# Patient Record
Sex: MALE | Race: WHITE
[De-identification: names, ages, dates, MRNs, and addresses within clinical notes are randomized per-mention and may not be internally consistent; named-entity substitution may affect disease eponyms.]

---

## 2017-05-08 ENCOUNTER — HOSPITAL ENCOUNTER (EMERGENCY)
Dept: HOSPITAL 8 - ED | Age: 32
Discharge: HOME | End: 2017-05-08
Payer: MEDICAID

## 2017-05-08 VITALS — DIASTOLIC BLOOD PRESSURE: 72 MMHG | SYSTOLIC BLOOD PRESSURE: 115 MMHG

## 2017-05-08 VITALS — BODY MASS INDEX: 24.23 KG/M2 | HEIGHT: 71 IN | WEIGHT: 173.06 LBS

## 2017-05-08 DIAGNOSIS — B20: ICD-10-CM
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
## 2017-05-08 DIAGNOSIS — M25.572: ICD-10-CM

## 2017-05-08 DIAGNOSIS — M25.571: Primary | ICD-10-CM
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
## 2017-05-08 PROCEDURE — 99284 EMERGENCY DEPT VISIT MOD MDM: CPT

## 2017-05-10 ENCOUNTER — HOSPITAL ENCOUNTER (EMERGENCY)
Dept: HOSPITAL 8 - ED | Age: 32
Discharge: HOME | End: 2017-05-10
Payer: MEDICAID

## 2017-05-10 VITALS — HEIGHT: 71 IN | WEIGHT: 167.77 LBS | BODY MASS INDEX: 23.49 KG/M2

## 2017-05-10 VITALS — SYSTOLIC BLOOD PRESSURE: 113 MMHG | DIASTOLIC BLOOD PRESSURE: 62 MMHG

## 2017-05-10 DIAGNOSIS — Z88.8: ICD-10-CM

## 2017-05-10 DIAGNOSIS — A08.0: Primary | ICD-10-CM

## 2017-05-10 DIAGNOSIS — Z88.6: ICD-10-CM

## 2017-05-10 LAB
AST SERPL-CCNC: 34 U/L (ref 15–37)
BUN SERPL-MCNC: 10 MG/DL (ref 7–18)

## 2017-05-10 PROCEDURE — 80053 COMPREHEN METABOLIC PANEL: CPT

## 2017-05-10 PROCEDURE — 96361 HYDRATE IV INFUSION ADD-ON: CPT

## 2017-05-10 PROCEDURE — 87040 BLOOD CULTURE FOR BACTERIA: CPT

## 2017-05-10 PROCEDURE — 85025 COMPLETE CBC W/AUTO DIFF WBC: CPT

## 2017-05-10 PROCEDURE — 36415 COLL VENOUS BLD VENIPUNCTURE: CPT

## 2017-05-10 PROCEDURE — 93005 ELECTROCARDIOGRAM TRACING: CPT

## 2017-05-10 PROCEDURE — 96374 THER/PROPH/DIAG INJ IV PUSH: CPT

## 2017-05-10 PROCEDURE — 99285 EMERGENCY DEPT VISIT HI MDM: CPT

## 2017-05-10 PROCEDURE — 71010: CPT

## 2017-05-10 PROCEDURE — 83605 ASSAY OF LACTIC ACID: CPT

## 2017-12-30 ENCOUNTER — HOSPITAL ENCOUNTER (EMERGENCY)
Dept: HOSPITAL 8 - ED | Age: 32
Discharge: HOME | End: 2017-12-30
Payer: MEDICAID

## 2017-12-30 VITALS — DIASTOLIC BLOOD PRESSURE: 72 MMHG | SYSTOLIC BLOOD PRESSURE: 120 MMHG

## 2017-12-30 VITALS — WEIGHT: 170.64 LBS | BODY MASS INDEX: 23.89 KG/M2 | HEIGHT: 71 IN

## 2017-12-30 DIAGNOSIS — J45.909: ICD-10-CM

## 2017-12-30 DIAGNOSIS — J09.X2: Primary | ICD-10-CM

## 2017-12-30 LAB
ALBUMIN SERPL-MCNC: 3.3 G/DL (ref 3.4–5)
ANION GAP SERPL CALC-SCNC: 5 MMOL/L (ref 5–15)
BASOPHILS # BLD AUTO: 0.03 X10^3/UL (ref 0–0.1)
BASOPHILS NFR BLD AUTO: 1 % (ref 0–1)
CALCIUM SERPL-MCNC: 8 MG/DL (ref 8.5–10.1)
CHLORIDE SERPL-SCNC: 102 MMOL/L (ref 98–107)
CREAT SERPL-MCNC: 1.15 MG/DL (ref 0.7–1.3)
EOSINOPHIL # BLD AUTO: 0 X10^3/UL (ref 0–0.4)
EOSINOPHIL NFR BLD AUTO: 0 % (ref 1–7)
ERYTHROCYTE [DISTWIDTH] IN BLOOD BY AUTOMATED COUNT: 13.5 % (ref 9.4–14.8)
FLUBV AG SPEC QL IA: POSITIVE
LYMPHOCYTES # BLD AUTO: 1.54 X10^3/UL (ref 1–3.4)
LYMPHOCYTES NFR BLD AUTO: 26 % (ref 22–44)
MCH RBC QN AUTO: 31.4 PG (ref 27.5–34.5)
MCHC RBC AUTO-ENTMCNC: 34.3 G/DL (ref 33.2–36.2)
MCV RBC AUTO: 91.7 FL (ref 81–97)
MD: NO
MONOCYTES # BLD AUTO: 0.57 X10^3/UL (ref 0.2–0.8)
MONOCYTES NFR BLD AUTO: 10 % (ref 2–9)
NEUTROPHILS # BLD AUTO: 3.77 X10^3/UL (ref 1.8–6.8)
NEUTROPHILS NFR BLD AUTO: 64 % (ref 42–75)
PLATELET # BLD AUTO: 300 X10^3/UL (ref 130–400)
PMV BLD AUTO: 6.7 FL (ref 7.4–10.4)
RBC # BLD AUTO: 4.53 X10^6/UL (ref 4.38–5.82)

## 2017-12-30 PROCEDURE — 85025 COMPLETE CBC W/AUTO DIFF WBC: CPT

## 2017-12-30 PROCEDURE — 36415 COLL VENOUS BLD VENIPUNCTURE: CPT

## 2017-12-30 PROCEDURE — 99285 EMERGENCY DEPT VISIT HI MDM: CPT

## 2017-12-30 PROCEDURE — 80048 BASIC METABOLIC PNL TOTAL CA: CPT

## 2017-12-30 PROCEDURE — 87400 INFLUENZA A/B EACH AG IA: CPT

## 2017-12-30 PROCEDURE — 96374 THER/PROPH/DIAG INJ IV PUSH: CPT

## 2017-12-30 PROCEDURE — 71010: CPT

## 2017-12-30 PROCEDURE — 82040 ASSAY OF SERUM ALBUMIN: CPT

## 2017-12-30 PROCEDURE — 93005 ELECTROCARDIOGRAM TRACING: CPT

## 2018-01-05 ENCOUNTER — HOSPITAL ENCOUNTER (EMERGENCY)
Dept: HOSPITAL 8 - ED | Age: 33
Discharge: HOME | End: 2018-01-05
Payer: MEDICAID

## 2018-01-05 VITALS — DIASTOLIC BLOOD PRESSURE: 91 MMHG | SYSTOLIC BLOOD PRESSURE: 133 MMHG

## 2018-01-05 VITALS — HEIGHT: 71 IN | BODY MASS INDEX: 23.46 KG/M2 | WEIGHT: 167.55 LBS

## 2018-01-05 DIAGNOSIS — J45.909: ICD-10-CM

## 2018-01-05 DIAGNOSIS — F17.200: ICD-10-CM

## 2018-01-05 DIAGNOSIS — B96.89: ICD-10-CM

## 2018-01-05 DIAGNOSIS — J20.8: Primary | ICD-10-CM

## 2018-01-05 PROCEDURE — 99283 EMERGENCY DEPT VISIT LOW MDM: CPT

## 2018-06-02 ENCOUNTER — HOSPITAL ENCOUNTER (EMERGENCY)
Dept: HOSPITAL 8 - ED | Age: 33
Discharge: HOME | End: 2018-06-02
Payer: MEDICAID

## 2018-06-02 VITALS — SYSTOLIC BLOOD PRESSURE: 133 MMHG | DIASTOLIC BLOOD PRESSURE: 91 MMHG

## 2018-06-02 VITALS — BODY MASS INDEX: 23.98 KG/M2 | HEIGHT: 71 IN | WEIGHT: 171.3 LBS

## 2018-06-02 DIAGNOSIS — F17.210: ICD-10-CM

## 2018-06-02 DIAGNOSIS — K02.9: Primary | ICD-10-CM

## 2018-06-02 DIAGNOSIS — F15.10: ICD-10-CM

## 2018-06-02 PROCEDURE — 99283 EMERGENCY DEPT VISIT LOW MDM: CPT

## 2018-07-06 ENCOUNTER — HOSPITAL ENCOUNTER (OUTPATIENT)
Facility: MEDICAL CENTER | Age: 33
End: 2018-07-08
Attending: EMERGENCY MEDICINE | Admitting: HOSPITALIST
Payer: MEDICAID

## 2018-07-06 DIAGNOSIS — K35.890 OTHER ACUTE APPENDICITIS: ICD-10-CM

## 2018-07-06 DIAGNOSIS — K35.30 ACUTE APPENDICITIS WITH LOCALIZED PERITONITIS: ICD-10-CM

## 2018-07-06 PROCEDURE — 99291 CRITICAL CARE FIRST HOUR: CPT

## 2018-07-06 PROCEDURE — 90471 IMMUNIZATION ADMIN: CPT

## 2018-07-06 ASSESSMENT — PATIENT HEALTH QUESTIONNAIRE - PHQ9: CLINICAL INTERPRETATION OF PHQ2 SCORE: 0

## 2018-07-06 ASSESSMENT — PAIN SCALES - WONG BAKER: WONGBAKER_NUMERICALRESPONSE: HURTS A WHOLE LOT

## 2018-07-06 ASSESSMENT — PAIN SCALES - GENERAL: PAINLEVEL_OUTOF10: 8

## 2018-07-07 ENCOUNTER — HOSPITAL ENCOUNTER (OUTPATIENT)
Dept: RADIOLOGY | Facility: MEDICAL CENTER | Age: 33
End: 2018-07-07

## 2018-07-07 VITALS
WEIGHT: 160 LBS | OXYGEN SATURATION: 96 % | DIASTOLIC BLOOD PRESSURE: 58 MMHG | HEART RATE: 80 BPM | RESPIRATION RATE: 16 BRPM | HEIGHT: 71 IN | SYSTOLIC BLOOD PRESSURE: 103 MMHG | TEMPERATURE: 97.5 F | BODY MASS INDEX: 22.4 KG/M2

## 2018-07-07 PROBLEM — A41.9 SEPSIS (HCC): Status: ACTIVE | Noted: 2018-07-07

## 2018-07-07 PROBLEM — E87.6 HYPOKALEMIA: Status: ACTIVE | Noted: 2018-07-07

## 2018-07-07 PROBLEM — Z21 HIV (HUMAN IMMUNODEFICIENCY VIRUS INFECTION) (HCC): Status: ACTIVE | Noted: 2018-07-07

## 2018-07-07 PROBLEM — K35.80 APPENDICITIS, ACUTE: Status: ACTIVE | Noted: 2018-07-07

## 2018-07-07 PROBLEM — B20 HIV (HUMAN IMMUNODEFICIENCY VIRUS INFECTION) (HCC): Status: ACTIVE | Noted: 2018-07-07

## 2018-07-07 LAB
ANION GAP SERPL CALC-SCNC: 8 MMOL/L (ref 0–11.9)
APPEARANCE UR: CLEAR
APTT PPP: 30 SEC (ref 24.7–36)
BASOPHILS # BLD AUTO: 0.3 % (ref 0–1.8)
BASOPHILS # BLD: 0.03 K/UL (ref 0–0.12)
BILIRUB UR QL STRIP.AUTO: NEGATIVE
BUN SERPL-MCNC: 8 MG/DL (ref 8–22)
CALCIUM SERPL-MCNC: 8.7 MG/DL (ref 8.5–10.5)
CHLORIDE SERPL-SCNC: 103 MMOL/L (ref 96–112)
CO2 SERPL-SCNC: 23 MMOL/L (ref 20–33)
COLOR UR: YELLOW
CREAT SERPL-MCNC: 0.84 MG/DL (ref 0.5–1.4)
EOSINOPHIL # BLD AUTO: 0.06 K/UL (ref 0–0.51)
EOSINOPHIL NFR BLD: 0.6 % (ref 0–6.9)
ERYTHROCYTE [DISTWIDTH] IN BLOOD BY AUTOMATED COUNT: 42.5 FL (ref 35.9–50)
EST. AVERAGE GLUCOSE BLD GHB EST-MCNC: 103 MG/DL
GLUCOSE SERPL-MCNC: 101 MG/DL (ref 65–99)
GLUCOSE UR STRIP.AUTO-MCNC: NEGATIVE MG/DL
HBA1C MFR BLD: 5.2 % (ref 0–5.6)
HCT VFR BLD AUTO: 39.8 % (ref 42–52)
HGB BLD-MCNC: 14.3 G/DL (ref 14–18)
IMM GRANULOCYTES # BLD AUTO: 0.06 K/UL (ref 0–0.11)
IMM GRANULOCYTES NFR BLD AUTO: 0.6 % (ref 0–0.9)
INR PPP: 1.03 (ref 0.87–1.13)
KETONES UR STRIP.AUTO-MCNC: NEGATIVE MG/DL
LACTATE BLD-SCNC: 1.1 MMOL/L (ref 0.5–2)
LACTATE BLD-SCNC: 1.5 MMOL/L (ref 0.5–2)
LACTATE BLD-SCNC: 1.6 MMOL/L (ref 0.5–2)
LACTATE BLD-SCNC: 2.7 MMOL/L (ref 0.5–2)
LEUKOCYTE ESTERASE UR QL STRIP.AUTO: NEGATIVE
LYMPHOCYTES # BLD AUTO: 1.66 K/UL (ref 1–4.8)
LYMPHOCYTES NFR BLD: 15.8 % (ref 22–41)
MCH RBC QN AUTO: 31.9 PG (ref 27–33)
MCHC RBC AUTO-ENTMCNC: 35.9 G/DL (ref 33.7–35.3)
MCV RBC AUTO: 88.8 FL (ref 81.4–97.8)
MICRO URNS: NORMAL
MONOCYTES # BLD AUTO: 0.71 K/UL (ref 0–0.85)
MONOCYTES NFR BLD AUTO: 6.7 % (ref 0–13.4)
NEUTROPHILS # BLD AUTO: 8.01 K/UL (ref 1.82–7.42)
NEUTROPHILS NFR BLD: 76 % (ref 44–72)
NITRITE UR QL STRIP.AUTO: NEGATIVE
NRBC # BLD AUTO: 0 K/UL
NRBC BLD-RTO: 0 /100 WBC
PH UR STRIP.AUTO: 7.5 [PH]
PLATELET # BLD AUTO: 256 K/UL (ref 164–446)
PMV BLD AUTO: 8.4 FL (ref 9–12.9)
POTASSIUM SERPL-SCNC: 3.8 MMOL/L (ref 3.6–5.5)
PROT UR QL STRIP: NEGATIVE MG/DL
PROTHROMBIN TIME: 13.2 SEC (ref 12–14.6)
RBC # BLD AUTO: 4.48 M/UL (ref 4.7–6.1)
RBC UR QL AUTO: NEGATIVE
SODIUM SERPL-SCNC: 134 MMOL/L (ref 135–145)
SP GR UR STRIP.AUTO: 1.04
TSH SERPL DL<=0.005 MIU/L-ACNC: 0.52 UIU/ML (ref 0.38–5.33)
UROBILINOGEN UR STRIP.AUTO-MCNC: 0.2 MG/DL
WBC # BLD AUTO: 10.5 K/UL (ref 4.8–10.8)

## 2018-07-07 PROCEDURE — 700111 HCHG RX REV CODE 636 W/ 250 OVERRIDE (IP): Performed by: HOSPITALIST

## 2018-07-07 PROCEDURE — 501838 HCHG SUTURE GENERAL: Performed by: SURGERY

## 2018-07-07 PROCEDURE — G0378 HOSPITAL OBSERVATION PER HR: HCPCS

## 2018-07-07 PROCEDURE — 700111 HCHG RX REV CODE 636 W/ 250 OVERRIDE (IP): Performed by: EMERGENCY MEDICINE

## 2018-07-07 PROCEDURE — 83036 HEMOGLOBIN GLYCOSYLATED A1C: CPT

## 2018-07-07 PROCEDURE — 160028 HCHG SURGERY MINUTES - 1ST 30 MINS LEVEL 3: Performed by: SURGERY

## 2018-07-07 PROCEDURE — 160048 HCHG OR STATISTICAL LEVEL 1-5: Performed by: SURGERY

## 2018-07-07 PROCEDURE — 94760 N-INVAS EAR/PLS OXIMETRY 1: CPT

## 2018-07-07 PROCEDURE — 83605 ASSAY OF LACTIC ACID: CPT

## 2018-07-07 PROCEDURE — 501399 HCHG SPECIMAN BAG, ENDO CATC: Performed by: SURGERY

## 2018-07-07 PROCEDURE — 700105 HCHG RX REV CODE 258: Performed by: EMERGENCY MEDICINE

## 2018-07-07 PROCEDURE — 160009 HCHG ANES TIME/MIN: Performed by: SURGERY

## 2018-07-07 PROCEDURE — 700105 HCHG RX REV CODE 258: Performed by: HOSPITALIST

## 2018-07-07 PROCEDURE — 501583 HCHG TROCAR, THRD CAN&SEAL 5X100: Performed by: SURGERY

## 2018-07-07 PROCEDURE — 84443 ASSAY THYROID STIM HORMONE: CPT

## 2018-07-07 PROCEDURE — 501582 HCHG TROCAR, THRD BLADED: Performed by: SURGERY

## 2018-07-07 PROCEDURE — 500002 HCHG ADHESIVE, DERMABOND: Performed by: SURGERY

## 2018-07-07 PROCEDURE — 160035 HCHG PACU - 1ST 60 MINS PHASE I: Performed by: SURGERY

## 2018-07-07 PROCEDURE — 96374 THER/PROPH/DIAG INJ IV PUSH: CPT

## 2018-07-07 PROCEDURE — A9270 NON-COVERED ITEM OR SERVICE: HCPCS | Performed by: HOSPITALIST

## 2018-07-07 PROCEDURE — 88304 TISSUE EXAM BY PATHOLOGIST: CPT

## 2018-07-07 PROCEDURE — 99220 PR INITIAL OBSERVATION CARE,LEVL III: CPT | Performed by: HOSPITALIST

## 2018-07-07 PROCEDURE — 502571 HCHG PACK, LAP CHOLE: Performed by: SURGERY

## 2018-07-07 PROCEDURE — 160039 HCHG SURGERY MINUTES - EA ADDL 1 MIN LEVEL 3: Performed by: SURGERY

## 2018-07-07 PROCEDURE — 700101 HCHG RX REV CODE 250

## 2018-07-07 PROCEDURE — 700105 HCHG RX REV CODE 258: Performed by: INTERNAL MEDICINE

## 2018-07-07 PROCEDURE — 501450 HCHG STAPLES, ENDO MULTIFIRE: Performed by: SURGERY

## 2018-07-07 PROCEDURE — 85025 COMPLETE CBC W/AUTO DIFF WBC: CPT

## 2018-07-07 PROCEDURE — 85730 THROMBOPLASTIN TIME PARTIAL: CPT

## 2018-07-07 PROCEDURE — 36415 COLL VENOUS BLD VENIPUNCTURE: CPT

## 2018-07-07 PROCEDURE — 87040 BLOOD CULTURE FOR BACTERIA: CPT | Mod: 91

## 2018-07-07 PROCEDURE — 85610 PROTHROMBIN TIME: CPT

## 2018-07-07 PROCEDURE — 96375 TX/PRO/DX INJ NEW DRUG ADDON: CPT

## 2018-07-07 PROCEDURE — 700111 HCHG RX REV CODE 636 W/ 250 OVERRIDE (IP)

## 2018-07-07 PROCEDURE — 90670 PCV13 VACCINE IM: CPT | Performed by: HOSPITALIST

## 2018-07-07 PROCEDURE — 160036 HCHG PACU - EA ADDL 30 MINS PHASE I: Performed by: SURGERY

## 2018-07-07 PROCEDURE — 501570 HCHG TROCAR, SEPARATOR: Performed by: SURGERY

## 2018-07-07 PROCEDURE — 160002 HCHG RECOVERY MINUTES (STAT): Performed by: SURGERY

## 2018-07-07 PROCEDURE — 81003 URINALYSIS AUTO W/O SCOPE: CPT

## 2018-07-07 PROCEDURE — 80048 BASIC METABOLIC PNL TOTAL CA: CPT

## 2018-07-07 PROCEDURE — 700102 HCHG RX REV CODE 250 W/ 637 OVERRIDE(OP): Performed by: HOSPITALIST

## 2018-07-07 RX ORDER — ABACAVIR 300 MG/1
600 TABLET ORAL DAILY
Status: DISCONTINUED | OUTPATIENT
Start: 2018-07-07 | End: 2018-07-08 | Stop reason: HOSPADM

## 2018-07-07 RX ORDER — ONDANSETRON 2 MG/ML
4 INJECTION INTRAMUSCULAR; INTRAVENOUS EVERY 4 HOURS PRN
Status: DISCONTINUED | OUTPATIENT
Start: 2018-07-07 | End: 2018-07-08 | Stop reason: HOSPADM

## 2018-07-07 RX ORDER — ONDANSETRON 2 MG/ML
4 INJECTION INTRAMUSCULAR; INTRAVENOUS ONCE
Status: COMPLETED | OUTPATIENT
Start: 2018-07-07 | End: 2018-07-07

## 2018-07-07 RX ORDER — BISACODYL 10 MG
10 SUPPOSITORY, RECTAL RECTAL
Status: DISCONTINUED | OUTPATIENT
Start: 2018-07-07 | End: 2018-07-08 | Stop reason: HOSPADM

## 2018-07-07 RX ORDER — MAGNESIUM HYDROXIDE 1200 MG/15ML
LIQUID ORAL
Status: COMPLETED | OUTPATIENT
Start: 2018-07-07 | End: 2018-07-07

## 2018-07-07 RX ORDER — BUPIVACAINE HYDROCHLORIDE AND EPINEPHRINE 5; 5 MG/ML; UG/ML
INJECTION, SOLUTION EPIDURAL; INTRACAUDAL; PERINEURAL
Status: DISCONTINUED | OUTPATIENT
Start: 2018-07-07 | End: 2018-07-07 | Stop reason: HOSPADM

## 2018-07-07 RX ORDER — AMOXICILLIN 250 MG
2 CAPSULE ORAL 2 TIMES DAILY
Status: DISCONTINUED | OUTPATIENT
Start: 2018-07-07 | End: 2018-07-08 | Stop reason: HOSPADM

## 2018-07-07 RX ORDER — ONDANSETRON 4 MG/1
4 TABLET, ORALLY DISINTEGRATING ORAL EVERY 4 HOURS PRN
Status: DISCONTINUED | OUTPATIENT
Start: 2018-07-07 | End: 2018-07-08 | Stop reason: HOSPADM

## 2018-07-07 RX ORDER — OXYCODONE HYDROCHLORIDE 5 MG/1
5 TABLET ORAL
Status: DISCONTINUED | OUTPATIENT
Start: 2018-07-07 | End: 2018-07-08 | Stop reason: HOSPADM

## 2018-07-07 RX ORDER — SODIUM CHLORIDE 9 MG/ML
30 INJECTION, SOLUTION INTRAVENOUS
Status: DISCONTINUED | OUTPATIENT
Start: 2018-07-07 | End: 2018-07-08 | Stop reason: HOSPADM

## 2018-07-07 RX ORDER — SODIUM CHLORIDE, SODIUM LACTATE, POTASSIUM CHLORIDE, CALCIUM CHLORIDE 600; 310; 30; 20 MG/100ML; MG/100ML; MG/100ML; MG/100ML
INJECTION, SOLUTION INTRAVENOUS
Status: COMPLETED | OUTPATIENT
Start: 2018-07-07 | End: 2018-07-07

## 2018-07-07 RX ORDER — PROMETHAZINE HYDROCHLORIDE 25 MG/1
12.5-25 SUPPOSITORY RECTAL EVERY 4 HOURS PRN
Status: DISCONTINUED | OUTPATIENT
Start: 2018-07-07 | End: 2018-07-08 | Stop reason: HOSPADM

## 2018-07-07 RX ORDER — SODIUM CHLORIDE 9 MG/ML
INJECTION, SOLUTION INTRAVENOUS CONTINUOUS
Status: DISCONTINUED | OUTPATIENT
Start: 2018-07-07 | End: 2018-07-08 | Stop reason: HOSPADM

## 2018-07-07 RX ORDER — SODIUM CHLORIDE 9 MG/ML
INJECTION, SOLUTION INTRAVENOUS CONTINUOUS
Status: DISCONTINUED | OUTPATIENT
Start: 2018-07-07 | End: 2018-07-07

## 2018-07-07 RX ORDER — SODIUM CHLORIDE 9 MG/ML
500 INJECTION, SOLUTION INTRAVENOUS
Status: DISCONTINUED | OUTPATIENT
Start: 2018-07-07 | End: 2018-07-08 | Stop reason: HOSPADM

## 2018-07-07 RX ORDER — PROMETHAZINE HYDROCHLORIDE 25 MG/1
12.5-25 TABLET ORAL EVERY 4 HOURS PRN
Status: DISCONTINUED | OUTPATIENT
Start: 2018-07-07 | End: 2018-07-08 | Stop reason: HOSPADM

## 2018-07-07 RX ORDER — SODIUM CHLORIDE 9 MG/ML
1000 INJECTION, SOLUTION INTRAVENOUS ONCE
Status: COMPLETED | OUTPATIENT
Start: 2018-07-07 | End: 2018-07-07

## 2018-07-07 RX ORDER — OXYCODONE HYDROCHLORIDE 10 MG/1
10 TABLET ORAL
Status: DISCONTINUED | OUTPATIENT
Start: 2018-07-07 | End: 2018-07-08 | Stop reason: HOSPADM

## 2018-07-07 RX ORDER — LAMIVUDINE 150 MG/1
300 TABLET, FILM COATED ORAL DAILY
Status: DISCONTINUED | OUTPATIENT
Start: 2018-07-07 | End: 2018-07-08 | Stop reason: HOSPADM

## 2018-07-07 RX ORDER — POLYETHYLENE GLYCOL 3350 17 G/17G
1 POWDER, FOR SOLUTION ORAL
Status: DISCONTINUED | OUTPATIENT
Start: 2018-07-07 | End: 2018-07-08 | Stop reason: HOSPADM

## 2018-07-07 RX ADMIN — SODIUM CHLORIDE 1000 ML: 9 INJECTION, SOLUTION INTRAVENOUS at 12:42

## 2018-07-07 RX ADMIN — LAMIVUDINE 300 MG: 150 TABLET, FILM COATED ORAL at 08:36

## 2018-07-07 RX ADMIN — FENTANYL CITRATE 100 MCG: 50 INJECTION, SOLUTION INTRAMUSCULAR; INTRAVENOUS at 00:22

## 2018-07-07 RX ADMIN — OXYCODONE HYDROCHLORIDE 5 MG: 5 TABLET ORAL at 11:07

## 2018-07-07 RX ADMIN — SODIUM CHLORIDE: 9 INJECTION, SOLUTION INTRAVENOUS at 00:22

## 2018-07-07 RX ADMIN — STANDARDIZED SENNA CONCENTRATE AND DOCUSATE SODIUM 2 TABLET: 8.6; 5 TABLET, FILM COATED ORAL at 08:39

## 2018-07-07 RX ADMIN — OXYCODONE HYDROCHLORIDE 5 MG: 5 TABLET ORAL at 20:00

## 2018-07-07 RX ADMIN — PIPERACILLIN AND TAZOBACTAM 4.5 G: 4; .5 INJECTION, POWDER, LYOPHILIZED, FOR SOLUTION INTRAVENOUS; PARENTERAL at 03:55

## 2018-07-07 RX ADMIN — ABACAVIR 600 MG: 300 TABLET, FILM COATED ORAL at 08:36

## 2018-07-07 RX ADMIN — SODIUM CHLORIDE: 9 INJECTION, SOLUTION INTRAVENOUS at 04:40

## 2018-07-07 RX ADMIN — DOLUTEGRAVIR SODIUM 50 MG: 50 TABLET, FILM COATED ORAL at 08:36

## 2018-07-07 RX ADMIN — ONDANSETRON 4 MG: 2 INJECTION, SOLUTION INTRAMUSCULAR; INTRAVENOUS at 00:22

## 2018-07-07 RX ADMIN — PNEUMOCOCCAL 13-VALENT CONJUGATE VACCINE 0.5 ML: 2.2; 2.2; 2.2; 2.2; 2.2; 4.4; 2.2; 2.2; 2.2; 2.2; 2.2; 2.2; 2.2 INJECTION, SUSPENSION INTRAMUSCULAR at 08:44

## 2018-07-07 ASSESSMENT — ENCOUNTER SYMPTOMS
HEMOPTYSIS: 0
BLURRED VISION: 0
SENSORY CHANGE: 0
COUGH: 0
ABDOMINAL PAIN: 1
DOUBLE VISION: 0
PALPITATIONS: 0
WEAKNESS: 0
NAUSEA: 1
CHILLS: 0
HEARTBURN: 0
FLANK PAIN: 0
FEVER: 0
VOMITING: 0
MYALGIAS: 0
EYE DISCHARGE: 0
BRUISES/BLEEDS EASILY: 0
SPEECH CHANGE: 0
SHORTNESS OF BREATH: 0
DIZZINESS: 0
FOCAL WEAKNESS: 0
HALLUCINATIONS: 0
DEPRESSION: 0

## 2018-07-07 ASSESSMENT — PATIENT HEALTH QUESTIONNAIRE - PHQ9
9. THOUGHTS THAT YOU WOULD BE BETTER OFF DEAD, OR OF HURTING YOURSELF: NOT AT ALL
5. POOR APPETITE OR OVEREATING: MORE THAN HALF THE DAYS
SUM OF ALL RESPONSES TO PHQ9 QUESTIONS 1 AND 2: 4
4. FEELING TIRED OR HAVING LITTLE ENERGY: MORE THAN HALF THE DAYS
2. FEELING DOWN, DEPRESSED, IRRITABLE, OR HOPELESS: MORE THAN HALF THE DAYS
3. TROUBLE FALLING OR STAYING ASLEEP OR SLEEPING TOO MUCH: SEVERAL DAYS
8. MOVING OR SPEAKING SO SLOWLY THAT OTHER PEOPLE COULD HAVE NOTICED. OR THE OPPOSITE, BEING SO FIGETY OR RESTLESS THAT YOU HAVE BEEN MOVING AROUND A LOT MORE THAN USUAL: NOT AT ALL
6. FEELING BAD ABOUT YOURSELF - OR THAT YOU ARE A FAILURE OR HAVE LET YOURSELF OR YOUR FAMILY DOWN: MORE THAN HALF THE DAYS
SUM OF ALL RESPONSES TO PHQ QUESTIONS 1-9: 12
1. LITTLE INTEREST OR PLEASURE IN DOING THINGS: MORE THAN HALF THE DAYS
7. TROUBLE CONCENTRATING ON THINGS, SUCH AS READING THE NEWSPAPER OR WATCHING TELEVISION: SEVERAL DAYS

## 2018-07-07 ASSESSMENT — PAIN SCALES - GENERAL
PAINLEVEL_OUTOF10: 0
PAINLEVEL_OUTOF10: 3
PAINLEVEL_OUTOF10: 5
PAINLEVEL_OUTOF10: 0
PAINLEVEL_OUTOF10: 4
PAINLEVEL_OUTOF10: 4
PAINLEVEL_OUTOF10: 3
PAINLEVEL_OUTOF10: 4
PAINLEVEL_OUTOF10: 3
PAINLEVEL_OUTOF10: 4
PAINLEVEL_OUTOF10: 0

## 2018-07-07 ASSESSMENT — COGNITIVE AND FUNCTIONAL STATUS - GENERAL
DAILY ACTIVITIY SCORE: 24
SUGGESTED CMS G CODE MODIFIER DAILY ACTIVITY: CH
MOBILITY SCORE: 24
SUGGESTED CMS G CODE MODIFIER MOBILITY: CH

## 2018-07-07 ASSESSMENT — LIFESTYLE VARIABLES
SUBSTANCE_ABUSE: 0
EVER_SMOKED: YES
ALCOHOL_USE: NO

## 2018-07-07 NOTE — OR NURSING
Pt dozing but awakens and appropriate.  He still denies any pain and no objective signs such as facial grimacing or tense body habitus.  Pt prepped for floor.

## 2018-07-07 NOTE — ED TRIAGE NOTES
Patient transferred form [facility] for appendicitis. Patient reports RUQ that started yesterday and has become worse today. Hx of HIV

## 2018-07-07 NOTE — OR NURSING
Pt remains sleepy but able to answer questions.  Pt does say he is at a hospital but says it is [facility]'s.  I corrected him to which hospital he is in.

## 2018-07-07 NOTE — ASSESSMENT & PLAN NOTE
This is sepsis (without associated acute organ dysfunction).   intrabd infection with appendicitis  IV abx   Follow cultures  Check lactic trend   IVF   descilate as clinically appropriate

## 2018-07-07 NOTE — ASSESSMENT & PLAN NOTE
Acute appendicitis  NPO  Surgery has been consulted  IV abx for prophylaxis  Pain control, anti emetics

## 2018-07-07 NOTE — H&P
Hospital Medicine History and Physical    Date of Service  7/7/2018    Chief Complaint  Chief Complaint   Patient presents with   • Abdominal Pain       History of Presenting Illness  33 y.o. male who presented 7/6/2018 with gradually worsening right lower quadrant abdominal pain.  Patient's pain initially was a crampy pain mid abdomen now progressively worsening the right lower quadrant.  Severe pain now 10 out of 10 especially with palpation.  He has also fevers nausea no vomiting no diarrhea no constipation.  He has a history of HIV otherwise is generally healthy.  He has no alleviating or exacerbating factors to his pain except for palpation.  Found to have appendicitis and transferred to [facility] for surgical intervention.    Primary Care Physician  Pcp Pt States None    Consultants  Surgery     Code Status  full    Review of Systems  Review of Systems   Constitutional: Negative for chills and fever.   HENT: Negative for congestion, hearing loss and tinnitus.    Eyes: Negative for blurred vision, double vision and discharge.   Respiratory: Negative for cough, hemoptysis and shortness of breath.    Cardiovascular: Negative for chest pain, palpitations and leg swelling.   Gastrointestinal: Positive for abdominal pain and nausea. Negative for heartburn and vomiting.   Genitourinary: Negative for dysuria and flank pain.   Musculoskeletal: Negative for joint pain and myalgias.   Skin: Negative for rash.   Neurological: Negative for dizziness, sensory change, speech change, focal weakness and weakness.   Endo/Heme/Allergies: Negative for environmental allergies. Does not bruise/bleed easily.   Psychiatric/Behavioral: Negative for depression, hallucinations and substance abuse.        Past Medical History  Past Medical History:   Diagnosis Date   • Asthma    • HIV (human immunodeficiency virus infection) (HCC)        Surgical History  Past Surgical History:   Procedure Laterality Date   • CLUB FOOT RELEASE Bilateral   "      Medications  No current facility-administered medications on file prior to encounter.      No current outpatient prescriptions on file prior to encounter.       Family History  History reviewed. No pertinent family history.    Social History  Social History   Substance Use Topics   • Smoking status: Current Every Day Smoker     Packs/day: 0.50     Years: 4.00     Types: Cigarettes   • Smokeless tobacco: Current User   • Alcohol use No       Allergies  Allergies   Allergen Reactions   • Morphine      Pt turned \"red\" and was put into an \"ice bath\" after receiving morphine at age 3    • Benadryl Allergy      Pt gets \"amped up, jittery and cannot sleep\"        Physical Exam  Laboratory   Hemodynamics  Temp (24hrs), Av.8 °C (100 °F), Min:37.8 °C (100 °F), Max:37.8 °C (100 °F)   Temperature: 37.8 °C (100 °F)  Pulse  Av  Min: 102  Max: 102    Blood Pressure: 129/82      Respiratory      Respiration: 18, Pulse Oximetry: 95 %             Physical Exam   Constitutional: He is oriented to person, place, and time. He appears well-developed and well-nourished.   HENT:   Head: Normocephalic and atraumatic.   Eyes: Conjunctivae and EOM are normal. Pupils are equal, round, and reactive to light.   Neck: Normal range of motion. Neck supple. No JVD present.   Cardiovascular: Normal rate, regular rhythm, normal heart sounds and intact distal pulses.    No murmur heard.  Pulmonary/Chest: Effort normal and breath sounds normal. No respiratory distress. He exhibits no tenderness.   Abdominal: Soft. Bowel sounds are normal. He exhibits no distension. There is tenderness.   Severe ttp RLQ    Musculoskeletal: Normal range of motion. He exhibits no edema.   Neurological: He is alert and oriented to person, place, and time. No cranial nerve deficit. He exhibits normal muscle tone.   Skin: Skin is warm and dry. No erythema.   Psychiatric: He has a normal mood and affect. His behavior is normal. Judgment and thought content " normal.   Nursing note and vitals reviewed.        CBC 10.9 WBC count hemoglobin 15.3 platelet 268  Sodium 132 potassium 3.1 chloride 94 CO2 28 calcium 8.3 BUN 10 creatinine 1.04 glucose 105 albumin 3.8      No results for input(s): ALTSGPT, ASTSGOT, ALKPHOSPHAT, TBILIRUBIN, DBILIRUBIN, GAMMAGT, AMYLASE, LIPASE, ALB, PREALBUMIN, GLUCOSE in the last 72 hours.              No results found for: TROPONINI  Urinalysis:  No results found for: SPECGRAVITY, GLUCOSEUR, KETONES, NITRITE, WBCURINE, RBCURINE, BACTERIA, EPITHELCELL     Imaging  Severe acute appendicitis no definite evidence of rupture on CT   Assessment/Plan     I anticipate this patient is appropriate for observation status at this time.    * Appendicitis, acute   Assessment & Plan    Acute appendicitis  NPO  Surgery has been consulted  IV abx for prophylaxis  Pain control, anti emetics        Hypokalemia   Assessment & Plan    Replace and recheck        HIV (human immunodeficiency virus infection) ([facility])   Assessment & Plan    Started on anti HIV medications   Triumeq        Sepsis ([facility])   Assessment & Plan    This is sepsis (without associated acute organ dysfunction).   intrabd infection with appendicitis  IV abx   Follow cultures  Check lactic trend   IVF   descilate as clinically appropriate            VTE prophylaxis: SCD

## 2018-07-07 NOTE — PROGRESS NOTES
POD#0  Feels better  Wounds ok  Ok to d/c when cleared by primary team  Follow up with pcp or me in 1 week.

## 2018-07-07 NOTE — PROGRESS NOTES
Pt arrived at 430. Pt mad comfortable in room. Pt taught about floor procedure. Pt orientated to where things were.All questions answered.

## 2018-07-07 NOTE — OR SURGEON
Post OP Note    PreOp Diagnosis: Acute appendicitis     PostOp Diagnosis: Acute appendicitis without rupture    Procedure(s):  APPENDECTOMY LAPAROSCOPIC - Wound Class: Clean Contaminated    Surgeon(s):  Avinash Hills M.D.    Anesthesiologist/Type of Anesthesia:  Anesthesiologist: Guillermina Cleveland M.D./General    Surgical Staff:  Circulator: Kathie Mena R.N.  Relief Circulator: Fani Bhatti R.N.  Scrub Person: Latriceros Sanchez [place]: Orly Landeros    Specimens removed if any:  Appendix    Estimated Blood Loss: 15 cc    Findings: Acute appendicitis without rupture    Complications: No complications were noted    Indication: Patient is a 33-year-old HIV-positive male who presented as a transfer from [facility] with acute appendicitis.  The patient was counseled extensively as to the risks versus benefits of surgery and agreed to proceed fully informed.    Description of the procedure:  The patient was prepped and draped in the standard sterile surgical fashion after induction of general anesthesia.  Appropriate timeout had been performed and antibiotics delivered.  A periumbilical verres insertion was performed and the abdomen was insufflated to 15 mmHg CO2.  A 5 mm Visiport was applied.  A suprapubic 5 mm trocar and a subxiphoid 12 mm trocar placed under direct visualization.    The appendix was located in its right lower quadrant position.  It was dilated and inflamed but not perforated.  A Harmonic scalpel was used to carefully take the mesoappendix down to the base of the appendix.  A 35 mm vascular load stapler was used to transect the appendix at its base.  An Endo Catch was used to retrieve the appendix via the subxiphoid incision.    Right lower quadrant was irrigated until clear.  The staple line was intact.  Hemostasis was meticulously achieved.  An Endo Close device was used to close the fascia of the subxiphoid trocar.  Monocryl was used for skin edges.  Dermabond was applied as  a dressing.  The patient was extubated to recovery in satisfactory condition.    Disposition:    The patient will be admitted to the medicine service.  He is doing well he can be discharged later this afternoon.    7/7/2018 2:49 AM Avinash Hills M.D.

## 2018-07-07 NOTE — H&P
"General Surgery Consult    CHIEF COMPLAINT: Abdominal pain    HISTORY OF PRESENT ILLNESS: The patient is a 33 y.o. male, who presents with abdominal pain.  It began this morning at 8 AM.  He presented to the emergency department in [facility] where he was diagnosed with acute appendicitis.  He was transferred to [facility] for unknown reasons.  The patient describes right lower quadrant abdominal pain that is worse with movement and without relieving factors.  His past medical history is complicated by being HIV positive.  He reports his last viral count is undetectable.    PAST MEDICAL HISTORY:  has a past medical history of Asthma and HIV (human immunodeficiency virus infection) ([facility]).     PAST SURGICAL HISTORY:  has a past surgical history that includes club foot release (Bilateral).     ALLERGIES:   Allergies   Allergen Reactions   • Morphine      Pt turned \"red\" and was put into an \"ice bath\" after receiving morphine at age 3    • Benadryl Allergy      Pt gets \"amped up, jittery and cannot sleep\"        CURRENT MEDICATIONS:   Home Medications     Reviewed by Orlin Myers R.N. (Registered Nurse) on 07/06/18 at 2345  Med List Status: Not Addressed   Medication Last Dose Status   Abacavir-Dolutegravir-Lamivud (TRIUMEQ PO)  Active                FAMILY HISTORY: History reviewed. No pertinent family history.     SOCIAL HISTORY:   Social History     Social History Main Topics   • Smoking status: Current Every Day Smoker     Packs/day: 0.50     Years: 4.00     Types: Cigarettes   • Smokeless tobacco: Current User   • Alcohol use No   • Drug use: Yes     Types: Inhaled      Comment: marijuana, methamphetamine (last use 1 week ago)    • Sexual activity: Not on file       REVIEW OF SYSTEMS: Comprehensive review of systems was negative aside from the above HPI.     PHYSICAL EXAMINATION:     GENERAL: The patient is in mild distress.   VITAL SIGNS: Blood pressure 130/80, pulse 99, temperature 36.4 °C " "(97.5 °F), resp. rate 18, height 1.803 m (5' 11\"), weight 72.6 kg (160 lb), SpO2 97 %.  HEAD AND NECK: Demonstrates symmetric, reactive pupils. Extraocular muscles   are intact. Nares and oropharynx are clear.   NECK: Supple. No adenopathy.  CHEST:No respiratory distress.    CARDIOVASCULAR: Regular rate. The extremities are well perfused.   ABDOMEN: Tender to palpation in the right lower quadrant with guarding.   EXTREMITIES: Examination of the upper and lower extremities demonstrates no cyanosis edema or clubbing.  NEUROLOGIC: Alert & oriented x 3, Normal motor function, Normal sensory function, No focal deficits noted.    LABORATORY VALUES:   Recent Labs      07/07/18   0018   WBC  10.5   RBC  4.48*   HEMOGLOBIN  14.3   HEMATOCRIT  39.8*   MCV  88.8   MCH  31.9   MCHC  35.9*   RDW  42.5   PLATELETCT  256   MPV  8.4*     Recent Labs      07/07/18   0018   SODIUM  134*   POTASSIUM  3.8   CHLORIDE  103   CO2  23   GLUCOSE  101*   BUN  8   CREATININE  0.84   CALCIUM  8.7     Recent Labs      07/07/18   0040   INR  1.03     Recent Labs      07/07/18   0040   APTT  30.0   INR  1.03        IMAGING:   OUTSIDE IMAGES-CT ABDOMEN /PELVIS   Final Result          IMPRESSION AND PLAN:     1.  Acute appendicitis.    1.  The patient will be taken to the operating room for a laparoscopic appendectomy. The surgical conduct was explained. Potential complications including but not limited to infection, bleeding, damage to adjacent structures, anesthetic complications were discussed in detail. Questions were elicited and answered to his satisfaction. He understands the rationale for surgery and elects to proceed.  Operative consent signed.      2.  The patient will be admitted to the hospital service secondary to his HIV positive status    ___________________________________   Avinash Hills M.D.    DD: 7/7/2018 DT: 1:54 AM    "

## 2018-07-07 NOTE — CARE PLAN
Problem: Safety  Goal: Will remain free from falls  Outcome: PROGRESSING AS EXPECTED  No fall has occurred. Pt knows about needing to call prior to getting up.     Problem: Infection  Goal: Will remain free from infection  Outcome: PROGRESSING AS EXPECTED  No s/s of infection. Before and after entering the room hands are washed. Prior to IV access the hub is scrubbed for 15 secs and allowed to dry.

## 2018-07-07 NOTE — PROGRESS NOTES
"Pt up to EOB having meal, calm, cooperative, pleasant affect. Dr Patterson at bedside reviewed POC which is for possible discharge today, when / if pian well managed. RN encouraged CDB  will reinforce t/o day. Fall and safety precautions in place, pt uses call light appropriately. RN reviewed \"ankle pumps\" to promote circulation when in bed, pt is ambulatory. Pt is independent w/ turns for skin integrity. Hourly rounds ongoing, call light w/in reach.   "

## 2018-07-08 PROBLEM — E87.6 HYPOKALEMIA: Status: RESOLVED | Noted: 2018-07-07 | Resolved: 2018-07-08

## 2018-07-08 PROBLEM — A41.9 SEPSIS (HCC): Status: RESOLVED | Noted: 2018-07-07 | Resolved: 2018-07-08

## 2018-07-08 LAB
ALBUMIN SERPL BCP-MCNC: 3.6 G/DL (ref 3.2–4.9)
ALBUMIN/GLOB SERPL: 1.2 G/DL
ALP SERPL-CCNC: 74 U/L (ref 30–99)
ALT SERPL-CCNC: 22 U/L (ref 2–50)
ANION GAP SERPL CALC-SCNC: 6 MMOL/L (ref 0–11.9)
AST SERPL-CCNC: 22 U/L (ref 12–45)
BASOPHILS # BLD AUTO: 0.3 % (ref 0–1.8)
BASOPHILS # BLD: 0.03 K/UL (ref 0–0.12)
BILIRUB SERPL-MCNC: 0.4 MG/DL (ref 0.1–1.5)
BUN SERPL-MCNC: 10 MG/DL (ref 8–22)
CALCIUM SERPL-MCNC: 9 MG/DL (ref 8.5–10.5)
CHLORIDE SERPL-SCNC: 106 MMOL/L (ref 96–112)
CHOLEST SERPL-MCNC: 117 MG/DL (ref 100–199)
CO2 SERPL-SCNC: 25 MMOL/L (ref 20–33)
CREAT SERPL-MCNC: 0.83 MG/DL (ref 0.5–1.4)
EOSINOPHIL # BLD AUTO: 0.05 K/UL (ref 0–0.51)
EOSINOPHIL NFR BLD: 0.4 % (ref 0–6.9)
ERYTHROCYTE [DISTWIDTH] IN BLOOD BY AUTOMATED COUNT: 43.5 FL (ref 35.9–50)
GLOBULIN SER CALC-MCNC: 2.9 G/DL (ref 1.9–3.5)
GLUCOSE SERPL-MCNC: 118 MG/DL (ref 65–99)
HCT VFR BLD AUTO: 39.1 % (ref 42–52)
HDLC SERPL-MCNC: 53 MG/DL
HGB BLD-MCNC: 14 G/DL (ref 14–18)
IMM GRANULOCYTES # BLD AUTO: 0.06 K/UL (ref 0–0.11)
IMM GRANULOCYTES NFR BLD AUTO: 0.5 % (ref 0–0.9)
LDLC SERPL CALC-MCNC: 49 MG/DL
LYMPHOCYTES # BLD AUTO: 2.17 K/UL (ref 1–4.8)
LYMPHOCYTES NFR BLD: 18.1 % (ref 22–41)
MCH RBC QN AUTO: 32.2 PG (ref 27–33)
MCHC RBC AUTO-ENTMCNC: 35.8 G/DL (ref 33.7–35.3)
MCV RBC AUTO: 89.9 FL (ref 81.4–97.8)
MONOCYTES # BLD AUTO: 0.81 K/UL (ref 0–0.85)
MONOCYTES NFR BLD AUTO: 6.8 % (ref 0–13.4)
NEUTROPHILS # BLD AUTO: 8.87 K/UL (ref 1.82–7.42)
NEUTROPHILS NFR BLD: 73.9 % (ref 44–72)
NRBC # BLD AUTO: 0 K/UL
NRBC BLD-RTO: 0 /100 WBC
PLATELET # BLD AUTO: 292 K/UL (ref 164–446)
PMV BLD AUTO: 8.7 FL (ref 9–12.9)
POTASSIUM SERPL-SCNC: 4 MMOL/L (ref 3.6–5.5)
PROT SERPL-MCNC: 6.5 G/DL (ref 6–8.2)
RBC # BLD AUTO: 4.35 M/UL (ref 4.7–6.1)
SODIUM SERPL-SCNC: 137 MMOL/L (ref 135–145)
TRIGL SERPL-MCNC: 76 MG/DL (ref 0–149)
WBC # BLD AUTO: 12 K/UL (ref 4.8–10.8)

## 2018-07-08 PROCEDURE — 36415 COLL VENOUS BLD VENIPUNCTURE: CPT

## 2018-07-08 PROCEDURE — 99217 PR OBSERVATION CARE DISCHARGE: CPT | Performed by: INTERNAL MEDICINE

## 2018-07-08 PROCEDURE — G0378 HOSPITAL OBSERVATION PER HR: HCPCS

## 2018-07-08 PROCEDURE — 80061 LIPID PANEL: CPT

## 2018-07-08 PROCEDURE — 85025 COMPLETE CBC W/AUTO DIFF WBC: CPT

## 2018-07-08 PROCEDURE — 700102 HCHG RX REV CODE 250 W/ 637 OVERRIDE(OP): Performed by: HOSPITALIST

## 2018-07-08 PROCEDURE — A9270 NON-COVERED ITEM OR SERVICE: HCPCS | Performed by: HOSPITALIST

## 2018-07-08 PROCEDURE — 80053 COMPREHEN METABOLIC PANEL: CPT

## 2018-07-08 RX ORDER — OXYCODONE HYDROCHLORIDE 5 MG/1
5 TABLET ORAL EVERY 6 HOURS PRN
Qty: 5 TAB | Refills: 0 | Status: SHIPPED | OUTPATIENT
Start: 2018-07-08 | End: 2018-07-11

## 2018-07-08 RX ORDER — AMOXICILLIN 250 MG
2 CAPSULE ORAL 2 TIMES DAILY
Qty: 28 TAB | Refills: 0 | Status: SHIPPED | OUTPATIENT
Start: 2018-07-08 | End: 2018-07-15

## 2018-07-08 RX ADMIN — ABACAVIR 600 MG: 300 TABLET, FILM COATED ORAL at 08:26

## 2018-07-08 RX ADMIN — DOLUTEGRAVIR SODIUM 50 MG: 50 TABLET, FILM COATED ORAL at 08:26

## 2018-07-08 RX ADMIN — STANDARDIZED SENNA CONCENTRATE AND DOCUSATE SODIUM 2 TABLET: 8.6; 5 TABLET, FILM COATED ORAL at 08:26

## 2018-07-08 RX ADMIN — OXYCODONE HYDROCHLORIDE 5 MG: 5 TABLET ORAL at 03:50

## 2018-07-08 RX ADMIN — LAMIVUDINE 300 MG: 150 TABLET, FILM COATED ORAL at 08:26

## 2018-07-08 RX ADMIN — OXYCODONE HYDROCHLORIDE 10 MG: 10 TABLET ORAL at 09:38

## 2018-07-08 ASSESSMENT — PAIN SCALES - GENERAL
PAINLEVEL_OUTOF10: 4
PAINLEVEL_OUTOF10: 2
PAINLEVEL_OUTOF10: 4

## 2018-07-08 NOTE — DISCHARGE INSTRUCTIONS
Discharge Instructions    Discharged to home by car with friend. Discharged via wheelchair, hospital escort: Yes.  Special equipment needed: Not Applicable    Be sure to schedule a follow-up appointment with your primary care doctor or any specialists as instructed.     Discharge Plan:   Smoking Cessation Offered: Patient Refused  Pneumococcal Vaccine Administered/Refused: Given (See MAR)  Influenza Vaccine Indication: Patient Refuses    I understand that a diet low in cholesterol, fat, and sodium is recommended for good health. Unless I have been given specific instructions below for another diet, I accept this instruction as my diet prescription.   Other diet: regular    Special Instructions: None    · Is patient discharged on Warfarin / Coumadin?   No     Depression / Suicide Risk    As you are discharged from this [facility] facility, it is important to learn how to keep safe from harming yourself.    Recognize the warning signs:  · Abrupt changes in personality, positive or negative- including increase in energy   · Giving away possessions  · Change in eating patterns- significant weight changes-  positive or negative  · Change in sleeping patterns- unable to sleep or sleeping all the time   · Unwillingness or inability to communicate  · Depression  · Unusual sadness, discouragement and loneliness  · Talk of wanting to die  · Neglect of personal appearance   · Rebelliousness- reckless behavior  · Withdrawal from people/activities they love  · Confusion- inability to concentrate     If you or a loved one observes any of these behaviors or has concerns about self-harm, here's what you can do:  · Talk about it- your feelings and reasons for harming yourself  · Remove any means that you might use to hurt yourself (examples: pills, rope, extension cords, firearm)  · Get professional help from the community (Mental Health, Substance Abuse, psychological counseling)  · Do not be alone:Call your Safe Contact-  someone whom you trust who will be there for you.  · Call your local CRISIS HOTLINE 128-7765 or 388-341-1592  · Call your local Children's Mobile Crisis Response Team [place] (368) 228-0511 or www.[employer] [place]  · Call the toll free National Suicide Prevention Hotlines   · National Suicide Prevention Lifeline 024-809-AXSF (7589)  · [facility] Line Network 800-SUICIDE (275-1900)    How can pain medicine affect me?  You were prescribed pain medicine. This medicine may:  · Make you tired or sleepy.  · Make you feel dizzy.  · Affect how well you can:  ¨ Drive  ¨ Do certain activities.  Pain medicine may not make all of your pain go away. You should be comfortable enough to:  · Move.  · Breathe.  · Take care of yourself.  How often should I take pain medicine and how much should I take?  · Take pain medicine only as told by your doctor and only as needed for pain.  · You do not need to take pain medicine if you are not having pain, unless your doctor tells you to do that.  · You can take less than the prescribed dose if you find that less medicine helps your pain.  · If you have very bad (severe) pain, call your doctor. Do not take more pills than told by your doctor. Do not take pills more often than told by your doctor.  What should I avoid while I am taking pain medicine?  Follow these instructions after you start taking pain medicine, while you are taking the medicine, and for 8 hours after you stop taking the medicine:  · Do not drive.  · Do not use machinery.  · Do not use power tools.  · Do not sign legal documents.  · Do not drink alcohol.  · Do not take sleeping pills.  · Do not take care of children by yourself.  · Do not do any activities that involve climbing or being in high places.  · Do not go into any body of water unless there is an adult nearby who can watch and help you. This includes:  ¨ Lakes.  ¨ Rivers.  ¨ Oceans.  ¨ Spas.  ¨ Swimming pools.  How can I keep others safe while I am taking  pain medicine?  · Store your pain medicine as told by your doctor. Make sure that you keep it where children and pets cannot reach it.  · Do not share your pain medicine with anyone.  · Do not save any leftover pills. If you have any leftover pain medicine, get rid of it or destroy it as told by your doctor.  What else do I need to know about taking pain medicine?  · Use a poop (stool) softener if you have trouble pooping (constipation) because of your pain medicine. Eating more fruits and vegetables also helps with constipation.  · Write down the times when you take your pain medicine. Look at the times before you take your next dose of medicine.  · Your pain medicine might have acetaminophen in it. Do not take any other acetaminophen while you are taking this medicine. An overdose of acetaminophen can do very bad damage to your liver. If you are taking any medicines in addition to your pain medicine, check the active ingredients on those medicines to see if acetaminophen is listed.  When should I call my doctor?  · Your medicine is not helping the pain.  · You do either of these soon after you take the medicine:  ¨ Throw up (vomit).  ¨ Have watery poop (diarrhea).  · You have new pain in areas that did not hurt before.  · You have an allergic reaction to your medicine. This may include:  ¨ Feeling itchy.  ¨ Swelling.  ¨ Feeling dizzy.  ¨ Getting a new rash.  · You cannot put up with feeling:  ¨ Dizzy.  ¨ Sick to your stomach (nauseous).  When should I call 911 or go to the emergency room?  · You pass out (faint).  · You feel very confused.  · You throw up again and again.  · Your skin or lips turn pale or bluish in color.  · You are:  ¨ Short of breath.  ¨ Breathing much more slowly than usual.  · You have a very bad allergic reaction to your medicine. This includes:  ¨ Developing a swollen tongue.  ¨ Having trouble breathing.  This information is not intended to replace advice given to you by your health care  provider. Make sure you discuss any questions you have with your health care provider.  Document Released: 06/05/2009 Document Revised: 08/24/2017 Document Reviewed: 10/22/2015  © 2017 Elsevier  Suicidal Feelings: How to Help Yourself  Suicide is the taking of one's own life. If you feel as though life is getting too tough to handle and are thinking about suicide, get help right away. To get help:  · Call your local emergency services (911 in the [place]  · Call a suicide hotline to speak with a trained counselor who understands how you are feeling. The following is a list of suicide hotlines in the [place]. For a list of hotlines in [place], visit www.suicide.org/hotlines/international/vvataj-anxmbxs-qdrhtcok.html.  ¨ 8-024-496-TALK (1-168.481.4108).  ¨ 6-102-HCHXHUW (1-595.825.8791).  ¨ 1-372.374.2456. This is a hotline for Romansh speakers.  ¨ 8-911-905-4TTY (1-230.814.5424). This is a hotline for TTY users.  ¨ 2-616-0-U-VAN (1-429.990.1218). This is a hotline for lesbian, layton, bisexual, transgender, or questioning youth.  · Contact a crisis center or a local suicide prevention center. To find a crisis center or suicide prevention center:  ¨ Call your local hospital, clinic, community service organization, mental health center, social service provider, or health department. Ask for assistance in connecting to a crisis center.  ¨ Visit www.suicidepreventionlifeline.org/getinvolved/ for a list of crisis centers in the [place], or visit www.suicideprevention.ca/qshutwex-fvczq-ornuepg/find-a-crisis-centre for a list of centers in [place].  · Visit the following websites:  ¨ National Suicide Prevention Lifeline: www.suicidepreventionlifeline.org  ¨ Hopeline: www.hopeline.com  ¨ American Foundation for Suicide Prevention: www.afsp.org  ¨ The Van Project (for lesbian, layton, bisexual, transgender, or questioning youth): www.thetrevorproject.org  How can I help myself feel better?  · Promise yourself  that you will not do anything drastic when you have suicidal feelings. Remember, there is hope. Many people have gotten through suicidal thoughts and feelings, and you will, too. You may have gotten through them before, and this proves that you can get through them again.  · Let family, friends, teachers, or counselors know how you are feeling. Try not to isolate yourself from those who care about you. Remember, they will want to help you. Talk with someone every day, even if you do not feel sociable. Face-to-face conversation is best.  · Call a mental health professional and see one regularly.  · Visit your primary health care provider every year.  · Eat a well-balanced diet, and space your meals so you eat regularly.  · Get plenty of rest.  · Avoid alcohol and drugs, and remove them from your home. They will only make you feel worse.  · If you are thinking of taking a lot of medicine, give your medicine to someone who can give it to you one day at a time. If you are on antidepressants and are concerned you will overdose, let your health care provider know so he or she can give you safer medicines. Ask your mental health professional about the possible side effects of any medicines you are taking.  · Remove weapons, poisons, knives, and anything else that could harm you from your home.  · Try to stick to routines. Follow a schedule every day. Put self-care on your schedule.  · Make a list of realistic goals, and cross them off when you achieve them. Accomplishments give a sense of worth.  · Wait until you are feeling better before doing the things you find difficult or unpleasant.  · Exercise if you are able. You will feel better if you exercise for even a half hour each day.  · Go out in the sun or into nature. This will help you recover from depression faster. If you have a favorite place to walk, go there.  · Do the things that have always given you pleasure. Play your favorite music, read a good book, paint a  picture, play your favorite instrument, or do anything else that takes your mind off your depression if it is safe to do.  · Keep your living space well lit.  · When you are feeling well, write yourself a letter about tips and support that you can read when you are not feeling well.  · Remember that life’s difficulties can be sorted out with help. Conditions can be treated. You can work on thoughts and strategies that serve you well.  This information is not intended to replace advice given to you by your health care provider. Make sure you discuss any questions you have with your health care provider.  Document Released: 06/23/2004 Document Revised: 08/16/2017 Document Reviewed: 04/14/2015  [employer] Interactive Patient Education © 2017 [employer]  Depression, Adult  Depression refers to feeling sad, low, down in the dumps, blue, gloomy, or empty. In general, there are two kinds of depression:  1. Normal sadness or normal grief. This kind of depression is one that we all feel from time to time after upsetting life experiences, such as the loss of a job or the ending of a relationship. This kind of depression is considered normal, is short lived, and resolves within a few days to 2 weeks. Depression experienced after the loss of a loved one (bereavement) often lasts longer than 2 weeks but normally gets better with time.  2. Clinical depression. This kind of depression lasts longer than normal sadness or normal grief or interferes with your ability to function at home, at work, and in school. It also interferes with your personal relationships. It affects almost every aspect of your life. Clinical depression is an illness.  Symptoms of depression can also be caused by conditions other than those mentioned above, such as:  · Physical illness. Some physical illnesses, including underactive thyroid gland (hypothyroidism), severe anemia, specific types of cancer, diabetes, uncontrolled seizures, heart and lung  problems, strokes, and chronic pain are commonly associated with symptoms of depression.  · Side effects of some prescription medicine. In some people, certain types of medicine can cause symptoms of depression.  · Substance abuse. Abuse of alcohol and illicit drugs can cause symptoms of depression.  SYMPTOMS  Symptoms of normal sadness and normal grief include the following:  · Feeling sad or crying for short periods of time.  · Not caring about anything (apathy).  · Difficulty sleeping or sleeping too much.  · No longer able to enjoy the things you used to enjoy.  · Desire to be by oneself all the time (social isolation).  · Lack of energy or motivation.  · Difficulty concentrating or remembering.  · Change in appetite or weight.  · Restlessness or agitation.  Symptoms of clinical depression include the same symptoms of normal sadness or normal grief and also the following symptoms:  · Feeling sad or crying all the time.  · Feelings of guilt or worthlessness.  · Feelings of hopelessness or helplessness.  · Thoughts of suicide or the desire to harm yourself (suicidal ideation).  · Loss of touch with reality (psychotic symptoms). Seeing or hearing things that are not real (hallucinations) or having false beliefs about your life or the people around you (delusions and paranoia).  DIAGNOSIS   The diagnosis of clinical depression is usually based on how bad the symptoms are and how long they have lasted. Your health care provider will also ask you questions about your medical history and substance use to find out if physical illness, use of prescription medicine, or substance abuse is causing your depression. Your health care provider may also order blood tests.  TREATMENT   Often, normal sadness and normal grief do not require treatment. However, sometimes antidepressant medicine is given for bereavement to ease the depressive symptoms until they resolve.  The treatment for clinical depression depends on how bad the  symptoms are but often includes antidepressant medicine, counseling with a mental health professional, or both. Your health care provider will help to determine what treatment is best for you.  Depression caused by physical illness usually goes away with appropriate medical treatment of the illness. If prescription medicine is causing depression, talk with your health care provider about stopping the medicine, decreasing the dose, or changing to another medicine.  Depression caused by the abuse of alcohol or illicit drugs goes away when you stop using these substances. Some adults need professional help in order to stop drinking or using drugs.  SEEK IMMEDIATE MEDICAL CARE IF:  · You have thoughts about hurting yourself or others.  · You lose touch with reality (have psychotic symptoms).  · You are taking medicine for depression and have a serious side effect.  FOR MORE INFORMATION  · National [place] on Mental Illness: www.kris.org   · National [place] of Mental Health: www.nimh.nih.gov      This information is not intended to replace advice given to you by your health care provider. Make sure you discuss any questions you have with your health care provider.     Document Released: 12/15/2001 Document Revised: 01/08/2016 Document Reviewed: 03/18/2013  [employer] Interactive Patient Education ©2016 [employer]  Appendicitis  Introduction  The appendix is a tube that is shaped like a finger. It is connected to the large intestine. Appendicitis means that this tube is swollen (inflamed). Without treatment, the tube can tear (rupture). This can lead to a life-threatening infection. It can also cause you to have sores (abscesses). These sores hurt.  What are the causes?  This condition may be caused by something that blocks the appendix, such as:  · A ball of poop (stool).  · Lymph glands that are bigger than normal.  Sometimes, the cause is not known.  What are the signs or symptoms?  Symptoms of this condition  include:  · Pain around the belly button (navel).  ¨ The pain moves toward the lower right belly (abdomen).  ¨ The pain can get worse with time.  ¨ The pain can get worse if you cough.  ¨ The pain can get worse if you move suddenly.  · Tenderness in the lower right belly.  · Feeling sick to your stomach (nauseous).  · Throwing up (vomiting).  · Not feeling hungry (loss of appetite).  · A fever.  · Having a hard time pooping (constipation).  · Watery poop (diarrhea).  · Not feeling well.  How is this treated?  Usually, this condition is treated by taking out the appendix (appendectomy). There are two ways that the appendix can be taken out:  · Open surgery. In this surgery, the appendix is taken out through a large cut (incision). The cut is made in the lower right belly. This surgery may be picked if:  ¨ You have scars from another surgery.  ¨ You have a bleeding condition.  ¨ You are pregnant and will be having your baby soon.  ¨ You have a condition that does not allow the other type of surgery.  · Laparoscopic surgery. In this surgery, the appendix is taken out through small cuts. Often, this surgery:  ¨ Causes less pain.  ¨ Causes fewer problems.  ¨ Is easier to heal from.  If your appendix tears and a sore forms:  · A drain may be put into the sore. The drain will be used to get rid of fluid.  · You may get an antibiotic medicine through an IV tube.  · Your appendix may or may not need to be taken out.  This information is not intended to replace advice given to you by your health care provider. Make sure you discuss any questions you have with your health care provider.  Document Released: 03/11/2013 Document Revised: 05/25/2017 Document Reviewed: 05/04/2016  © 2017 Elsevier

## 2018-07-08 NOTE — DISCHARGE SUMMARY
Discharge Summary    CHIEF COMPLAINT ON ADMISSION  Chief Complaint   Patient presents with   • Abdominal Pain       Reason for Admission  EMS     Admission Date  7/6/2018    CODE STATUS  Full    HPI & HOSPITAL COURSE  This is a 33 y.o. male here with asthma, HIV who was transferred from [place] abdominal pain and acute appendicitis without rupture. Dr. Hills with surgery was consulted and patient is s/p appendectomy 7/7. Zosyn was given prior to surgery, it was not continued after. Blood cultures x2 were negative. His initial lactate normalized with IVF. Patient was eating well. He continued to have post-op pain, worse with movement and required narcotic pain medication to control it.    In prescribing controlled substances to this patient, I certify that I have obtained and reviewed the medical history of Sy Sanz. I have also made a good sosa effort to obtain applicable records from other providers who have treated the patient and records did not demonstrate any increased risk of substance abuse that would prevent me from prescribing controlled substances.     I have conducted a physical exam and documented it. I have reviewed Mr. Sanz’s prescription history as maintained by the [place] Prescription Monitoring Program.     I have assessed the patient’s risk for abuse, dependency, and addiction using the validated Opioid Risk Tool available at https://www.mdcalc.com/pkpueb-vjbr-wcua-ort-narcotic-abuse.     Given the above, I believe the benefits of controlled substance therapy outweigh the risks. The reasons for prescribing controlled substances include non-narcotic, oral analgesic alternatives have been inadequate for pain control. Accordingly, I have discussed the risk and benefits, treatment plan, and alternative therapies with the patient.        Therefore, he is discharged in good and stable condition to home with close outpatient follow-up.      Discharge Date  7/8/2018    FOLLOW UP  "ITEMS POST DISCHARGE  Dr. Hills    DISCHARGE DIAGNOSES  Principal Problem:    Appendicitis, acute POA: Yes  Active Problems:    HIV (human immunodeficiency virus infection) (HCC) POA: Yes  Resolved Problems:    Sepsis (HCC) POA: Unknown    Hypokalemia POA: Unknown      FOLLOW UP  No future appointments.  Avinash Hills M.D.  [address]  [place] [address]  [phone]    Schedule an appointment as soon as possible for a visit in 1 week  follow up.    Pcp Unknown    Schedule an appointment as soon as possible for a visit  follow up in 1-2 weeks.      MEDICATIONS ON DISCHARGE     Medication List      START taking these medications      Instructions   oxyCODONE immediate-release 5 MG Tabs  Commonly known as:  ROXICODONE   Take 1 Tab by mouth every 6 hours as needed for Severe Pain for up to 3 days.  Dose:  5 mg     senna-docusate 8.6-50 MG Tabs  Commonly known as:  PERICOLACE or SENOKOT S   Take 2 Tabs by mouth 2 Times a Day for 7 days. For constipation  Dose:  2 Tab        CONTINUE taking these medications      Instructions   TRIUMEQ PO   Take 1 Tab by mouth every day.  Dose:  1 Tab            Allergies  Allergies   Allergen Reactions   • Morphine      Pt turned \"red\" and was put into an \"ice bath\" after receiving morphine at age 3    • Benadryl Allergy      Pt gets \"amped up, jittery and cannot sleep\"       DIET  No orders of the defined types were placed in this encounter.      ACTIVITY  As tolerated.  Weight bearing as tolerated    CONSULTATIONS  General surgery    PROCEDURES  Appendectomy    LABORATORY  Lab Results   Component Value Date    SODIUM 137 07/08/2018    POTASSIUM 4.0 07/08/2018    CHLORIDE 106 07/08/2018    CO2 25 07/08/2018    GLUCOSE 118 (H) 07/08/2018    BUN 10 07/08/2018    CREATININE 0.83 07/08/2018        Lab Results   Component Value Date    WBC 12.0 (H) 07/08/2018    HEMOGLOBIN 14.0 07/08/2018    HEMATOCRIT 39.1 (L) 07/08/2018    PLATELETCT 292 07/08/2018        Total time of the " discharge process exceeds 35 minutes.

## 2018-07-08 NOTE — CARE PLAN
Problem: Pain Management  Goal: Pain level will decrease to patient's comfort goal  Outcome: PROGRESSING AS EXPECTED  Pain well controlled with PO Oxycodone as needed

## 2018-07-12 LAB
BACTERIA BLD CULT: NORMAL
BACTERIA BLD CULT: NORMAL
SIGNIFICANT IND 70042: NORMAL
SIGNIFICANT IND 70042: NORMAL
SITE SITE: NORMAL
SITE SITE: NORMAL
SOURCE SOURCE: NORMAL
SOURCE SOURCE: NORMAL

## 2018-07-20 ENCOUNTER — HOSPITAL ENCOUNTER (EMERGENCY)
Facility: MEDICAL CENTER | Age: 33
End: 2018-07-20
Attending: EMERGENCY MEDICINE
Payer: MEDICAID

## 2018-07-20 ENCOUNTER — APPOINTMENT (OUTPATIENT)
Dept: RADIOLOGY | Facility: MEDICAL CENTER | Age: 33
End: 2018-07-20
Attending: EMERGENCY MEDICINE
Payer: MEDICAID

## 2018-07-20 VITALS
OXYGEN SATURATION: 97 % | DIASTOLIC BLOOD PRESSURE: 64 MMHG | BODY MASS INDEX: 23.67 KG/M2 | WEIGHT: 169.09 LBS | SYSTOLIC BLOOD PRESSURE: 105 MMHG | RESPIRATION RATE: 14 BRPM | HEART RATE: 66 BPM | HEIGHT: 71 IN | TEMPERATURE: 98.3 F

## 2018-07-20 DIAGNOSIS — K59.00 CONSTIPATION, UNSPECIFIED CONSTIPATION TYPE: ICD-10-CM

## 2018-07-20 DIAGNOSIS — K61.0 PERIANAL ABSCESS: ICD-10-CM

## 2018-07-20 DIAGNOSIS — R10.84 GENERALIZED ABDOMINAL PAIN: ICD-10-CM

## 2018-07-20 LAB
ALBUMIN SERPL BCP-MCNC: 3.9 G/DL (ref 3.2–4.9)
ALBUMIN/GLOB SERPL: 1.6 G/DL
ALP SERPL-CCNC: 69 U/L (ref 30–99)
ALT SERPL-CCNC: 14 U/L (ref 2–50)
ANION GAP SERPL CALC-SCNC: 6 MMOL/L (ref 0–11.9)
AST SERPL-CCNC: 18 U/L (ref 12–45)
BASOPHILS # BLD AUTO: 1 % (ref 0–1.8)
BASOPHILS # BLD: 0.08 K/UL (ref 0–0.12)
BILIRUB SERPL-MCNC: 0.5 MG/DL (ref 0.1–1.5)
BUN SERPL-MCNC: 13 MG/DL (ref 8–22)
CALCIUM SERPL-MCNC: 9 MG/DL (ref 8.5–10.5)
CHLORIDE SERPL-SCNC: 107 MMOL/L (ref 96–112)
CO2 SERPL-SCNC: 23 MMOL/L (ref 20–33)
CREAT SERPL-MCNC: 1.03 MG/DL (ref 0.5–1.4)
EOSINOPHIL # BLD AUTO: 0.2 K/UL (ref 0–0.51)
EOSINOPHIL NFR BLD: 2.5 % (ref 0–6.9)
ERYTHROCYTE [DISTWIDTH] IN BLOOD BY AUTOMATED COUNT: 44.2 FL (ref 35.9–50)
GLOBULIN SER CALC-MCNC: 2.5 G/DL (ref 1.9–3.5)
GLUCOSE SERPL-MCNC: 73 MG/DL (ref 65–99)
HCT VFR BLD AUTO: 40.4 % (ref 42–52)
HGB BLD-MCNC: 13.7 G/DL (ref 14–18)
IMM GRANULOCYTES # BLD AUTO: 0.05 K/UL (ref 0–0.11)
IMM GRANULOCYTES NFR BLD AUTO: 0.6 % (ref 0–0.9)
LACTATE BLD-SCNC: 1.2 MMOL/L (ref 0.5–2)
LIPASE SERPL-CCNC: 36 U/L (ref 11–82)
LYMPHOCYTES # BLD AUTO: 2.15 K/UL (ref 1–4.8)
LYMPHOCYTES NFR BLD: 27.4 % (ref 22–41)
MCH RBC QN AUTO: 31.4 PG (ref 27–33)
MCHC RBC AUTO-ENTMCNC: 33.9 G/DL (ref 33.7–35.3)
MCV RBC AUTO: 92.7 FL (ref 81.4–97.8)
MONOCYTES # BLD AUTO: 0.57 K/UL (ref 0–0.85)
MONOCYTES NFR BLD AUTO: 7.3 % (ref 0–13.4)
NEUTROPHILS # BLD AUTO: 4.81 K/UL (ref 1.82–7.42)
NEUTROPHILS NFR BLD: 61.2 % (ref 44–72)
NRBC # BLD AUTO: 0 K/UL
NRBC BLD-RTO: 0 /100 WBC
PLATELET # BLD AUTO: 339 K/UL (ref 164–446)
PMV BLD AUTO: 8.4 FL (ref 9–12.9)
POTASSIUM SERPL-SCNC: 3.7 MMOL/L (ref 3.6–5.5)
PROT SERPL-MCNC: 6.4 G/DL (ref 6–8.2)
RBC # BLD AUTO: 4.36 M/UL (ref 4.7–6.1)
SODIUM SERPL-SCNC: 136 MMOL/L (ref 135–145)
WBC # BLD AUTO: 7.9 K/UL (ref 4.8–10.8)

## 2018-07-20 PROCEDURE — 85025 COMPLETE CBC W/AUTO DIFF WBC: CPT

## 2018-07-20 PROCEDURE — 80053 COMPREHEN METABOLIC PANEL: CPT

## 2018-07-20 PROCEDURE — 83605 ASSAY OF LACTIC ACID: CPT

## 2018-07-20 PROCEDURE — 99285 EMERGENCY DEPT VISIT HI MDM: CPT

## 2018-07-20 PROCEDURE — 96374 THER/PROPH/DIAG INJ IV PUSH: CPT

## 2018-07-20 PROCEDURE — 74177 CT ABD & PELVIS W/CONTRAST: CPT

## 2018-07-20 PROCEDURE — 87040 BLOOD CULTURE FOR BACTERIA: CPT

## 2018-07-20 PROCEDURE — 700117 HCHG RX CONTRAST REV CODE 255: Performed by: EMERGENCY MEDICINE

## 2018-07-20 PROCEDURE — 700111 HCHG RX REV CODE 636 W/ 250 OVERRIDE (IP): Performed by: EMERGENCY MEDICINE

## 2018-07-20 PROCEDURE — 83690 ASSAY OF LIPASE: CPT

## 2018-07-20 RX ORDER — METRONIDAZOLE 500 MG/1
500 TABLET ORAL 3 TIMES DAILY
Qty: 30 TAB | Refills: 0 | Status: SHIPPED | OUTPATIENT
Start: 2018-07-20 | End: 2018-07-30

## 2018-07-20 RX ORDER — ONDANSETRON 2 MG/ML
4 INJECTION INTRAMUSCULAR; INTRAVENOUS ONCE
Status: COMPLETED | OUTPATIENT
Start: 2018-07-20 | End: 2018-07-20

## 2018-07-20 RX ORDER — CEFDINIR 300 MG/1
300 CAPSULE ORAL 2 TIMES DAILY
Qty: 20 CAP | Refills: 0 | Status: SHIPPED | OUTPATIENT
Start: 2018-07-20 | End: 2022-07-02

## 2018-07-20 RX ADMIN — IOHEXOL 100 ML: 350 INJECTION, SOLUTION INTRAVENOUS at 19:28

## 2018-07-20 RX ADMIN — ONDANSETRON HYDROCHLORIDE 4 MG: 2 INJECTION, SOLUTION INTRAMUSCULAR; INTRAVENOUS at 18:05

## 2018-07-20 ASSESSMENT — LIFESTYLE VARIABLES: DO YOU DRINK ALCOHOL: NO

## 2018-07-20 ASSESSMENT — PAIN SCALES - GENERAL: PAINLEVEL_OUTOF10: 5

## 2018-07-20 NOTE — ED NOTES
E[employer] 1 Fall Risk Assessment Tool    Present to ED because of fall  NO  (Syncope, seizure, or ALOC)  Age>70   NO  Altered Mental Status:  (Intoxicated with Alcohol or Substance Confusion,  Inability to follow instructions, disorientation)   NO  Impaired Mobility:  Ambulates or transfers with assistive devices or assist  Ambulates with unsteady gait and no assistance  Unable to ambulate or transfer   NO  Nursing Judgement  (Bowel or bladder incontinence, diarrhea, urinary   frequency or urgency, leg weakness, orthostatic   hypotension, dizziness or vertigo, narcotic use).   NO

## 2018-07-20 NOTE — ED TRIAGE NOTES
"Pt ambulates to triage  Chief Complaint   Patient presents with   • Post-Op Complications     recent appy   • Fatigue     \"sleeping all day\"   • Diarrhea     pus colored discharge, \"pocket of pus\"   • Shortness of Breath   Pt asked to wait in lobby, pt updated on triage process and pt asked to inform RN of any changes.     "

## 2018-07-20 NOTE — ED NOTES
"Pt stated around 9th or 10th he tried to  something heavy & felt a \"pop\"; afterwards he placed himself on bedrest for 4 days trying to help the pain. Abd noted as tender and tight, pt c/o nausea  "

## 2018-07-21 NOTE — DISCHARGE INSTRUCTIONS
Abdominal Pain, Adult  Abdominal pain can be caused by many things. Often, abdominal pain is not serious and it gets better with no treatment or by being treated at home. However, sometimes abdominal pain is serious. Your health care provider will do a medical history and a physical exam to try to determine the cause of your abdominal pain.  Follow these instructions at home:  · Take over-the-counter and prescription medicines only as told by your health care provider. Do not take a laxative unless told by your health care provider.  · Drink enough fluid to keep your urine clear or pale yellow.  · Watch your condition for any changes.  · Keep all follow-up visits as told by your health care provider. This is important.  Contact a health care provider if:  · Your abdominal pain changes or gets worse.  · You are not hungry or you lose weight without trying.  · You are constipated or have diarrhea for more than 2-3 days.  · You have pain when you urinate or have a bowel movement.  · Your abdominal pain wakes you up at night.  · Your pain gets worse with meals, after eating, or with certain foods.  · You are throwing up and cannot keep anything down.  · You have a fever.  Get help right away if:  · Your pain does not go away as soon as your health care provider told you to expect.  · You cannot stop throwing up.  · Your pain is only in areas of the abdomen, such as the right side or the left lower portion of the abdomen.  · You have bloody or black stools, or stools that look like tar.  · You have severe pain, cramping, or bloating in your abdomen.  · You have signs of dehydration, such as:  ¨ Dark urine, very little urine, or no urine.  ¨ Cracked lips.  ¨ Dry mouth.  ¨ Sunken eyes.  ¨ Sleepiness.  ¨ Weakness.  This information is not intended to replace advice given to you by your health care provider. Make sure you discuss any questions you have with your health care provider.  Document Released: 09/27/2006 Document  Revised: 07/07/2017 Document Reviewed: 05/31/2017  [employer] Patient Education © 2017 [employer]    Abscess  Care After  An abscess (also called a boil or furuncle) is an infected area that contains a collection of pus. Signs and symptoms of an abscess include pain, tenderness, redness, or hardness, or you may feel a moveable soft area under your skin. An abscess can occur anywhere in the body. The infection may spread to surrounding tissues causing cellulitis. A cut (incision) by the surgeon was made over your abscess and the pus was drained out. Gauze may have been packed into the space to provide a drain that will allow the cavity to heal from the inside outwards. The boil may be painful for 5 to 7 days. Most people with a boil do not have high fevers. Your abscess, if seen early, may not have localized, and may not have been lanced. If not, another appointment may be required for this if it does not get better on its own or with medications.  HOME CARE INSTRUCTIONS   · Only take over-the-counter or prescription medicines for pain, discomfort, or fever as directed by your caregiver.  · When you bathe, soak and then remove gauze or iodoform packs at least daily or as directed by your caregiver. You may then wash the wound gently with mild soapy water. Repack with gauze or do as your caregiver directs.  SEEK IMMEDIATE MEDICAL CARE IF:   · You develop increased pain, swelling, redness, drainage, or bleeding in the wound site.  · You develop signs of generalized infection including muscle aches, chills, fever, or a general ill feeling.  · An oral temperature above 102° F (38.9° C) develops, not controlled by medication.  See your caregiver for a recheck if you develop any of the symptoms described above. If medications (antibiotics) were prescribed, take them as directed.  Document Released: 07/06/2006 Document Revised: 03/11/2013 Document Reviewed: 03/02/2009  ExitCare® Patient Information ©2014  [facility].

## 2018-07-21 NOTE — ED PROVIDER NOTES
"                                                           ED provider note  CHIEF COMPLAINT  Chief Complaint   Patient presents with   • Post-Op Complications     recent appy   • Fatigue     \"sleeping all day\"   • Diarrhea     pus colored discharge, \"pocket of pus\"   • Abdominal Pain   • Shortness of Breath       HPI  Sy Sanz is a 33 y.o. male who presents planing of diffuse abdominal pain fatigue and pus draining from his perianal area.  The patient had an appendectomy for appendicitis on 7/7/2018.  He states he has not felt right ever since.  He states that he has some purulent drainage from his perianal area that he has had off and on for years but is noticed some more of it today.  He denies any nausea or vomiting.  He feels very fatigued.  He has no fevers or chills.  He does have HIV and has been taking his medications as directed.  He denies any chest pain shortness of breath or productive cough.    REVIEW OF SYSTEMS    HEENT:  No ear pain, congestion or sore throat   EYES: no discharge redness or vision changes  CARDIAC: no chest pain, palpitations    PULMONARY: no dyspnea, cough or congestion   GI: no vomiting diarrhea diffuse abdominal pain   : no dysuria, back pain or hematuria   Neuro: no weakness, numbness aphasia or headache  Musculoskeletal: no swelling deformity or pain no joint swelling  Endocrine: no fevers, sweating, weight loss   SKIN: no rash, erythema or contusions positive purulent drainage from perianal area    See history of present illness all other systems are negative       PAST MEDICAL HISTORY  Past Medical History:   Diagnosis Date   • Asthma    • HIV (human immunodeficiency virus infection) ([facility])        FAMILY HISTORY  No family history on file.    SOCIAL HISTORY  Social History     Social History   • Marital status: Single     Spouse name: N/A   • Number of children: N/A   • Years of education: N/A     Social History Main Topics   • Smoking status: Current Every Day " "Smoker     Packs/day: 0.50     Years: 4.00     Types: Cigarettes   • Smokeless tobacco: Current User   • Alcohol use No   • Drug use: Yes     Types: Inhaled      Comment: marijuana, methamphetamine (last use 1 week ago)    • Sexual activity: Not on file     Other Topics Concern   • Not on file     Social History Narrative   • No narrative on file       SURGICAL HISTORY  Past Surgical History:   Procedure Laterality Date   • APPENDECTOMY LAPAROSCOPIC  7/7/2018    Procedure: APPENDECTOMY LAPAROSCOPIC;  Surgeon: Avinash Hills M.D.;  Location: SURGERY [facility];  Service: General   • CLUB FOOT RELEASE Bilateral        CURRENT MEDICATIONS  Home Medications    **Home medications have not yet been reviewed for this encounter**          ALLERGIES  Allergies   Allergen Reactions   • Morphine      Pt turned \"red\" and was put into an \"ice bath\" after receiving morphine at age 3    • Benadryl Allergy      Pt gets \"amped up, jittery and cannot sleep\"       PHYSICAL EXAM  VITAL SIGNS: /64   Pulse 66   Temp 36.8 °C (98.3 °F)   Resp 14   Ht 1.803 m (5' 11\")   Wt 76.7 kg (169 lb 1.5 oz)   SpO2 97%   BMI 23.58 kg/m²  Room air O2: 97    Constitutional: Well developed, Well nourished, No acute distress, Non-toxic appearance.   HENT: Normocephalic, Atraumatic, Bilateral external ears normal, Oropharynx moist, No oral exudates, Nose normal.   Eyes: PERRLA, EOMI, Conjunctiva normal, No discharge.   Neck: Normal range of motion, No tenderness, Supple, No stridor.   Lymphatic: No lymphadenopathy noted.   Cardiovascular: Normal heart rate, Normal rhythm, No murmurs, No rubs, No gallops.   Thorax & Lungs: Normal breath sounds, No respiratory distress, No wheezing, No chest tenderness.   Abdomen: Positive incisions are clean dry and intact without purulence.  His abdomen is soft with some contusions from the prior surgery.  He has no rebound or peritoneal signs bowel sounds are normal  Rectal: Patient has a small hole that " is draining a small amount of purulent material with no erythema fluctuance or tenderness  Skin: Warm, Dry, No erythema, No rash.   Back: No tenderness, No CVA tenderness.   Extremities: Intact distal pulses, No edema, No tenderness, No cyanosis, No clubbing.   Neurologic: Alert & oriented x 3, Normal motor function, Normal sensory function, No focal deficits noted.       RADIOLOGY/PROCEDURES  CT-ABDOMEN-PELVIS WITH   Final Result      1.  Large amount of stool in the rectal vault with air in the rectosigmoid colon. Moderate amount of stool in the remainder of the colon. No abscess is identified.            COURSE & MEDICAL DECISION MAKING  Pertinent Labs & Imaging studies reviewed. (See chart for details)  Differential diagnosis: Intra-abdominal abscess, colitis, constipation, perirectal abscess versus perianal abscess    Results for orders placed or performed during the hospital encounter of 07/20/18   CBC WITH DIFFERENTIAL   Result Value Ref Range    WBC 7.9 4.8 - 10.8 K/uL    RBC 4.36 (L) 4.70 - 6.10 M/uL    Hemoglobin 13.7 (L) 14.0 - 18.0 g/dL    Hematocrit 40.4 (L) 42.0 - 52.0 %    MCV 92.7 81.4 - 97.8 fL    MCH 31.4 27.0 - 33.0 pg    MCHC 33.9 33.7 - 35.3 g/dL    RDW 44.2 35.9 - 50.0 fL    Platelet Count 339 164 - 446 K/uL    MPV 8.4 (L) 9.0 - 12.9 fL    Neutrophils-Polys 61.20 44.00 - 72.00 %    Lymphocytes 27.40 22.00 - 41.00 %    Monocytes 7.30 0.00 - 13.40 %    Eosinophils 2.50 0.00 - 6.90 %    Basophils 1.00 0.00 - 1.80 %    Immature Granulocytes 0.60 0.00 - 0.90 %    Nucleated RBC 0.00 /100 WBC    Neutrophils (Absolute) 4.81 1.82 - 7.42 K/uL    Lymphs (Absolute) 2.15 1.00 - 4.80 K/uL    Monos (Absolute) 0.57 0.00 - 0.85 K/uL    Eos (Absolute) 0.20 0.00 - 0.51 K/uL    Baso (Absolute) 0.08 0.00 - 0.12 K/uL    Immature Granulocytes (abs) 0.05 0.00 - 0.11 K/uL    NRBC (Absolute) 0.00 K/uL   COMP METABOLIC PANEL   Result Value Ref Range    Sodium 136 135 - 145 mmol/L    Potassium 3.7 3.6 - 5.5 mmol/L     Chloride 107 96 - 112 mmol/L    Co2 23 20 - 33 mmol/L    Anion Gap 6.0 0.0 - 11.9    Glucose 73 65 - 99 mg/dL    Bun 13 8 - 22 mg/dL    Creatinine 1.03 0.50 - 1.40 mg/dL    Calcium 9.0 8.5 - 10.5 mg/dL    AST(SGOT) 18 12 - 45 U/L    ALT(SGPT) 14 2 - 50 U/L    Alkaline Phosphatase 69 30 - 99 U/L    Total Bilirubin 0.5 0.1 - 1.5 mg/dL    Albumin 3.9 3.2 - 4.9 g/dL    Total Protein 6.4 6.0 - 8.2 g/dL    Globulin 2.5 1.9 - 3.5 g/dL    A-G Ratio 1.6 g/dL   LIPASE   Result Value Ref Range    Lipase 36 11 - 82 U/L   ESTIMATED GFR   Result Value Ref Range    GFR If African American >60 >60 mL/min/1.73 m 2    GFR If Non African American >60 >60 mL/min/1.73 m 2   LACTIC ACID   Result Value Ref Range    Lactic Acid 1.2 0.5 - 2.0 mmol/L        Patient's labs are unremarkable as well as his CT.  The small area that he is complaining of the last 20 years is draining a small amount of pus but I do not feel any fluctuance that needs to open it further and there are no evidence of perirectal abscess on CT or free air.  The patient will be discharged home with antibiotics.  He is to follow-up with Dr. Hills regarding the perianal abscess problem as well as checkup for his appendectomy.  He should follow-up with the [facility] clinic for medical problems.  Advised patient return for high fevers worsening pain or worsening symptoms.  He will be discharged home in stable condition at this time.    Avinash Hills M.D.  [address]  [facility] [address]  [phone]    Call in 2 days  for recheck, As needed, If symptoms worsen    [address] [address]  [phone]  Call in 1 day  to establish care, for recheck    Current Outpatient Prescriptions   Medication Sig Dispense Refill   • metroNIDAZOLE (FLAGYL) 500 MG Tab Take 1 Tab by mouth 3 times a day for 10 days. 30 Tab 0   • cefdinir (OMNICEF) 300 MG Cap Take 1 Cap by mouth 2 times a day. 20 Cap 0   • cefdinir (OMNICEF) 300 MG Cap Take 1 Cap  by mouth 2 times a day. 20 Cap 0   • Abacavir-Dolutegravir-Lamivud (TRIUMEQ PO) Take 1 Tab by mouth every day.           FINAL IMPRESSION  1. Generalized abdominal pain    2. Constipation, unspecified constipation type    3. Perianal abscess            Electronically signed by: Laura Carranza, 7/20/2018 8:12 PM

## 2018-11-15 ENCOUNTER — HOSPITAL ENCOUNTER (EMERGENCY)
Facility: MEDICAL CENTER | Age: 33
End: 2018-11-15
Attending: EMERGENCY MEDICINE
Payer: MEDICAID

## 2018-11-15 VITALS
SYSTOLIC BLOOD PRESSURE: 140 MMHG | DIASTOLIC BLOOD PRESSURE: 99 MMHG | BODY MASS INDEX: 24.63 KG/M2 | RESPIRATION RATE: 20 BRPM | WEIGHT: 175.93 LBS | TEMPERATURE: 98.1 F | HEART RATE: 98 BPM | HEIGHT: 71 IN | OXYGEN SATURATION: 97 %

## 2018-11-15 DIAGNOSIS — K04.7 DENTAL INFECTION: ICD-10-CM

## 2018-11-15 DIAGNOSIS — K08.89 PAIN, DENTAL: ICD-10-CM

## 2018-11-15 PROCEDURE — 700111 HCHG RX REV CODE 636 W/ 250 OVERRIDE (IP): Performed by: EMERGENCY MEDICINE

## 2018-11-15 PROCEDURE — 99285 EMERGENCY DEPT VISIT HI MDM: CPT

## 2018-11-15 PROCEDURE — 99284 EMERGENCY DEPT VISIT MOD MDM: CPT

## 2018-11-15 PROCEDURE — 96372 THER/PROPH/DIAG INJ SC/IM: CPT

## 2018-11-15 RX ORDER — NAPROXEN 500 MG/1
500 TABLET ORAL
Qty: 20 TAB | Refills: 0 | Status: SHIPPED | OUTPATIENT
Start: 2018-11-15 | End: 2018-11-20

## 2018-11-15 RX ORDER — PENICILLIN V POTASSIUM 500 MG/1
500 TABLET ORAL 4 TIMES DAILY
Qty: 40 TAB | Refills: 0 | Status: SHIPPED | OUTPATIENT
Start: 2018-11-15 | End: 2018-11-25

## 2018-11-15 RX ORDER — KETOROLAC TROMETHAMINE 30 MG/ML
30 INJECTION, SOLUTION INTRAMUSCULAR; INTRAVENOUS ONCE
Status: COMPLETED | OUTPATIENT
Start: 2018-11-15 | End: 2018-11-15

## 2018-11-15 RX ADMIN — KETOROLAC TROMETHAMINE 30 MG: 30 INJECTION, SOLUTION INTRAMUSCULAR at 02:48

## 2018-11-15 ASSESSMENT — PAIN SCALES - GENERAL
PAINLEVEL_OUTOF10: 10
PAINLEVEL_OUTOF10: 6

## 2018-11-15 ASSESSMENT — LIFESTYLE VARIABLES: DO YOU DRINK ALCOHOL: NO

## 2018-11-15 NOTE — ED PROVIDER NOTES
"ED Provider Note    ER PROVIDER NOTE        CHIEF COMPLAINT  Chief Complaint   Patient presents with   • Dental Pain       HPI  Sy Sanz is a 33 y.o. male who presents to the emergency department complaining of dental pain.  Patient reports he has had ongoing issues with his teeth although over the past few days has had more right-sided dental pain that is now going into his face.  He states he has had some slight facial swelling as well.  He denies any difficulty breathing or swallowing no fevers although he has felt hot.  No headaches although some facial pain    REVIEW OF SYSTEMS  Pertinent positives include dental pain. Pertinent negatives include no fever. See HPI for details. All other systems reviewed and are negative.    PAST MEDICAL HISTORY   has a past medical history of Asthma and HIV (human immunodeficiency virus infection) (HCC).    SOCIAL HISTORY  Social History   Substance Use Topics   • Smoking status: Current Every Day Smoker     Packs/day: 0.50     Years: 4.00     Types: Cigarettes   • Smokeless tobacco: Current User   • Alcohol use No       SURGICAL HISTORY   has a past surgical history that includes club foot release (Bilateral) and appendectomy laparoscopic (7/7/2018).    CURRENT MEDICATIONS  Home Medications    **Home medications have not yet been reviewed for this encounter**         ALLERGIES  Allergies   Allergen Reactions   • Morphine      Pt turned \"red\" and was put into an \"ice bath\" after receiving morphine at age 3    • Benadryl Allergy      Pt gets \"amped up, jittery and cannot sleep\"       PHYSICAL EXAM  VITAL SIGNS: /99   Pulse 98   Temp 36.7 °C (98.1 °F) (Temporal)   Resp 20   Ht 1.803 m (5' 11\")   Wt 79.8 kg (175 lb 14.8 oz)   SpO2 97%   BMI 24.54 kg/m²   Pulse ox interpretation: I interpret this pulse ox as normal.    Constitutional: Alert.  In no apparent distress.  HENT: Normocephalic, Atraumatic, Bilateral external ears normal. Nose normal.  Extensive " dental decay throughout mouth, with multiple tooth fragments and cavities, tenderness to percussion over remnants of first and second premolars bottom right, no intraoral swelling, no trismus, no facial tenderness or erythema, no submandibular tenderness or fullness  Eyes: Pupils are equal and reactive. Conjunctiva normal, non-icteric.   Heart: Regular rate and rhythm, no murmurs.    Lungs: Clear to auscultation bilaterally.  Skin: Warm, Dry, No erythema, No rash.   Musculoskeletal: No tenderness or major deformities noted. No edema.  Neurologic: Alert, cranial nerves intact, grossly non-focal.   Psychiatric: Affect normal, Judgment normal, Mood normal, Appears appropriate and not intoxicated.     DIAGNOSTIC STUDIES / PROCEDURES      RADIOLOGY  No orders to display     The radiologist's interpretation of all radiological studies have been reviewed by me.    COURSE & MEDICAL DECISION MAKING  Nursing notes, VS, PMSFHx reviewed in chart.    2:31 AM - Patient seen and examined at bedside. Patient will be treated with ketorolac.     Decision Making:  This is a 33 y.o. male presenting with dental pain.  Likely apical infection will start on penicillin, Naprosyn for pain, follow-up with dental clinic. No e/o ANUG as no oral lesions/bleeding or gingival inflammation, Doubt abscess/deep space with no swelling and no pronounced ttp,no facial cellulitis on exam and no systemic sx (f/c/n/v), no sinus ttp to suggest sinus thrombus or CN palsy and no other emergent condition identified.       The patient will return for new or worsening symptoms and is stable at the time of discharge.    The patient is referred to a primary physician for blood pressure management, diabetic screening, and for all other preventative health concerns.      DISPOSITION:  Patient will be discharged home in stable condition.    FOLLOW UP:  [facility]  [facility]  [place] [address]  [phone]    Schedule an appointment as soon as possible for a  visit   Dental      OUTPATIENT MEDICATIONS:  New Prescriptions    NAPROXEN (NAPROSYN) 500 MG TAB    Take 1 Tab by mouth 2 times daily with meals as needed (pain) for up to 5 days.    PENICILLIN V POTASSIUM (VEETID) 500 MG TAB    Take 1 Tab by mouth 4 times a day for 10 days.         FINAL IMPRESSION  1. Pain, dental    2. Dental infection        The note accurately reflects work and decisions made by me.  Deng Allne  11/15/2018  3:06 AM

## 2018-11-15 NOTE — ED TRIAGE NOTES
"Chief Complaint   Patient presents with   • Dental Pain     /108   Pulse 97   Temp 36.7 °C (98.1 °F) (Temporal)   Resp 16   Ht 1.803 m (5' 11\")   Wt 79.8 kg (175 lb 14.8 oz)   SpO2 99%   BMI 24.54 kg/m²     Pt has been having dental pain on right side, which has been getting worse over the last day. Pt states that pain is raveling through jaw and right side of head as well as having a migraine now. Pt has right sided facial welling, pt is missing tooth on right lower side, has had fever and chills.     States he took meth today to help with pain.    Pt placed into senior lounge.   "

## 2018-11-15 NOTE — ED NOTES
Pt rounded on, reports reduced pain and feels comfortable leaving.   D/C instructions provided to patient at bedside, verbalizes understanding and states plans for follow-up with [facility] clinic as recommended.   Scripts given  All belongings accounted for, all questions answered at this time.   Pt ambulated to lobby without assistance and states he will call for a ride.

## 2018-11-15 NOTE — ED NOTES
Pt ambulated to room with assistance from tech. ERP notified of patient's arrival. Call light within reach.

## 2019-03-15 ENCOUNTER — APPOINTMENT (OUTPATIENT)
Dept: RADIOLOGY | Facility: MEDICAL CENTER | Age: 34
End: 2019-03-15
Attending: EMERGENCY MEDICINE
Payer: MEDICAID

## 2019-03-15 ENCOUNTER — HOSPITAL ENCOUNTER (EMERGENCY)
Facility: MEDICAL CENTER | Age: 34
End: 2019-03-15
Attending: EMERGENCY MEDICINE
Payer: MEDICAID

## 2019-03-15 VITALS
HEIGHT: 71 IN | RESPIRATION RATE: 18 BRPM | OXYGEN SATURATION: 97 % | WEIGHT: 178.57 LBS | SYSTOLIC BLOOD PRESSURE: 129 MMHG | BODY MASS INDEX: 25 KG/M2 | HEART RATE: 80 BPM | TEMPERATURE: 99.1 F | DIASTOLIC BLOOD PRESSURE: 84 MMHG

## 2019-03-15 DIAGNOSIS — B34.9 VIRAL ILLNESS: ICD-10-CM

## 2019-03-15 DIAGNOSIS — R05.9 COUGH: ICD-10-CM

## 2019-03-15 LAB
BASOPHILS # BLD AUTO: 0.8 % (ref 0–1.8)
BASOPHILS # BLD: 0.07 K/UL (ref 0–0.12)
EOSINOPHIL # BLD AUTO: 0.16 K/UL (ref 0–0.51)
EOSINOPHIL NFR BLD: 1.9 % (ref 0–6.9)
ERYTHROCYTE [DISTWIDTH] IN BLOOD BY AUTOMATED COUNT: 40.8 FL (ref 35.9–50)
FLUAV RNA SPEC QL NAA+PROBE: NEGATIVE
FLUBV RNA SPEC QL NAA+PROBE: NEGATIVE
HCT VFR BLD AUTO: 45.9 % (ref 42–52)
HGB BLD-MCNC: 16 G/DL (ref 14–18)
IMM GRANULOCYTES # BLD AUTO: 0.06 K/UL (ref 0–0.11)
IMM GRANULOCYTES NFR BLD AUTO: 0.7 % (ref 0–0.9)
LYMPHOCYTES # BLD AUTO: 1.88 K/UL (ref 1–4.8)
LYMPHOCYTES NFR BLD: 22.3 % (ref 22–41)
MCH RBC QN AUTO: 31.7 PG (ref 27–33)
MCHC RBC AUTO-ENTMCNC: 34.9 G/DL (ref 33.7–35.3)
MCV RBC AUTO: 91.1 FL (ref 81.4–97.8)
MONOCYTES # BLD AUTO: 0.67 K/UL (ref 0–0.85)
MONOCYTES NFR BLD AUTO: 8 % (ref 0–13.4)
NEUTROPHILS # BLD AUTO: 5.58 K/UL (ref 1.82–7.42)
NEUTROPHILS NFR BLD: 66.3 % (ref 44–72)
NRBC # BLD AUTO: 0 K/UL
NRBC BLD-RTO: 0 /100 WBC
PLATELET # BLD AUTO: 290 K/UL (ref 164–446)
PMV BLD AUTO: 8.2 FL (ref 9–12.9)
RBC # BLD AUTO: 5.04 M/UL (ref 4.7–6.1)
WBC # BLD AUTO: 8.4 K/UL (ref 4.8–10.8)

## 2019-03-15 PROCEDURE — 85025 COMPLETE CBC W/AUTO DIFF WBC: CPT

## 2019-03-15 PROCEDURE — 36415 COLL VENOUS BLD VENIPUNCTURE: CPT

## 2019-03-15 PROCEDURE — 71045 X-RAY EXAM CHEST 1 VIEW: CPT

## 2019-03-15 PROCEDURE — 87502 INFLUENZA DNA AMP PROBE: CPT

## 2019-03-15 PROCEDURE — 99284 EMERGENCY DEPT VISIT MOD MDM: CPT | Mod: 25

## 2019-03-15 RX ORDER — BENZONATATE 100 MG/1
200 CAPSULE ORAL 3 TIMES DAILY PRN
Qty: 30 CAP | Refills: 0 | Status: SHIPPED | OUTPATIENT
Start: 2019-03-15 | End: 2022-07-02

## 2019-03-15 NOTE — ED TRIAGE NOTES
"Pt presents with a hx of Asthma.  He complains of congestion, and cough persisting for the past 3 days.   Chief Complaint   Patient presents with   • Congestion   • Asthma   • Cough     /75   Pulse 94   Temp 36.1 °C (96.9 °F) (Temporal)   Resp 18   Ht 1.803 m (5' 11\")   Wt 81 kg (178 lb 9.2 oz)   SpO2 100%   BMI 24.91 kg/m²     "

## 2019-03-16 NOTE — ED NOTES
.Pt D/C to home. VSS. IV removed. D/C instructions and 1 prescriptions given to patient. Pt verbalizes understanding. Pt leaves ED with no acute changes, complaints or concerns. Pt ambulated out with a steady gait and all belongings.

## 2019-03-16 NOTE — ED PROVIDER NOTES
"ED Provider Note    CHIEF COMPLAINT  Chief Complaint   Patient presents with   • Congestion   • Asthma   • Cough       HPI  Sy Sanz is a 33 y.o. male who presents with complaint of cough and body aches.  Started 3 days ago.  Came in because he brought in a friend for being ill and withdrawing from IV drugs.  He has not used for 4 days so some of his symptoms may be withdrawal.  He admits to being HIV positive on medications.  States his CD4 is normal and viral load is undetectable.    REVIEW OF SYSTEMS  See HPI for further details. All other systems are negative.     ALLERGIES  Allergies   Allergen Reactions   • Morphine      Pt turned \"red\" and was put into an \"ice bath\" after receiving morphine at age 3    • Benadryl Allergy      Pt gets \"amped up, jittery and cannot sleep\"       CURRENT MEDICATIONS  Home Medications    **Home medications have not yet been reviewed for this encounter**         PAST MEDICAL HISTORY  Past Medical History:   Diagnosis Date   • Asthma    • HIV (human immunodeficiency virus infection) ([facility])        SURGICAL HISTORY  Past Surgical History:   Procedure Laterality Date   • APPENDECTOMY LAPAROSCOPIC  7/7/2018    Procedure: APPENDECTOMY LAPAROSCOPIC;  Surgeon: Avinash Hills M.D.;  Location: SURGERY [facility];  Service: General   • CLUB FOOT RELEASE Bilateral        SOCIAL HISTORY  Social History     Social History   • Marital status: Single     Spouse name: N/A   • Number of children: N/A   • Years of education: N/A     Social History Main Topics   • Smoking status: Current Every Day Smoker     Packs/day: 0.50     Years: 4.00     Types: Cigarettes   • Smokeless tobacco: Never Used   • Alcohol use No   • Drug use: Yes     Types: Inhaled      Comment: marijuana, methamphetamine     • Sexual activity: Not on file     Other Topics Concern   • Not on file     Social History Narrative   • No narrative on file       FAMILY HISTORY  History reviewed. No pertinent family " "history.    VITAL SIGNS: /76   Pulse 89   Temp 36.1 °C (96.9 °F) (Temporal)   Resp 18   Ht 1.803 m (5' 11\")   Wt 81 kg (178 lb 9.2 oz)   SpO2 100%   BMI 24.91 kg/m²     PHYSICAL EXAM:    Constitutional: Well-nourished well-developed in mild distress and ill appearing on exam.  HENT: Normocephalic atraumatic, no nasal discharge or epistaxis, mucous membranes are dry.  Eyes: Sclerae anicteric and no periorbital edema  Neck: No JVD or goiter was noted.  Cardiovascular: Regular rate and rhythm without S3-S4 or murmur.  Thorax & Lungs: Clear to auscultation bilaterally without rales rhonchi or wheezing. Dry cough on exam.  Abdomen: Soft nontender nondistended without hepatosplenomegaly, no rebound rigidity or guarding, and no masses or pulsations noted.  Extremities: Atraumatic without lower extremity edema.  Neurologic: Sensory and motor is grossly intact.    IMAGING  DX-CHEST-PORTABLE (1 VIEW)   Final Result      No acute cardiopulmonary disease.          LABS  All pertinent Labs were reviewed. (See chart for details)  Results for orders placed or performed during the hospital encounter of 03/15/19   CBC WITH DIFFERENTIAL   Result Value Ref Range    WBC 8.4 4.8 - 10.8 K/uL    RBC 5.04 4.70 - 6.10 M/uL    Hemoglobin 16.0 14.0 - 18.0 g/dL    Hematocrit 45.9 42.0 - 52.0 %    MCV 91.1 81.4 - 97.8 fL    MCH 31.7 27.0 - 33.0 pg    MCHC 34.9 33.7 - 35.3 g/dL    RDW 40.8 35.9 - 50.0 fL    Platelet Count 290 164 - 446 K/uL    MPV 8.2 (L) 9.0 - 12.9 fL    Neutrophils-Polys 66.30 44.00 - 72.00 %    Lymphocytes 22.30 22.00 - 41.00 %    Monocytes 8.00 0.00 - 13.40 %    Eosinophils 1.90 0.00 - 6.90 %    Basophils 0.80 0.00 - 1.80 %    Immature Granulocytes 0.70 0.00 - 0.90 %    Nucleated RBC 0.00 /100 WBC    Neutrophils (Absolute) 5.58 1.82 - 7.42 K/uL    Lymphs (Absolute) 1.88 1.00 - 4.80 K/uL    Monos (Absolute) 0.67 0.00 - 0.85 K/uL    Eos (Absolute) 0.16 0.00 - 0.51 K/uL    Baso (Absolute) 0.07 0.00 - 0.12 K/uL    " Immature Granulocytes (abs) 0.06 0.00 - 0.11 K/uL    NRBC (Absolute) 0.00 K/uL   Influenza A/B By PCR (Adult - Flu Only)   Result Value Ref Range    Influenza virus A RNA Negative Negative    Influenza virus B, PCR Negative Negative        ER COURSE & MEDICAL DECISION MAKING  Pt treated with tylenol and motrin.  VSS.  Pt improved.  Normal CBC and negative flu.  Viral illness.  Will tx with otc meds.    FINAL IMPRESSION  1. Acute viral illness.  2.   3.   Critical care time: none    Electronically signed by: Calvin Boone, 3/15/2019 6:57 PM

## 2019-03-16 NOTE — ED NOTES
Rounding completed.  Pt is informed that he will be placed as soon as a room becomes available.  He denies any acute changes since last evaluation.

## 2019-03-23 ENCOUNTER — HOSPITAL ENCOUNTER (EMERGENCY)
Dept: HOSPITAL 8 - ED | Age: 34
Discharge: HOME | End: 2019-03-23
Payer: MEDICAID

## 2019-03-23 VITALS — SYSTOLIC BLOOD PRESSURE: 113 MMHG | DIASTOLIC BLOOD PRESSURE: 73 MMHG

## 2019-03-23 VITALS — HEIGHT: 71 IN | BODY MASS INDEX: 24.35 KG/M2 | WEIGHT: 173.94 LBS

## 2019-03-23 DIAGNOSIS — R05: ICD-10-CM

## 2019-03-23 DIAGNOSIS — B34.9: Primary | ICD-10-CM

## 2019-03-23 DIAGNOSIS — F17.200: ICD-10-CM

## 2019-03-23 PROCEDURE — 71046 X-RAY EXAM CHEST 2 VIEWS: CPT

## 2019-03-23 PROCEDURE — 99283 EMERGENCY DEPT VISIT LOW MDM: CPT

## 2019-09-03 ENCOUNTER — HOSPITAL ENCOUNTER (EMERGENCY)
Facility: MEDICAL CENTER | Age: 34
End: 2019-09-03
Payer: MEDICAID

## 2019-09-03 VITALS
HEART RATE: 98 BPM | RESPIRATION RATE: 18 BRPM | OXYGEN SATURATION: 98 % | SYSTOLIC BLOOD PRESSURE: 113 MMHG | TEMPERATURE: 96.1 F | HEIGHT: 71 IN | BODY MASS INDEX: 24.91 KG/M2 | DIASTOLIC BLOOD PRESSURE: 71 MMHG

## 2019-09-03 PROCEDURE — 302449 STATCHG TRIAGE ONLY (STATISTIC)

## 2019-09-03 NOTE — ED TRIAGE NOTES
"Chief Complaint   Patient presents with   • Eye Injury     right     /71   Pulse 98   Temp (!) 35.6 °C (96.1 °F) (Temporal)   Resp 18   Ht 1.803 m (5' 11\")   SpO2 98%   BMI 24.91 kg/m²     For three days pt has been having right eye pain, pt wears contacts and believes he has a corneal abrasion.     Has photophobia and states that sometimes he has difficulty opening other eye due to the pain.     Pt back out to lobby, asked to notify staff of changes   "

## 2020-01-14 ENCOUNTER — HOSPITAL ENCOUNTER (EMERGENCY)
Dept: HOSPITAL 8 - ED | Age: 35
Discharge: HOME | End: 2020-01-14
Payer: MEDICAID

## 2020-01-14 VITALS — HEIGHT: 71 IN | WEIGHT: 176.37 LBS | BODY MASS INDEX: 24.69 KG/M2

## 2020-01-14 VITALS — DIASTOLIC BLOOD PRESSURE: 103 MMHG | SYSTOLIC BLOOD PRESSURE: 122 MMHG

## 2020-01-14 DIAGNOSIS — L03.031: Primary | ICD-10-CM

## 2020-01-14 DIAGNOSIS — J10.1: ICD-10-CM

## 2020-01-14 LAB
ALBUMIN SERPL-MCNC: 3.7 G/DL (ref 3.4–5)
ALP SERPL-CCNC: 71 U/L (ref 45–117)
ALT SERPL-CCNC: 25 U/L (ref 12–78)
ANION GAP SERPL CALC-SCNC: 5 MMOL/L (ref 5–15)
BASOPHILS # BLD AUTO: 0.03 X10^3/UL (ref 0–0.1)
BASOPHILS NFR BLD AUTO: 0 % (ref 0–1)
BILIRUB SERPL-MCNC: 0.7 MG/DL (ref 0.2–1)
CALCIUM SERPL-MCNC: 8.5 MG/DL (ref 8.5–10.1)
CHLORIDE SERPL-SCNC: 106 MMOL/L (ref 98–107)
CREAT SERPL-MCNC: 1.17 MG/DL (ref 0.7–1.3)
CULTURE INDICATED?: YES
EOSINOPHIL # BLD AUTO: 0.03 X10^3/UL (ref 0–0.4)
EOSINOPHIL NFR BLD AUTO: 1 % (ref 1–7)
ERYTHROCYTE [DISTWIDTH] IN BLOOD BY AUTOMATED COUNT: 13.6 % (ref 9.4–14.8)
FLUAV AG SPEC QL IA: POSITIVE
LYMPHOCYTES # BLD AUTO: 0.71 X10^3/UL (ref 1–3.4)
LYMPHOCYTES NFR BLD AUTO: 12 % (ref 22–44)
MCH RBC QN AUTO: 32.6 PG (ref 27.5–34.5)
MCHC RBC AUTO-ENTMCNC: 34.4 G/DL (ref 33.2–36.2)
MCV RBC AUTO: 94.8 FL (ref 81–97)
MD: NO
MICROSCOPIC: (no result)
MONOCYTES # BLD AUTO: 0.39 X10^3/UL (ref 0.2–0.8)
MONOCYTES NFR BLD AUTO: 7 % (ref 2–9)
NEUTROPHILS # BLD AUTO: 4.56 X10^3/UL (ref 1.8–6.8)
NEUTROPHILS NFR BLD AUTO: 80 % (ref 42–75)
PLATELET # BLD AUTO: 290 X10^3/UL (ref 130–400)
PMV BLD AUTO: 7.2 FL (ref 7.4–10.4)
PROT SERPL-MCNC: 7.3 G/DL (ref 6.4–8.2)
RBC # BLD AUTO: 4.19 X10^6/UL (ref 4.38–5.82)

## 2020-01-14 PROCEDURE — 87086 URINE CULTURE/COLONY COUNT: CPT

## 2020-01-14 PROCEDURE — 36415 COLL VENOUS BLD VENIPUNCTURE: CPT

## 2020-01-14 PROCEDURE — 80053 COMPREHEN METABOLIC PANEL: CPT

## 2020-01-14 PROCEDURE — 85025 COMPLETE CBC W/AUTO DIFF WBC: CPT

## 2020-01-14 PROCEDURE — 71046 X-RAY EXAM CHEST 2 VIEWS: CPT

## 2020-01-14 PROCEDURE — 87400 INFLUENZA A/B EACH AG IA: CPT

## 2020-01-14 PROCEDURE — 99284 EMERGENCY DEPT VISIT MOD MDM: CPT

## 2020-01-14 PROCEDURE — 93005 ELECTROCARDIOGRAM TRACING: CPT

## 2020-01-14 PROCEDURE — 81001 URINALYSIS AUTO W/SCOPE: CPT

## 2020-01-14 NOTE — NUR
PT REMAINS UPRIGHT ON GURNEY AWAKE & COMFORTABLE, RESPONDS APPROP TO STAFF, 
NAD, COMFORT MEASURES PROVIDED, FRIEND AT BS, CALL LIGHT WITHIN RECH.

## 2020-01-14 NOTE — NUR
PT TO RROM FROM [facility], CHANGED INTO GOWN, UPRIGHT ON GURNEY AWAKE & 
COMFORTABLE, RESPONDS APPROP TO STAFF, ND, COMFORT MEASURES PROVIDED, FRIEND AT 
BS, CALL LIGHT WITHIN RECH.

## 2020-05-27 ENCOUNTER — HOSPITAL ENCOUNTER (EMERGENCY)
Dept: HOSPITAL 8 - ED | Age: 35
Discharge: HOME | End: 2020-05-27
Payer: MEDICAID

## 2020-05-27 VITALS — DIASTOLIC BLOOD PRESSURE: 79 MMHG | SYSTOLIC BLOOD PRESSURE: 121 MMHG

## 2020-05-27 VITALS — WEIGHT: 174.39 LBS | HEIGHT: 71 IN | BODY MASS INDEX: 24.41 KG/M2
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
## 2020-05-27 DIAGNOSIS — J45.909: ICD-10-CM
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
## 2020-05-27 DIAGNOSIS — M79.631: Primary | ICD-10-CM
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
## 2020-05-27 DIAGNOSIS — F17.290: ICD-10-CM

## 2020-05-27 PROCEDURE — 99283 EMERGENCY DEPT VISIT LOW MDM: CPT

## 2020-05-27 PROCEDURE — 90471 IMMUNIZATION ADMIN: CPT

## 2020-05-27 PROCEDURE — 90715 TDAP VACCINE 7 YRS/> IM: CPT

## 2020-05-27 NOTE — NUR
CLEANSED R FA WITH ANTIMICROBIAL SOAP AND WARM WATER. EDUCATED PT ON HOW TO 
CLEANSE WOUND AT HOME. PT VERBALIZED UNDERSTANDING.

## 2020-07-31 ENCOUNTER — HOSPITAL ENCOUNTER (EMERGENCY)
Dept: HOSPITAL 8 - ED | Age: 35
Discharge: HOME | End: 2020-07-31
Payer: MEDICAID

## 2020-07-31 VITALS — DIASTOLIC BLOOD PRESSURE: 66 MMHG | SYSTOLIC BLOOD PRESSURE: 96 MMHG

## 2020-07-31 VITALS — HEIGHT: 71 IN | BODY MASS INDEX: 24.48 KG/M2 | WEIGHT: 174.83 LBS

## 2020-07-31 DIAGNOSIS — M54.6: ICD-10-CM

## 2020-07-31 DIAGNOSIS — B34.9: Primary | ICD-10-CM

## 2020-07-31 DIAGNOSIS — Z20.828: ICD-10-CM

## 2020-07-31 DIAGNOSIS — J45.909: ICD-10-CM

## 2020-07-31 DIAGNOSIS — R06.02: ICD-10-CM

## 2020-07-31 DIAGNOSIS — R00.0: ICD-10-CM

## 2020-07-31 DIAGNOSIS — M54.5: ICD-10-CM

## 2020-07-31 LAB
ALBUMIN SERPL-MCNC: 3.4 G/DL (ref 3.4–5)
ALP SERPL-CCNC: 101 U/L (ref 45–117)
ALT SERPL-CCNC: 47 U/L (ref 12–78)
ANION GAP SERPL CALC-SCNC: 8 MMOL/L (ref 5–15)
BASOPHILS # BLD AUTO: 0.02 X10^3/UL (ref 0–0.1)
BASOPHILS NFR BLD AUTO: 0 % (ref 0–1)
BILIRUB SERPL-MCNC: 0.7 MG/DL (ref 0.2–1)
CALCIUM SERPL-MCNC: 8.5 MG/DL (ref 8.5–10.1)
CHLORIDE SERPL-SCNC: 106 MMOL/L (ref 98–107)
CREAT SERPL-MCNC: 1.16 MG/DL (ref 0.7–1.3)
EOSINOPHIL # BLD AUTO: 0.05 X10^3/UL (ref 0–0.4)
EOSINOPHIL NFR BLD AUTO: 1 % (ref 1–7)
ERYTHROCYTE [DISTWIDTH] IN BLOOD BY AUTOMATED COUNT: 12.7 % (ref 9.4–14.8)
LYMPHOCYTES # BLD AUTO: 1.36 X10^3/UL (ref 1–3.4)
LYMPHOCYTES NFR BLD AUTO: 16 % (ref 22–44)
MCH RBC QN AUTO: 31.7 PG (ref 27.5–34.5)
MCHC RBC AUTO-ENTMCNC: 33.9 G/DL (ref 33.2–36.2)
MCV RBC AUTO: 93.7 FL (ref 81–97)
MD: NO
MICROSCOPIC: (no result)
MONOCYTES # BLD AUTO: 0.42 X10^3/UL (ref 0.2–0.8)
MONOCYTES NFR BLD AUTO: 5 % (ref 2–9)
NEUTROPHILS # BLD AUTO: 6.46 X10^3/UL (ref 1.8–6.8)
NEUTROPHILS NFR BLD AUTO: 78 % (ref 42–75)
PLATELET # BLD AUTO: 371 X10^3/UL (ref 130–400)
PMV BLD AUTO: 6.6 FL (ref 7.4–10.4)
PROT SERPL-MCNC: 7.4 G/DL (ref 6.4–8.2)
RBC # BLD AUTO: 5.28 X10^6/UL (ref 4.38–5.82)

## 2020-07-31 PROCEDURE — 71045 X-RAY EXAM CHEST 1 VIEW: CPT

## 2020-07-31 PROCEDURE — 93005 ELECTROCARDIOGRAM TRACING: CPT

## 2020-07-31 PROCEDURE — 80053 COMPREHEN METABOLIC PANEL: CPT

## 2020-07-31 PROCEDURE — 72080 X-RAY EXAM THORACOLMB 2/> VW: CPT

## 2020-07-31 PROCEDURE — 99285 EMERGENCY DEPT VISIT HI MDM: CPT

## 2020-07-31 PROCEDURE — 96372 THER/PROPH/DIAG INJ SC/IM: CPT

## 2020-07-31 PROCEDURE — 87635 SARS-COV-2 COVID-19 AMP PRB: CPT

## 2020-07-31 PROCEDURE — 36415 COLL VENOUS BLD VENIPUNCTURE: CPT

## 2020-07-31 PROCEDURE — 81003 URINALYSIS AUTO W/O SCOPE: CPT

## 2020-07-31 PROCEDURE — 85025 COMPLETE CBC W/AUTO DIFF WBC: CPT

## 2020-08-01 ENCOUNTER — HOSPITAL ENCOUNTER (EMERGENCY)
Dept: HOSPITAL 8 - ED | Age: 35
Discharge: LEFT BEFORE DISCHARGE | End: 2020-08-01
Payer: MEDICAID

## 2020-08-01 VITALS — HEIGHT: 71 IN | BODY MASS INDEX: 25.15 KG/M2 | WEIGHT: 179.68 LBS

## 2020-08-01 VITALS — SYSTOLIC BLOOD PRESSURE: 129 MMHG | DIASTOLIC BLOOD PRESSURE: 75 MMHG

## 2020-08-01 DIAGNOSIS — K62.5: Primary | ICD-10-CM

## 2020-08-01 DIAGNOSIS — R10.2: ICD-10-CM

## 2020-08-01 DIAGNOSIS — J45.909: ICD-10-CM

## 2020-08-01 DIAGNOSIS — Z90.89: ICD-10-CM

## 2020-08-01 DIAGNOSIS — R06.02: ICD-10-CM

## 2020-08-01 PROCEDURE — 99283 EMERGENCY DEPT VISIT LOW MDM: CPT

## 2020-08-01 NOTE — NUR
PT WC'D TO ROOM 36 W/ C/O INCREASING SOB AND BACK SPASMS. PT STATES HE ALSO HAS 
WOUND OUTSIDE OF ANUS. PT STATES HE HAD CONTACT W/ FRIEND WHO WAS EXPOSED TO 
COVID. PT WAS SEEN HERE YESTERDAY FOR SAME. COVID PENDING. MONITORS APPLIED. PT 
POSITIONED ON GURNEY FOR COMFORT. MONITORS APPLIED. WARM BLANKET PROVIDED.

## 2020-08-01 NOTE — NUR
WENT INTO ROOM TO LET PT KNOW ABOUT PLAN FOR DC. PT NOT IN ROOM. GOWN ON 
GURNEY. PT ARMBAND ON GURNEY. NO PERSONAL BELONGINGS IN ROOM.

## 2020-08-02 ENCOUNTER — APPOINTMENT (OUTPATIENT)
Dept: RADIOLOGY | Facility: MEDICAL CENTER | Age: 35
End: 2020-08-02
Attending: EMERGENCY MEDICINE
Payer: MEDICAID

## 2020-08-02 ENCOUNTER — HOSPITAL ENCOUNTER (EMERGENCY)
Facility: MEDICAL CENTER | Age: 35
End: 2020-08-02
Attending: EMERGENCY MEDICINE
Payer: MEDICAID

## 2020-08-02 VITALS
HEIGHT: 71 IN | BODY MASS INDEX: 25 KG/M2 | TEMPERATURE: 98.6 F | OXYGEN SATURATION: 99 % | HEART RATE: 79 BPM | DIASTOLIC BLOOD PRESSURE: 74 MMHG | RESPIRATION RATE: 18 BRPM | WEIGHT: 178.57 LBS | SYSTOLIC BLOOD PRESSURE: 121 MMHG

## 2020-08-02 DIAGNOSIS — K61.1 PERIRECTAL ABSCESS: ICD-10-CM

## 2020-08-02 LAB
ANION GAP SERPL CALC-SCNC: 10 MMOL/L (ref 7–16)
BASOPHILS # BLD AUTO: 0.7 % (ref 0–1.8)
BASOPHILS # BLD: 0.06 K/UL (ref 0–0.12)
BUN SERPL-MCNC: 13 MG/DL (ref 8–22)
CALCIUM SERPL-MCNC: 8.8 MG/DL (ref 8.5–10.5)
CHLORIDE SERPL-SCNC: 102 MMOL/L (ref 96–112)
CO2 SERPL-SCNC: 24 MMOL/L (ref 20–33)
CREAT SERPL-MCNC: 1.03 MG/DL (ref 0.5–1.4)
EOSINOPHIL # BLD AUTO: 0.14 K/UL (ref 0–0.51)
EOSINOPHIL NFR BLD: 1.6 % (ref 0–6.9)
ERYTHROCYTE [DISTWIDTH] IN BLOOD BY AUTOMATED COUNT: 39.8 FL (ref 35.9–50)
GLUCOSE SERPL-MCNC: 94 MG/DL (ref 65–99)
HCT VFR BLD AUTO: 44 % (ref 42–52)
HGB BLD-MCNC: 15.4 G/DL (ref 14–18)
IMM GRANULOCYTES # BLD AUTO: 0.04 K/UL (ref 0–0.11)
IMM GRANULOCYTES NFR BLD AUTO: 0.5 % (ref 0–0.9)
LYMPHOCYTES # BLD AUTO: 2.22 K/UL (ref 1–4.8)
LYMPHOCYTES NFR BLD: 25.1 % (ref 22–41)
MCH RBC QN AUTO: 31.8 PG (ref 27–33)
MCHC RBC AUTO-ENTMCNC: 35 G/DL (ref 33.7–35.3)
MCV RBC AUTO: 90.7 FL (ref 81.4–97.8)
MONOCYTES # BLD AUTO: 0.87 K/UL (ref 0–0.85)
MONOCYTES NFR BLD AUTO: 9.8 % (ref 0–13.4)
NEUTROPHILS # BLD AUTO: 5.52 K/UL (ref 1.82–7.42)
NEUTROPHILS NFR BLD: 62.3 % (ref 44–72)
NRBC # BLD AUTO: 0 K/UL
NRBC BLD-RTO: 0 /100 WBC
PLATELET # BLD AUTO: 278 K/UL (ref 164–446)
PMV BLD AUTO: 8.3 FL (ref 9–12.9)
POTASSIUM SERPL-SCNC: 3.5 MMOL/L (ref 3.6–5.5)
RBC # BLD AUTO: 4.85 M/UL (ref 4.7–6.1)
SODIUM SERPL-SCNC: 136 MMOL/L (ref 135–145)
WBC # BLD AUTO: 8.9 K/UL (ref 4.8–10.8)

## 2020-08-02 PROCEDURE — 85025 COMPLETE CBC W/AUTO DIFF WBC: CPT

## 2020-08-02 PROCEDURE — 700117 HCHG RX CONTRAST REV CODE 255: Performed by: EMERGENCY MEDICINE

## 2020-08-02 PROCEDURE — 99283 EMERGENCY DEPT VISIT LOW MDM: CPT

## 2020-08-02 PROCEDURE — 80048 BASIC METABOLIC PNL TOTAL CA: CPT

## 2020-08-02 PROCEDURE — 72193 CT PELVIS W/DYE: CPT

## 2020-08-02 RX ORDER — AMOXICILLIN AND CLAVULANATE POTASSIUM 875; 125 MG/1; MG/1
1 TABLET, FILM COATED ORAL 2 TIMES DAILY
Qty: 20 TAB | Refills: 0 | Status: SHIPPED | OUTPATIENT
Start: 2020-08-02 | End: 2020-08-12

## 2020-08-02 RX ADMIN — IOHEXOL 100 ML: 350 INJECTION, SOLUTION INTRAVENOUS at 02:30

## 2020-08-02 ASSESSMENT — LIFESTYLE VARIABLES
EVER HAD A DRINK FIRST THING IN THE MORNING TO STEADY YOUR NERVES TO GET RID OF A HANGOVER: NO
TOTAL SCORE: 0
EVER FELT BAD OR GUILTY ABOUT YOUR DRINKING: NO
CONSUMPTION TOTAL: INCOMPLETE
HAVE PEOPLE ANNOYED YOU BY CRITICIZING YOUR DRINKING: NO
DO YOU DRINK ALCOHOL: YES
HAVE YOU EVER FELT YOU SHOULD CUT DOWN ON YOUR DRINKING: NO

## 2020-08-02 ASSESSMENT — PAIN DESCRIPTION - DESCRIPTORS: DESCRIPTORS: SHARP

## 2020-08-02 NOTE — ED TRIAGE NOTES
"Sy SOLITARIO Sanz  35 y.o. male  Chief Complaint   Patient presents with   • Rectal Pain     \"I was told I had a fissure outside the rectum in 2013 that burst. About an hour I woke up with searing pain next to my anus, it's exactly how it felt last time.\" Pt reports the pain is intense.    • Other     Pt reports the last few days he has had difficulty thinking, reading, and writing. Pt reports hx of 10 concussions and has long term memory loss. Pt axo x4. Pt reports he stopped using meth one week ago and feels that may be related.        Pt ambulatory to triage with steady gait for above complaint. Pt has significant hx of HIV.     Pt is alert and oriented, speaking in full sentences, follows commands and responds appropriately to questions. Resp are even and unlabored.   Pt placed in lobby. Pt educated on triage process. Pt encouraged to alert staff for any changes.    " Problem: Self Care Deficits Care Plan (Adult) Goal: *Acute Goals and Plan of Care (Insert Text) Description FUNCTIONAL STATUS PRIOR TO ADMISSION: Performed mobility with SBA-CGA using HHA from  intermittently, when not leaning on supportive furniture in her home. Stands to sponge bathe at sink. Reports independence with dressing. x1 fall in past year just PTA. HOME SUPPORT: The patient lived with  who provided assistance with intermittent HHA during mobility, IADLs, housekeeping and transportation. Occupational Therapy Goals Initiated 1/28/2020 1. Patient will perform lower body dressing using adaptive strategies/AE PRN with moderate assistance within 7 days. 2. Patient will perform BSC/toilet transfers with minimal assistance using RW within 7 days. 3. Patient will perform clothing management during toileting with moderate assistance within 7 days. 4. Patient will avoid extreme motions at R hip during ADLs/related mobility without cues within 7 days. Outcome: Progressing Towards Goal 
 OCCUPATIONAL THERAPY TREATMENT Patient: Franci Saez (53 y.o. female) Date: 1/29/2020 Diagnosis: Hip fracture ([address] Robert Lunch <principal problem not specified> Procedure(s) (LRB): 
RIGHT SYNTHES TROCH PLATE (REQ LATERAL, BEAN BAG AND SYNTHES) (Right) 2 Days Post-Op Precautions: Fall, TTWB(RLE, Anterior Precautions avoid extreme motions LLE) Chart, occupational therapy assessment, plan of care, and goals were reviewed. ASSESSMENT Patient continues with skilled OT services and is making slow progress towards goals. Pt continues to demonstrate decreased functional mobility, difficulty advancing LLE while adhering to TTWB precautions, desaturation with minimal activity and poor activity tolerance. O2 sats decreased to 85% on 2.5 l/min O2 via NC with minimal activity seated/standing EOB, and eventually recovered to 90% with seated RB and PLB techniques.  She continues to shuffle/slide L foot to scoot to [facility] using RW and was unable to take actual steps this session to safely complete BSC transfer. Guided Pt through BUE exercises targeting improved UB strength required for [facility] transfers and safe mobility. Continue to recommend SNF rehab at discharge. Current Level of Function Impacting Discharge (ADLs): Setup to Max A with ADLs Other factors to consider for discharge: Fall risk; Memory loss PLAN : 
Patient continues to benefit from skilled intervention to address the above impairments. Continue treatment per established plan of care. to address goals. Recommend with staff: Bed pan/female urinal for toileting needs at this time (not currently completing BSC transfers) Recommend next OT session: Issue BUE strengthening ex program to maximize Pts Recommendation for discharge: (in order for the patient to meet his/her long term goals) Therapy up to 5 days/week in SNF setting This discharge recommendation: 
Has been made in collaboration with the attending provider and/or case management IF patient discharges home will need the following DME: AE: long handled bathing and AE: long handled dressing OBJECTIVE DATA SUMMARY:  
Cognitive/Behavioral Status: 
Neurologic State: Alert Orientation Level: Oriented X4 Cognition: Appropriate decision making;Decreased attention/concentration;Decreased command following(very [place]) Perception: Appears intact Perseveration: No perseveration noted Safety/Judgement: Decreased insight into deficits; Awareness of environment; Fall prevention Functional Mobility and Transfers for ADLs: 
Bed Mobility: 
Rolling: Minimum assistance Supine to Sit: Minimum assistance Sit to Supine: Minimum assistance Scooting: Minimum assistance Transfers: 
Sit to Stand: Minimum assistance;Assist x2 Bed to Chair: (deferred due to need to return to bed for 2nd transfusion) Balance: 
Sitting: Impaired Sitting - Static: Fair (occasional) Sitting - Dynamic: Fair (occasional) Standing: Impaired Standing - Static: Fair;Constant support Standing - Dynamic : Fair;Constant support ADL Intervention: 
Upper Body Dressing Assistance Hospital Gown: Minimum  assistance(with tie at neck and buttons around IV in RUE) Cues: Verbal cues provided;Physical assistance Lower Body Dressing Assistance Socks: Total assistance (dependent) Position Performed: Supine Cognitive Retraining Safety/Judgement: Decreased insight into deficits; Awareness of environment; Fall prevention Patient recalled and demonstrated avoiding extreme planes of movement with Right LE during ADLs and functional mobility with verbal cues. Therapeutic Exercises:  
Guided Pt through UB Ex to maximize her UB strength in prep for BSC transfers and RW use while adhering to TTWB precautions. 1x5 reps bilateral shoulder protraction/retraction using Yellow Theraband (affixed to bed rail) 1x10 reps bilateral scapular upward rotation/shoulder flexion using her own 18 oz lotion bottle in room in place of weights Pain: 
C/o increased pain in RLE following session; RN aware Activity Tolerance:  
Poor Please refer to the flowsheet for vital signs taken during this treatment. After treatment patient left in no apparent distress:  
Supine in bed, Call bell within reach, Caregiver / family present, and Side rails x 3 
 
COMMUNICATION/COLLABORATION:  
The patients plan of care was discussed with: Physical Therapist, Registered Nurse, and Patient GERMANIA Dick/L Time Calculation: 39 mins

## 2020-08-02 NOTE — ED NOTES
Patient verbalizes understanding of follow up, medications, and when to return to the ED.  Discharged with one prescription.  All questions answered

## 2020-08-02 NOTE — ED NOTES
Patient was seen by Dr starkey at bedside. Rectal exam performed by MD. Patient tolerated well but painful. guic done with positive result.

## 2020-08-02 NOTE — ED PROVIDER NOTES
"ED Provider Note    CHIEF COMPLAINT  Chief Complaint   Patient presents with   • Rectal Pain     \"I was told I had a fissure outside the rectum in 2013 that burst. About an hour I woke up with searing pain next to my anus, it's exactly how it felt last time.\" Pt reports the pain is intense.    • Other     Pt reports the last few days he has had difficulty thinking, reading, and writing. Pt reports hx of 10 concussions and has long term memory loss. Pt axo x4. Pt reports he stopped using meth one week ago and feels that may be related.        HPI  Sy Sanz is a 35 y.o. male who presents to the emergency departments for complaint of low back pains, spasm and perirectal discomfort.  He notes that he was recently at [place] and diagnosed with the back spasm and prescribed Flexeril or similar medication.  He states that he has been unable to fill this and will likely build obtain at Monday.  He states that he went back to [place] and try to discuss his rectal pain however he states that he was \"blown off by the dog \"and now coming here for further evaluation.  He says he is noted to slight lump near the anus which is concerning him.  Notes he may have had prior fissure or fistula and this feeling similar.  Denies any aggravating events up to this point.  Denies any recent constipation or any other anal foreign body.  He states that he is a \"top \".  History of HIV.  Denies recent STD test was negative.    REVIEW OF SYSTEMS  See HPI for further details. All other systems are negative.     PAST MEDICAL HISTORY   has a past medical history of Asthma and HIV (human immunodeficiency virus infection) (HCC).    SOCIAL HISTORY  Social History     Tobacco Use   • Smoking status: Current Every Day Smoker     Packs/day: 0.25     Years: 4.00     Pack years: 1.00     Types: Cigarettes   • Smokeless tobacco: Never Used   Substance and Sexual Activity   • Alcohol use: No   • Drug use: Yes     Types: Inhaled     " "Comment: marijuana, methamphetamine     • Sexual activity: Not on file       SURGICAL HISTORY   has a past surgical history that includes club foot release (Bilateral) and appendectomy laparoscopic (7/7/2018).    CURRENT MEDICATIONS  Home Medications    **Home medications have not yet been reviewed for this encounter**         ALLERGIES  Allergies   Allergen Reactions   • Morphine      Pt turned \"red\" and was put into an \"ice bath\" after receiving morphine at age 3    • Benadryl Allergy      Pt gets \"amped up, jittery and cannot sleep\"       PHYSICAL EXAM  VITAL SIGNS: /74   Pulse 79   Temp 37 °C (98.6 °F) (Oral)   Resp 18   Ht 1.803 m (5' 11\")   Wt 81 kg (178 lb 9.2 oz)   SpO2 99%   BMI 24.91 kg/m²  @KAT[[id number]::@   Pulse ox interpretation: I interpret this pulse ox as normal.  Constitutional: Alert in no apparent distress.  HENT: No signs of trauma, Bilateral external ears normal, Nose normal.   Eyes: Pupils are equal and reactive, Conjunctiva normal, Non-icteric.   Neck: Normal range of motion, No tenderness, Supple, No stridor.   Cardiovascular: Regular rate and rhythm, no murmurs.   Thorax & Lungs: Normal breath sounds, No respiratory distress, No wheezing, No chest tenderness.   Abdomen: Bowel sounds normal, Soft, No tenderness, No masses, No pulsatile masses. No peritoneal signs.  : 1 cm indurated area at roughly 6:00 adjacent to the anus and into the left buttock cheek.  Extremely tender to palpation although no overlying erythema or increased warmth.  Significant tenderness with digital rectal exam.  Trace guaiac positive.  Will be unable to perform anoscopy secondary to patient discomfort.  Skin: Warm, Dry, No erythema, No rash.   Back: No bony tenderness, No CVA tenderness.   Extremities: Intact distal pulses, No edema, No tenderness  Musculoskeletal: Good range of motion in all major joints. No tenderness to palpation or major deformities noted.   Neurologic: Alert , Normal motor function, " Normal sensory function, No focal deficits noted.   Psychiatric: Affect normal, Judgment normal, Mood normal.       DIAGNOSTIC STUDIES / PROCEDURES      LABS  Results for orders placed or performed during the hospital encounter of 08/02/20   CBC WITH DIFFERENTIAL   Result Value Ref Range    WBC 8.9 4.8 - 10.8 K/uL    RBC 4.85 4.70 - 6.10 M/uL    Hemoglobin 15.4 14.0 - 18.0 g/dL    Hematocrit 44.0 42.0 - 52.0 %    MCV 90.7 81.4 - 97.8 fL    MCH 31.8 27.0 - 33.0 pg    MCHC 35.0 33.7 - 35.3 g/dL    RDW 39.8 35.9 - 50.0 fL    Platelet Count 278 164 - 446 K/uL    MPV 8.3 (L) 9.0 - 12.9 fL    Neutrophils-Polys 62.30 44.00 - 72.00 %    Lymphocytes 25.10 22.00 - 41.00 %    Monocytes 9.80 0.00 - 13.40 %    Eosinophils 1.60 0.00 - 6.90 %    Basophils 0.70 0.00 - 1.80 %    Immature Granulocytes 0.50 0.00 - 0.90 %    Nucleated RBC 0.00 /100 WBC    Neutrophils (Absolute) 5.52 1.82 - 7.42 K/uL    Lymphs (Absolute) 2.22 1.00 - 4.80 K/uL    Monos (Absolute) 0.87 (H) 0.00 - 0.85 K/uL    Eos (Absolute) 0.14 0.00 - 0.51 K/uL    Baso (Absolute) 0.06 0.00 - 0.12 K/uL    Immature Granulocytes (abs) 0.04 0.00 - 0.11 K/uL    NRBC (Absolute) 0.00 K/uL   BASIC METABOLIC PANEL   Result Value Ref Range    Sodium 136 135 - 145 mmol/L    Potassium 3.5 (L) 3.6 - 5.5 mmol/L    Chloride 102 96 - 112 mmol/L    Co2 24 20 - 33 mmol/L    Glucose 94 65 - 99 mg/dL    Bun 13 8 - 22 mg/dL    Creatinine 1.03 0.50 - 1.40 mg/dL    Calcium 8.8 8.5 - 10.5 mg/dL    Anion Gap 10.0 7.0 - 16.0   ESTIMATED GFR   Result Value Ref Range    GFR If African American >60 >60 mL/min/1.73 m 2    GFR If Non African American >60 >60 mL/min/1.73 m 2         RADIOLOGY  CT-PELVIS WITH   Final Result      A 0.9 x 1.2 cm perianal abscess in the left gluteal cleft.            COURSE & MEDICAL DECISION MAKING  Pertinent Labs & Imaging studies reviewed. (See chart for details)  Patient presented to the emergency department with perirectal discomfort.  He notes that he has been at [facility] for this recently but did not report to have any significant work-up.  Patient does have a history of homosexual sexual encounters as well as known HIV.  At this point I do not see any concerning STD findings or abnormal rectal discharge.  He does have a firm nodule as noted above which again is most likely consistent with abscess but given significant discomfort with inside the rectum on digital rectal exam further CT of the pelvis was obtained.  Fortunately it only shows a small 0.9 x 1.2 cm perianal abscess.  At this point through shared decision making we have come to the conclusion of foregoing I&D and only completing antibiotics at this point.  He will however be referred to colorectal surgery given the fact that this may be more amenable for definitive care and treatment planning with incision and drainage.  Again this is very close to the anus and sphincter.  Currently good sphincter tone.  Again patient understanding of risks and benefits of incision and drainage.  Patient will return to the emerge part with any changes or worsening in the interim.       The patient will return for worsening symptoms and is stable at the time of discharge. The patient verbalizes understanding and will comply.    FINAL IMPRESSION  1. Perirectal abscess            Electronically signed by: Tai Ayala M.D., 8/2/2020 1:06 AM

## 2021-02-19 ENCOUNTER — SLEEP CENTER VISIT (OUTPATIENT)
Dept: SLEEP MEDICINE | Facility: MEDICAL CENTER | Age: 36
End: 2021-02-19
Payer: COMMERCIAL

## 2021-02-19 VITALS
BODY MASS INDEX: 25.34 KG/M2 | WEIGHT: 181 LBS | HEART RATE: 111 BPM | DIASTOLIC BLOOD PRESSURE: 84 MMHG | SYSTOLIC BLOOD PRESSURE: 124 MMHG | RESPIRATION RATE: 14 BRPM | OXYGEN SATURATION: 97 % | HEIGHT: 71 IN

## 2021-02-19 DIAGNOSIS — G47.10 HYPERSOMNIA: ICD-10-CM

## 2021-02-19 DIAGNOSIS — G47.8 SLEEP PARALYSIS: ICD-10-CM

## 2021-02-19 DIAGNOSIS — G47.30 SLEEP DISORDER BREATHING: ICD-10-CM

## 2021-02-19 PROCEDURE — 99203 OFFICE O/P NEW LOW 30 MIN: CPT | Performed by: FAMILY MEDICINE

## 2021-02-19 NOTE — PROGRESS NOTES
"       [facility]  Consult Note     Date: 2/19/2021 / Time: 10:13 AM    Patient ID:   Name:             Sy Sanz   YOB: 1985  Age:                 35 y.o.  male   MRN:               [id number]      Thank you for requesting a sleep medicine consultation on Sy Sanz at the sleep center. He presents today with the chief complaints of \"passing out\"/ \"day dream\" for couple seconds through out the day, snoring and occasional excessive daytime sleepiness. The symptoms of \"day dreaming\" is going on since 11/2020. He is referred by Dr. Underwood for evaluation and treatment of sleep disorder breathing.     HISTORY OF PRESENT ILLNESS:       At night,  Sy Sanz goes to bed around 11 pm-12 am on weekdays and on the weekends. He gets out of bed at 7:30 am on weekdays and on the weekends.  He averages about 6 hrs of sleep on a good night and 5 hrs on a bad night. He falls asleep within 15-30 minutes. He rarely wakes up middle of the night.     He is aware of snoring but denies breathing pauses/gasping or choking in sleep.  He  denies any symptoms of restless legs syndrome such as an \"urge to move\"  He  legs in the evening or bedtime. He  denies any symptoms of narcolepsy such hypnagogic hallucination and cataplexy. However he has experienced sleep paralysis in past. It is infrequent and last episode was 6 months. However he has been having episodes of zooning out couple seconds. It is happening during driving as well which he is really concerned about. He has seen neurologist at [facility] and per pt he was told that he is having Petit mal seizure. As per him he was recommended to start epilepsy medication however he declined the treatment because he will lose his job. He social hx is significant for the use of marijuana and methamphetamine. Denies symptoms to suggest parasomnias such as sleep walking or acting out of dreams. Overall, he occasionally doesnot " finds his sleep refreshing.  In terms of  excessive daytime sleepiness,  He complains of sleepiness while  at work, however denies while reading or watching TV or while driving. He rarely take regular naps. The naps are usually 90 min long.He drinks one to two  5-hr energy drinks per day. EPWORTH is 9/24.       REVIEW OF SYSTEMS:       Constitutional: Denies fevers, Denies weight changes  Eyes: Denies changes in vision, no eye pain  Ears/Nose/Throat/Mouth: Denies nasal congestion or sore throat   Cardiovascular: Denies chest pain or palpitations   Respiratory: Denies shortness of breath , Denies cough  Gastrointestinal/Hepatic: Denies abdominal pain, nausea, vomiting, diarrhea, constipation or GI bleeding   Genitourinary: Denies bladder dysfunction, dysuria or frequency  Musculoskeletal/Rheum: Denies  joint pain and swelling   Skin/Breast: Denies rash  Neurological: Denies headache, confusion, memory loss or focal weakness/parasthesias  Psychiatric: denies mood disorder     Comprehensive review of systems form is reviewed with the patient and is attached in the EMR.     PMH:  has a past medical history of Asthma, Chickenpox, and HIV (human immunodeficiency virus infection) (HCC). He also has no past medical history of Syriac measles.  MEDS:   Current Outpatient Medications:   •  Bictegravir-Emtricitab-Tenofov (BIKTARVY PO), Take  by mouth., Disp: , Rfl:   •  benzonatate (TESSALON) 100 MG Cap, Take 2 Caps by mouth 3 times a day as needed for Cough. (Patient not taking: Reported on 2/19/2021), Disp: 30 Cap, Rfl: 0  •  cefdinir (OMNICEF) 300 MG Cap, Take 1 Cap by mouth 2 times a day. (Patient not taking: Reported on 2/19/2021), Disp: 20 Cap, Rfl: 0  •  cefdinir (OMNICEF) 300 MG Cap, Take 1 Cap by mouth 2 times a day. (Patient not taking: Reported on 2/19/2021), Disp: 20 Cap, Rfl: 0  •  Abacavir-Dolutegravir-Lamivud (TRIUMEQ PO), Take 1 Tab by mouth every day., Disp: , Rfl:   ALLERGIES:   Allergies   Allergen Reactions  "  • Morphine      Pt turned \"red\" and was put into an \"ice bath\" after receiving morphine at age 3    • Benadryl Allergy      Pt gets \"amped up, jittery and cannot sleep\"     SURGHX:   Past Surgical History:   Procedure Laterality Date   • APPENDECTOMY LAPAROSCOPIC  7/7/2018    Procedure: APPENDECTOMY LAPAROSCOPIC;  Surgeon: Avinash Hills M.D.;  Location: SURGERY [facility];  Service: General   • CLUB FOOT RELEASE Bilateral      SOCHX:  reports that he has been smoking cigarettes. He has a 1.00 pack-year smoking history. He has never used smokeless tobacco. He reports current drug use. Drug: Inhaled. He reports that he does not drink alcohol.   FH:   Family History   Problem Relation Age of Onset   • Sleep Apnea Mother    • Sleep Apnea Father        Physical Exam:  Vitals/ General Appearance:   Weight/BMI: Body mass index is 25.24 kg/m².  /84 (BP Location: Right arm, Patient Position: Sitting, BP Cuff Size: Adult)   Pulse (!) 111   Resp 14   Ht 1.803 m (5' 11\")   Wt 82.1 kg (181 lb)   SpO2 97%   Vitals:    02/19/21 1017   BP: 124/84   BP Location: Right arm   Patient Position: Sitting   BP Cuff Size: Adult   Pulse: (!) 111   Resp: 14   SpO2: 97%   Weight: 82.1 kg (181 lb)   Height: 1.803 m (5' 11\")           Constitutional: Alert, no distress, well-groomed.  Skin: No rashes in visible areas.  Eye: Round. Conjunctiva clear, lids normal. No icterus.   ENMT: Lips pink without lesions, good dentition, moist mucous membranes. Phonation normal.  Neck: No masses, no thyromegaly. Moves freely without pain.  CV: Pulse as reported by patient  Respiratory: Unlabored respiratory effort, no cough or audible wheeze  Psych: Alert and oriented x3, normal affect and mood.   INVESTIGATIONS:       ASSESSMENT AND PLAN     1. He  has symptoms of Obstructive Sleep Apnea (RAVIN). He  has excessive daytime sleepiness that  interferes with activites of daily living. The pathophysiology of RAVIN and the increased risk of " cardiovascular morbidity from untreated RAVIN is discussed in detail with the patient.     We have discussed diagnostic options including in-laboratory, attended polysomnography and home sleep testing. We have also discussed treatment options including airway pressurization, reconstructive otolaryngologic surgery, dental appliances and weight management.       Subsequently,treatment options will be discussed based on the diagnostic study. Meanwhile, He is urged to avoid supine sleep, weight gain and alcoholic beverages since all of these can worsen RAVIN. He is cautioned against drowsy driving. If He feels sleepy while driving, He must pull over for a break/nap, rather than persist on the road, in the interest of He own safety and that of others on the road.    Plan  -  He  will be scheduled for an overnight PSG to assess sleep related  breathing disorder.    2.  Hypersomnia with rare sleep paralysis and ? Micro sleep episodes. Unsure if he has been diagnosed with absent seizure however we will try to get records from his neurologist. His presenting symptoms can be due to the illicit drug use.  MSLT is ordered today however recommended to be off of any drugs including marijuana and methemphatamine for minimum of 2 weeks. Urine drug screen is ordered today which should be done 1-2 days before the MSLT.     3. Regarding treatment of other past medical problems and general health maintenance,  He is urged to follow up with PCP.

## 2021-02-24 DIAGNOSIS — G47.10 HYPERSOMNIA: ICD-10-CM

## 2021-03-09 ENCOUNTER — APPOINTMENT (OUTPATIENT)
Dept: RADIOLOGY | Facility: MEDICAL CENTER | Age: 36
End: 2021-03-09
Attending: EMERGENCY MEDICINE
Payer: COMMERCIAL

## 2021-03-09 ENCOUNTER — HOSPITAL ENCOUNTER (EMERGENCY)
Facility: MEDICAL CENTER | Age: 36
End: 2021-03-09
Attending: EMERGENCY MEDICINE
Payer: COMMERCIAL

## 2021-03-09 VITALS
BODY MASS INDEX: 25.93 KG/M2 | WEIGHT: 185.19 LBS | DIASTOLIC BLOOD PRESSURE: 65 MMHG | HEART RATE: 66 BPM | OXYGEN SATURATION: 98 % | RESPIRATION RATE: 18 BRPM | SYSTOLIC BLOOD PRESSURE: 113 MMHG | HEIGHT: 71 IN | TEMPERATURE: 97.3 F

## 2021-03-09 DIAGNOSIS — B20 HISTORY OF HIV INFECTION (HCC): ICD-10-CM

## 2021-03-09 DIAGNOSIS — G89.29 CHRONIC DENTAL PAIN: ICD-10-CM

## 2021-03-09 DIAGNOSIS — K02.9 DENTAL CARIES: ICD-10-CM

## 2021-03-09 DIAGNOSIS — K08.9 CHRONIC DENTAL PAIN: ICD-10-CM

## 2021-03-09 DIAGNOSIS — R19.7 DIARRHEA, UNSPECIFIED TYPE: ICD-10-CM

## 2021-03-09 LAB
ALBUMIN SERPL BCP-MCNC: 4.3 G/DL (ref 3.2–4.9)
ALBUMIN/GLOB SERPL: 1.4 G/DL
ALP SERPL-CCNC: 96 U/L (ref 30–99)
ALT SERPL-CCNC: 19 U/L (ref 2–50)
ANION GAP SERPL CALC-SCNC: 9 MMOL/L (ref 7–16)
APPEARANCE UR: CLEAR
AST SERPL-CCNC: 18 U/L (ref 12–45)
BASOPHILS # BLD AUTO: 0.7 % (ref 0–1.8)
BASOPHILS # BLD: 0.06 K/UL (ref 0–0.12)
BILIRUB SERPL-MCNC: 0.4 MG/DL (ref 0.1–1.5)
BILIRUB UR QL STRIP.AUTO: NEGATIVE
BUN SERPL-MCNC: 10 MG/DL (ref 8–22)
CALCIUM SERPL-MCNC: 8.9 MG/DL (ref 8.4–10.2)
CHLORIDE SERPL-SCNC: 101 MMOL/L (ref 96–112)
CK SERPL-CCNC: 93 U/L (ref 0–154)
CO2 SERPL-SCNC: 27 MMOL/L (ref 20–33)
COLOR UR: YELLOW
CREAT SERPL-MCNC: 0.94 MG/DL (ref 0.5–1.4)
EOSINOPHIL # BLD AUTO: 0.11 K/UL (ref 0–0.51)
EOSINOPHIL NFR BLD: 1.3 % (ref 0–6.9)
ERYTHROCYTE [DISTWIDTH] IN BLOOD BY AUTOMATED COUNT: 43.8 FL (ref 35.9–50)
FLUAV RNA SPEC QL NAA+PROBE: NEGATIVE
FLUBV RNA SPEC QL NAA+PROBE: NEGATIVE
GLOBULIN SER CALC-MCNC: 3 G/DL (ref 1.9–3.5)
GLUCOSE SERPL-MCNC: 105 MG/DL (ref 65–99)
GLUCOSE UR STRIP.AUTO-MCNC: NEGATIVE MG/DL
HCT VFR BLD AUTO: 46.9 % (ref 42–52)
HGB BLD-MCNC: 16.3 G/DL (ref 14–18)
IMM GRANULOCYTES # BLD AUTO: 0.05 K/UL (ref 0–0.11)
IMM GRANULOCYTES NFR BLD AUTO: 0.6 % (ref 0–0.9)
KETONES UR STRIP.AUTO-MCNC: NEGATIVE MG/DL
LACTATE BLD-SCNC: 1.8 MMOL/L (ref 0.5–2)
LEUKOCYTE ESTERASE UR QL STRIP.AUTO: NEGATIVE
LIPASE SERPL-CCNC: 27 U/L (ref 7–58)
LYMPHOCYTES # BLD AUTO: 2.04 K/UL (ref 1–4.8)
LYMPHOCYTES NFR BLD: 24.1 % (ref 22–41)
MAGNESIUM SERPL-MCNC: 1.7 MG/DL (ref 1.5–2.5)
MCH RBC QN AUTO: 32.3 PG (ref 27–33)
MCHC RBC AUTO-ENTMCNC: 34.8 G/DL (ref 33.7–35.3)
MCV RBC AUTO: 92.9 FL (ref 81.4–97.8)
MICRO URNS: NORMAL
MONOCYTES # BLD AUTO: 0.57 K/UL (ref 0–0.85)
MONOCYTES NFR BLD AUTO: 6.7 % (ref 0–13.4)
NEUTROPHILS # BLD AUTO: 5.63 K/UL (ref 1.82–7.42)
NEUTROPHILS NFR BLD: 66.6 % (ref 44–72)
NITRITE UR QL STRIP.AUTO: NEGATIVE
NRBC # BLD AUTO: 0 K/UL
NRBC BLD-RTO: 0 /100 WBC
PH UR STRIP.AUTO: 5.5 [PH] (ref 5–8)
PLATELET # BLD AUTO: 362 K/UL (ref 164–446)
PMV BLD AUTO: 8.4 FL (ref 9–12.9)
POTASSIUM SERPL-SCNC: 3.9 MMOL/L (ref 3.6–5.5)
PROT SERPL-MCNC: 7.3 G/DL (ref 6–8.2)
PROT UR QL STRIP: NEGATIVE MG/DL
RBC # BLD AUTO: 5.05 M/UL (ref 4.7–6.1)
RBC UR QL AUTO: NEGATIVE
RSV RNA SPEC QL NAA+PROBE: NEGATIVE
SARS-COV-2 RNA RESP QL NAA+PROBE: NOTDETECTED
SODIUM SERPL-SCNC: 137 MMOL/L (ref 135–145)
SP GR UR STRIP.AUTO: 1.01
SPECIMEN SOURCE: NORMAL
WBC # BLD AUTO: 8.5 K/UL (ref 4.8–10.8)

## 2021-03-09 PROCEDURE — 81003 URINALYSIS AUTO W/O SCOPE: CPT

## 2021-03-09 PROCEDURE — C9803 HOPD COVID-19 SPEC COLLECT: HCPCS | Performed by: EMERGENCY MEDICINE

## 2021-03-09 PROCEDURE — 87177 OVA AND PARASITES SMEARS: CPT

## 2021-03-09 PROCEDURE — 83735 ASSAY OF MAGNESIUM: CPT

## 2021-03-09 PROCEDURE — 36415 COLL VENOUS BLD VENIPUNCTURE: CPT

## 2021-03-09 PROCEDURE — 74177 CT ABD & PELVIS W/CONTRAST: CPT

## 2021-03-09 PROCEDURE — 93005 ELECTROCARDIOGRAM TRACING: CPT | Performed by: EMERGENCY MEDICINE

## 2021-03-09 PROCEDURE — 700117 HCHG RX CONTRAST REV CODE 255: Performed by: EMERGENCY MEDICINE

## 2021-03-09 PROCEDURE — 96374 THER/PROPH/DIAG INJ IV PUSH: CPT | Mod: XU

## 2021-03-09 PROCEDURE — 87040 BLOOD CULTURE FOR BACTERIA: CPT | Mod: 91

## 2021-03-09 PROCEDURE — 87086 URINE CULTURE/COLONY COUNT: CPT

## 2021-03-09 PROCEDURE — 87493 C DIFF AMPLIFIED PROBE: CPT

## 2021-03-09 PROCEDURE — 82550 ASSAY OF CK (CPK): CPT

## 2021-03-09 PROCEDURE — 96375 TX/PRO/DX INJ NEW DRUG ADDON: CPT

## 2021-03-09 PROCEDURE — 83605 ASSAY OF LACTIC ACID: CPT

## 2021-03-09 PROCEDURE — 0241U HCHG SARS-COV-2 COVID-19 NFCT DS RESP RNA 4 TRGT MIC: CPT

## 2021-03-09 PROCEDURE — 99284 EMERGENCY DEPT VISIT MOD MDM: CPT

## 2021-03-09 PROCEDURE — 87209 SMEAR COMPLEX STAIN: CPT

## 2021-03-09 PROCEDURE — 83690 ASSAY OF LIPASE: CPT

## 2021-03-09 PROCEDURE — 700111 HCHG RX REV CODE 636 W/ 250 OVERRIDE (IP): Performed by: EMERGENCY MEDICINE

## 2021-03-09 PROCEDURE — 85025 COMPLETE CBC W/AUTO DIFF WBC: CPT

## 2021-03-09 PROCEDURE — 80053 COMPREHEN METABOLIC PANEL: CPT

## 2021-03-09 RX ORDER — AMOXICILLIN AND CLAVULANATE POTASSIUM 875; 125 MG/1; MG/1
1 TABLET, FILM COATED ORAL 2 TIMES DAILY
Qty: 14 TABLET | Refills: 0 | Status: SHIPPED | OUTPATIENT
Start: 2021-03-09 | End: 2021-03-16

## 2021-03-09 RX ORDER — HYDROMORPHONE HYDROCHLORIDE 1 MG/ML
0.5 INJECTION, SOLUTION INTRAMUSCULAR; INTRAVENOUS; SUBCUTANEOUS ONCE
Status: COMPLETED | OUTPATIENT
Start: 2021-03-09 | End: 2021-03-09

## 2021-03-09 RX ORDER — KETOROLAC TROMETHAMINE 30 MG/ML
15 INJECTION, SOLUTION INTRAMUSCULAR; INTRAVENOUS ONCE
Status: COMPLETED | OUTPATIENT
Start: 2021-03-09 | End: 2021-03-09

## 2021-03-09 RX ADMIN — IOHEXOL 100 ML: 350 INJECTION, SOLUTION INTRAVENOUS at 19:42

## 2021-03-09 RX ADMIN — KETOROLAC TROMETHAMINE 15 MG: 30 INJECTION, SOLUTION INTRAMUSCULAR at 21:34

## 2021-03-09 RX ADMIN — HYDROMORPHONE HYDROCHLORIDE 0.5 MG: 1 INJECTION, SOLUTION INTRAMUSCULAR; INTRAVENOUS; SUBCUTANEOUS at 20:10

## 2021-03-10 LAB
C DIFF DNA SPEC QL NAA+PROBE: NEGATIVE
C DIFF TOX GENS STL QL NAA+PROBE: NEGATIVE
EKG IMPRESSION: NORMAL

## 2021-03-10 NOTE — ED NOTES
"Pt OOB and ambulated strong and steady to BR  Stool and urine samples rec'ed and sent to lab  C/o R side jaw pain  Pervious IV pain med did not \"work at all\" per pt     "

## 2021-03-10 NOTE — DISCHARGE INSTRUCTIONS
You were seen and evaluated in the Emergency Department at [facility] for:     Diarrhea for several weeks.    You had the following tests and studies:    Thankfully your work-up today is mostly reassuring, we are still pending stool studies to see if there is any dangerous cause of your diarrhea but thankfully your blood work and CT scan look great.    You received the following medications:    Pain and anti-inflammatory medicines.    You received the following prescriptions:    Augmentin; this is to treat your dental pain if this continues/worsens, but if your dental pain gets better do not start this medicine as it might worsen your diarrhea.  ----------------------------    Please make sure to follow up with:    [facility] for recheck and routine care.  [facility] for emergent dental care.  If you have any worsening pain or diarrhea or trouble swallowing or breathing or fevers or vomiting or any other concerns come back to the ER right away.    Your doctor will call you tomorrow with results of stool studies.    Good luck, we hope you get better soon!  ----------------------------    We always encourage patients to return IMMEDIATELY if they have:  Increased or changing pain, passing out, fevers over 100.4 (taken in your mouth or rectally) for more than 2 days, redness or swelling of skin or tissues, feeling like your heart is beating fast, chest pain that is new or worsening, trouble breathing, feeling like your throat is closing up and can not breath, inability to walk, weakness of any part of your body, new dizziness, severe bleeding that won't stop from any part of your body, if you can't eat or drink, or if you have any other concerns.   If you feel worse, please know that you can always return with any questions, concerns, worse symptoms, or you are feeling unsafe. We certainly cannot say for sure that we have ruled out every illness or dangerous disease, but we feel that at  this specific time, your exam, tests, and vital signs like heart rate and blood pressure are safe for discharge.

## 2021-03-10 NOTE — ED NOTES
Pt rec'ed good effect after IV med  Pain was 10/10 and 4/10  VSS  Cleared for d/c Rx augmentin given  Pt aware to fill and take as prescribed  To f/u w/ dentist as soon as possible  To return for worsening symptoms  D/c'ed to home in NAD

## 2021-03-10 NOTE — ED TRIAGE NOTES
"Pt presents by self from home for persistent diarrhea and occasional fecal incontinence since 2/20/21. Reports BM 6-10/day. Has lost 18 lb in past week. Using imodium without relief. Has \"pus and frothy stool\" today. Hx of HIV.     Patient masked. No respiratory symptoms, no recent travel, denies known COVID exposure.      "

## 2021-03-10 NOTE — ED NOTES
IV med given per order for c/o R jaw pain  MD at  for re-evaluation and discussion of results and after care  Pt verbally understands

## 2021-03-10 NOTE — ED PROVIDER NOTES
ED Provider Note    CHIEF COMPLAINT  Chief Complaint   Patient presents with   • Diarrhea       [facility]    Primary care provider: [facility]  Means of arrival: POV/walk-in  History obtained from: Patient  History limited by: Nothing    Sy Sanz is a 35 y.o. male who presents with diarrhea for 3 weeks.  No inciting events or new foods.  No travel.  No fevers.  He reports 6-10 episodes of watery diarrhea daily.  He says he may have lost up to 18 pounds in this time window.  Past medical history significant for HIV which she has had for years, compliant with his Biktarvy/HAART therapy.  Never had a diagnosis of AIDS.  No sick contacts.  Complains of crampy generalized nonradiating mild constant abdominal pain over the same time course.  Denies chest pain.  No cough or known close Covid contacts.  Has tried Imodium several times without relief.  Never had anything like this before.  Denies bloody output.     REVIEW OF SYSTEMS  Constitutional: Negative for fever or chills.   HENT: Negative for nosebleeds or sore throat.    Eyes: Negative for vision changes or discharge.   Respiratory: Negative for cough or shortness of breath.    Cardiovascular: Negative for chest pain or palpitations.   Gastrointestinal: Negative for nausea, vomiting, abdominal pain.   Genitourinary: Negative for dysuria or flank pain.   Musculoskeletal: Negative for back pain or joint pain.   Skin: Negative for itching or rash.   Neurological: Negative for sensory or motor changes.   Endo/Heme/Allergies: Positive for weight loss, no hives.  See HPI for further details. All other systems are negative.     PAST MEDICAL HISTORY   has a past medical history of Asthma, Chickenpox, and HIV (human immunodeficiency virus infection) ([facility]).    PAST FAMILY HISTORY  Family History   Problem Relation Age of Onset   • Sleep Apnea Mother    • Sleep Apnea Father        SOCIAL HISTORY  Social History     Tobacco Use   • Smoking status: Former Smoker      "Packs/day: 0.25     Years: 4.00     Pack years: 1.00     Types: Cigarettes   • Smokeless tobacco: Never Used   Substance and Sexual Activity   • Alcohol use: No   • Drug use: Yes     Types: Inhaled     Comment: marijuana, methamphetamine     • Sexual activity: Not on file       SURGICAL HISTORY   has a past surgical history that includes club foot release (Bilateral) and appendectomy laparoscopic (7/7/2018).    CURRENT MEDICATIONS  Home Medications     Reviewed by Valeria Rowland R.N. (Registered Nurse) on 03/09/21 at 1811  Med List Status: Not Addressed   Medication Last Dose Status   Abacavir-Dolutegravir-Lamivud (TRIUMEQ PO)  Active   benzonatate (TESSALON) 100 MG Cap  Active   Bictegravir-Emtricitab-Tenofov (BIKTARVY PO)  Active   cefdinir (OMNICEF) 300 MG Cap  Active   cefdinir (OMNICEF) 300 MG Cap  Active                ALLERGIES  Allergies   Allergen Reactions   • Morphine      Pt turned \"red\" and was put into an \"ice bath\" after receiving morphine at age 3    • Benadryl Allergy      Pt gets \"amped up, jittery and cannot sleep\"       PHYSICAL EXAM  VITAL SIGNS: /65   Pulse 66   Temp 36.3 °C (97.3 °F) (Temporal)   Resp 18   Ht 1.803 m (5' 11\")   Wt 84 kg (185 lb 3 oz)   SpO2 98%   BMI 25.83 kg/m²    Pulse ox interpretation: On room air, I interpret this pulse ox as normal.  Constitutional: Lying on the stretcher in moderate distress.  HEENT: Normocephalic, atraumatic. Posterior pharynx clear, mucous membranes dry.  Poor dentition no abscess floor of mouth soft uvula midline normal voice.  Eyes:  EOMI. Normal sclerae.  Neck: Supple, nontender.  Chest/Pulmonary: Clear to ausculation bilaterally, no wheezes or rhonchi.  Cardiovascular: Regular rate and rhythm, no murmur.   Abdomen: Soft, mild generalized tenderness; no rebound, guarding, or masses.  Back: No CVA or midline tenderness.   Musculoskeletal: No deformity or edema.  Neuro: Clear speech, normal coordination, cranial nerves II-XII grossly " intact, no focal asymmetry or sensory deficits.   Psych: Distressed but cooperative.  Skin: No rashes, warm and dry.      DIAGNOSTIC STUDIES / PROCEDURES    LABS & EKG  Results for orders placed or performed during the hospital encounter of 03/09/21   CBC WITH DIFFERENTIAL   Result Value Ref Range    WBC 8.5 4.8 - 10.8 K/uL    RBC 5.05 4.70 - 6.10 M/uL    Hemoglobin 16.3 14.0 - 18.0 g/dL    Hematocrit 46.9 42.0 - 52.0 %    MCV 92.9 81.4 - 97.8 fL    MCH 32.3 27.0 - 33.0 pg    MCHC 34.8 33.7 - 35.3 g/dL    RDW 43.8 35.9 - 50.0 fL    Platelet Count 362 164 - 446 K/uL    MPV 8.4 (L) 9.0 - 12.9 fL    Neutrophils-Polys 66.60 44.00 - 72.00 %    Lymphocytes 24.10 22.00 - 41.00 %    Monocytes 6.70 0.00 - 13.40 %    Eosinophils 1.30 0.00 - 6.90 %    Basophils 0.70 0.00 - 1.80 %    Immature Granulocytes 0.60 0.00 - 0.90 %    Nucleated RBC 0.00 /100 WBC    Neutrophils (Absolute) 5.63 1.82 - 7.42 K/uL    Lymphs (Absolute) 2.04 1.00 - 4.80 K/uL    Monos (Absolute) 0.57 0.00 - 0.85 K/uL    Eos (Absolute) 0.11 0.00 - 0.51 K/uL    Baso (Absolute) 0.06 0.00 - 0.12 K/uL    Immature Granulocytes (abs) 0.05 0.00 - 0.11 K/uL    NRBC (Absolute) 0.00 K/uL   COMP METABOLIC PANEL   Result Value Ref Range    Sodium 137 135 - 145 mmol/L    Potassium 3.9 3.6 - 5.5 mmol/L    Chloride 101 96 - 112 mmol/L    Co2 27 20 - 33 mmol/L    Anion Gap 9.0 7.0 - 16.0    Glucose 105 (H) 65 - 99 mg/dL    Bun 10 8 - 22 mg/dL    Creatinine 0.94 0.50 - 1.40 mg/dL    Calcium 8.9 8.4 - 10.2 mg/dL    AST(SGOT) 18 12 - 45 U/L    ALT(SGPT) 19 2 - 50 U/L    Alkaline Phosphatase 96 30 - 99 U/L    Total Bilirubin 0.4 0.1 - 1.5 mg/dL    Albumin 4.3 3.2 - 4.9 g/dL    Total Protein 7.3 6.0 - 8.2 g/dL    Globulin 3.0 1.9 - 3.5 g/dL    A-G Ratio 1.4 g/dL   LIPASE   Result Value Ref Range    Lipase 27 7 - 58 U/L   URINALYSIS    Specimen: Urine, Clean Catch   Result Value Ref Range    Color Yellow     Character Clear     Specific Gravity 1.015 <1.035    Ph 5.5 5.0 - 8.0     Glucose Negative Negative mg/dL    Ketones Negative Negative mg/dL    Protein Negative Negative mg/dL    Bilirubin Negative Negative    Nitrite Negative Negative    Leukocyte Esterase Negative Negative    Occult Blood Negative Negative    Micro Urine Req see below    LACTIC ACID   Result Value Ref Range    Lactic Acid 1.8 0.5 - 2.0 mmol/L   MAGNESIUM   Result Value Ref Range    Magnesium 1.7 1.5 - 2.5 mg/dL   BLOOD CULTURE    Specimen: Peripheral; Blood   Result Value Ref Range    Significant Indicator NEG     Source BLD     Site PERIPHERAL     Culture Result       No Growth  Note: Blood cultures are incubated for 5 days and  are monitored continuously.Positive blood cultures  are called to the RN and reported as soon as  they are identified.  Blood culture testing and Gram stain, if indicated, are  performed at [facility], [address] [place] [address].  Positive blood cultures are  sent to [facility], [address] [place] [address], for organism identification and  susceptibility testing.     BLOOD CULTURE    Specimen: Peripheral; Blood   Result Value Ref Range    Significant Indicator NEG     Source BLD     Site PERIPHERAL     Culture Result       No Growth  Note: Blood cultures are incubated for 5 days and  are monitored continuously.Positive blood cultures  are called to the RN and reported as soon as  they are identified.  Blood culture testing and Gram stain, if indicated, are  performed at [facility], [facility]  [facility], [place].  Positive blood cultures are  sent to [facility], [address] [place] [address], for organism identification and  susceptibility testing.     URINE CULTURE(NEW)    Specimen: Urine   Result Value Ref Range    Significant Indicator NEG     Source UR     Site -     Culture Result No growth at 24 hours.    CREATINE KINASE   Result Value Ref Range    CPK Total 93 0 - 154 U/L    COV-2, FLU A/B, AND RSV BY PCR (2-4 HOURS CEPHEID): Collect NP swab in VTM    Specimen: Nasopharyngeal; Respirate   Result Value Ref Range    Influenza virus A RNA Negative Negative    Influenza virus B, PCR Negative Negative    RSV, PCR Negative Negative    SARS-CoV-2 by PCR NotDetected     SARS-CoV-2 Source NP Swab    ESTIMATED GFR   Result Value Ref Range    GFR If African American >60 >60 mL/min/1.73 m 2    GFR If Non African American >60 >60 mL/min/1.73 m 2   Cdiff By PCR Rflx Toxin   Result Value Ref Range    C Diff by PCR Negative Negative    [id number] Presumptive Negative Negative   EKG   Result Value Ref Range    Report       [facility] Emergency Dept.    Test Date:  2021  Pt Name:    MALIKA MEDEIROS         Department: EDS  MRN:        [id number]                      [facility]  Gender:     Male                         Technician: [id number]  :        1985                   Requested By:MATTI DUARTE  Order #:    [id number]                    Reading MD: Matti Duarte MD    Measurements  Intervals                                Axis  Rate:       83                           P:          81  TN:         116                          QRS:        -3  QRSD:       115                          T:          56  QT:         361  QTc:        425    Interpretive Statements  Sinus rhythm  Borderline short TN interval  Nonspecific intraventricular conduction delay  No previous ECG available for comparison  No STEMI  Electronically Signed On 3- 20:10:55 PST by Matti Duarte MD         RADIOLOGY  CT-ABDOMEN-PELVIS WITH   Final Result      1.  Negative contrast-enhanced CT of the abdomen and pelvis.      2.  No evidence of bowel obstruction or inflammation.      3.  No residual perianal inflammation.      4.  No adenopathy identified.          COURSE & MEDICAL DECISION MAKING    This is a 35 y.o. male who presents with diarrhea for several weeks, known HIV  infection.    Differential Diagnosis includes but is not limited to:  Parasitic disease, enteritis, abscess, dehydration, C. difficile colitis, AIDS, foodborne illness    ED Course:  35-year-old male with known HIV infection with diarrhea for several weeks.  This is a concerning history so plan extensive work-up, including labs and imaging.    Thankfully the patient's labs are all entirely stable he has no neutropenia electrolytes are stable no acidosis.  No UTI.  No lactic acidosis doubt ischemic gut.  CT nothing acute or surgical.    Patient complaining of dental pain, no obvious abscess.  Will give a wait-and-see prescription for Augmentin because I think it would be best if he avoided antibiotics given his diarrhea.  Recommend Pepto-Bismol, C. difficile and ova and parasite cultures are pending but with normal white count and reassuring CT we will follow up on these studies.  If his symptoms continue or worsen come back to the ER immediately.  Follow-up with primary care provider regarding diarrhea and HIV, follow-up with dental clinic as soon as possible for definitive dental care.    Medications   HYDROmorphone pf (DILAUDID) injection 0.5 mg (0.5 mg Intravenous Given 3/9/21 2010)   iohexol (OMNIPAQUE) 350 mg/mL (100 mL Intravenous Given 3/9/21 1942)   ketorolac (TORADOL) injection 15 mg (15 mg Intravenous Given 3/9/21 2134)       FINAL IMPRESSION  1. Diarrhea, unspecified type    2. History of HIV infection (HCC)    3. Chronic dental pain    4. Dental caries        PRESCRIPTIONS  Discharge Medication List as of 3/9/2021 10:04 PM      START taking these medications    Details   amoxicillin-clavulanate (AUGMENTIN) 875-125 MG Tab Take 1 tablet by mouth 2 times a day for 7 days., Disp-14 tablet, R-0, Print Rx Paper             FOLLOW UP  [facility], Emergency Dept  [address]  [facility] [address] [phone]  Today  If you have ANY new or worse symptoms!    [facility]  [address] [address]  [phone]  Schedule an appointment as soon as possible for a visit in 2 days  for recheck and routine health care      -DISCHARGE-       Results, exam findings, clinical impression, presumed diagnosis, treatment options, and strict return precautions were discussed with the patient, and they verbalized understanding, agreed with, and appreciated the plan of care.    Pertinent Labs & Imaging studies reviewed and verified by myself, as well as nursing notes and the patient's past medical, family, and social histories (See chart for details).    Portions of this record were made with voice recognition software.  Despite my review, spelling/grammar/context errors may still remain.  Interpretation of this chart should be taken in this context.    Electronically signed by Matti Bowser M.D. on 3/11/2021 at 9:31 PM.

## 2021-03-11 NOTE — ED PROVIDER NOTES
ED Provider Note    CHIEF COMPLAINT  Chief Complaint   Patient presents with   • Diarrhea       [facility]    Primary care provider: [facility]  Means of arrival: POV/walk-in  History obtained from: Patient  History limited by: Nothing    Sy Sanz is a 35 y.o. male who presents with diarrhea for 3 weeks.  No inciting events or new foods.  No travel.  No fevers.  He reports 6-10 episodes of watery diarrhea daily.  He says he may have lost up to 18 pounds in this time window.  Past medical history significant for HIV which she has had for years, compliant with his Biktarvy/HAART therapy.  Never had a diagnosis of AIDS.  No sick contacts.  Complains of crampy generalized nonradiating mild constant abdominal pain over the same time course.  Denies chest pain.  No cough or known close Covid contacts.  Has tried Imodium several times without relief.  Never had anything like this before.  Denies bloody output.     REVIEW OF SYSTEMS  Constitutional: Negative for fever or chills.   HENT: Negative for nosebleeds or sore throat.    Eyes: Negative for vision changes or discharge.   Respiratory: Negative for cough or shortness of breath.    Cardiovascular: Negative for chest pain or palpitations.   Gastrointestinal: Negative for nausea, vomiting, abdominal pain.   Genitourinary: Negative for dysuria or flank pain.   Musculoskeletal: Negative for back pain or joint pain.   Skin: Negative for itching or rash.   Neurological: Negative for sensory or motor changes.   Endo/Heme/Allergies: Positive for weight loss, no hives.  See HPI for further details. All other systems are negative.     PAST MEDICAL HISTORY   has a past medical history of Asthma, Chickenpox, and HIV (human immunodeficiency virus infection) ([facility]).    PAST FAMILY HISTORY  Family History   Problem Relation Age of Onset   • Sleep Apnea Mother    • Sleep Apnea Father        SOCIAL HISTORY  Social History     Tobacco Use   • Smoking status: Former Smoker      "Packs/day: 0.25     Years: 4.00     Pack years: 1.00     Types: Cigarettes   • Smokeless tobacco: Never Used   Substance and Sexual Activity   • Alcohol use: No   • Drug use: Yes     Types: Inhaled     Comment: marijuana, methamphetamine     • Sexual activity: Not on file       SURGICAL HISTORY   has a past surgical history that includes club foot release (Bilateral) and appendectomy laparoscopic (7/7/2018).    CURRENT MEDICATIONS  Home Medications     Reviewed by Valeria Rowland R.N. (Registered Nurse) on 03/09/21 at 1811  Med List Status: Not Addressed   Medication Last Dose Status   Abacavir-Dolutegravir-Lamivud (TRIUMEQ PO)  Active   benzonatate (TESSALON) 100 MG Cap  Active   Bictegravir-Emtricitab-Tenofov (BIKTARVY PO)  Active   cefdinir (OMNICEF) 300 MG Cap  Active   cefdinir (OMNICEF) 300 MG Cap  Active                ALLERGIES  Allergies   Allergen Reactions   • Morphine      Pt turned \"red\" and was put into an \"ice bath\" after receiving morphine at age 3    • Benadryl Allergy      Pt gets \"amped up, jittery and cannot sleep\"       PHYSICAL EXAM  VITAL SIGNS: /79   Pulse 96   Temp 36.3 °C (97.3 °F) (Temporal)   Resp 18   Ht 1.803 m (5' 11\")   Wt 84 kg (185 lb 3 oz)   SpO2 99%   BMI 25.83 kg/m²    Pulse ox interpretation: On room air, I interpret this pulse ox as normal.  Constitutional: Lying on the stretcher in mild distress.  HEENT: Normocephalic, atraumatic. Posterior pharynx clear, mucous membranes moist.  Eyes:  EOMI. Normal sclerae.  Neck: Supple, nontender.  Chest/Pulmonary: Clear to ausculation bilaterally, no wheezes or rhonchi.  Cardiovascular: Regular rate and rhythm, no murmur.   Abdomen: Soft, nontender; no rebound, guarding, or masses.  Back: No CVA or midline tenderness.   Musculoskeletal: No deformity or edema.  Neuro: Clear speech, normal coordination, cranial nerves II-XII grossly intact, no focal asymmetry or sensory deficits.   Psych: Normal mood and affect.  Skin: No " rashes, warm and dry.      DIAGNOSTIC STUDIES / PROCEDURES    LABS & EKG  {thisvisit}    RADIOLOGY  No orders to display       PROCEDURES  ***    COURSE & MEDICAL DECISION MAKING    This is a 35 y.o. male who presents with ***    Differential Diagnosis includes but is not limited to:  ***    ED Course:  ***    Medications - No data to display    ***emss     ***Results, exam findings, clinical impression, presumed diagnosis, treatment options, and strict return precautions were discussed with the {disc with:96886:o}, and they verbalized understanding, agreed with, and appreciated the plan of care.    ***Pertinent Labs & Imaging studies reviewed and verified by myself, as well as nursing notes and the patient's past medical, family, and social histories (See chart for details).    ***The patient is referred to a primary physician for blood pressure management, diabetic screening, and for all other preventative health concerns.     Portions of this record were made with voice recognition software.  Despite my review, spelling/grammar/context errors may still remain.  Interpretation of this chart should be taken in this context.    ***

## 2021-03-12 LAB
BACTERIA UR CULT: NORMAL
SIGNIFICANT IND 70042: NORMAL
SITE SITE: NORMAL
SOURCE SOURCE: NORMAL

## 2021-03-13 LAB — OVA AND PARASITE, FECAL INTERPRETATION Q0595: NEGATIVE

## 2021-03-16 ENCOUNTER — APPOINTMENT (OUTPATIENT)
Dept: SLEEP MEDICINE | Facility: MEDICAL CENTER | Age: 36
End: 2021-03-16
Payer: COMMERCIAL

## 2021-03-17 ENCOUNTER — APPOINTMENT (OUTPATIENT)
Dept: SLEEP MEDICINE | Facility: MEDICAL CENTER | Age: 36
End: 2021-03-17
Payer: COMMERCIAL

## 2021-06-17 ENCOUNTER — HOSPITAL ENCOUNTER (EMERGENCY)
Facility: MEDICAL CENTER | Age: 36
End: 2021-06-17
Attending: EMERGENCY MEDICINE
Payer: COMMERCIAL

## 2021-06-17 ENCOUNTER — APPOINTMENT (OUTPATIENT)
Dept: RADIOLOGY | Facility: MEDICAL CENTER | Age: 36
End: 2021-06-17
Attending: EMERGENCY MEDICINE
Payer: COMMERCIAL

## 2021-06-17 VITALS
HEART RATE: 96 BPM | HEIGHT: 71 IN | RESPIRATION RATE: 20 BRPM | WEIGHT: 175 LBS | OXYGEN SATURATION: 98 % | TEMPERATURE: 98.6 F | BODY MASS INDEX: 24.5 KG/M2 | DIASTOLIC BLOOD PRESSURE: 71 MMHG | SYSTOLIC BLOOD PRESSURE: 118 MMHG
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
## 2021-06-17 DIAGNOSIS — R07.89 CHEST WALL PAIN: ICD-10-CM

## 2021-06-17 PROCEDURE — A9270 NON-COVERED ITEM OR SERVICE: HCPCS | Performed by: EMERGENCY MEDICINE

## 2021-06-17 PROCEDURE — 307740 HCHG GREEN TRAUMA TEAM SERVICES

## 2021-06-17 PROCEDURE — 71045 X-RAY EXAM CHEST 1 VIEW: CPT

## 2021-06-17 PROCEDURE — 700102 HCHG RX REV CODE 250 W/ 637 OVERRIDE(OP): Performed by: EMERGENCY MEDICINE

## 2021-06-17 PROCEDURE — 99284 EMERGENCY DEPT VISIT MOD MDM: CPT

## 2021-06-17 RX ORDER — ZOLPIDEM TARTRATE 5 MG/1
5 TABLET ORAL NIGHTLY PRN
COMMUNITY

## 2021-06-17 RX ORDER — LORAZEPAM 1 MG/1
1 TABLET ORAL ONCE
Status: COMPLETED | OUTPATIENT
Start: 2021-06-17 | End: 2021-06-17

## 2021-06-17 RX ADMIN — LORAZEPAM 1 MG: 1 TABLET ORAL at 20:11

## 2021-06-17 ASSESSMENT — LIFESTYLE VARIABLES
TOTAL SCORE: 0
CONSUMPTION TOTAL: INCOMPLETE
DO YOU DRINK ALCOHOL: YES
TOTAL SCORE: 0
EVER HAD A DRINK FIRST THING IN THE MORNING TO STEADY YOUR NERVES TO GET RID OF A HANGOVER: NO
HAVE YOU EVER FELT YOU SHOULD CUT DOWN ON YOUR DRINKING: NO
HAVE PEOPLE ANNOYED YOU BY CRITICIZING YOUR DRINKING: NO
TOTAL SCORE: 0
EVER FELT BAD OR GUILTY ABOUT YOUR DRINKING: NO

## 2021-06-17 ASSESSMENT — FIBROSIS 4 INDEX: FIB4 SCORE: 0.42
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
## 2021-06-18 NOTE — ED NOTES
Discharge education provided. Discharge paperwork signed by pt. All questions answered. All belongings with pt. Pt ambulated to lobby unassisted.
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
## 2021-06-27 ENCOUNTER — APPOINTMENT (OUTPATIENT)
Dept: RADIOLOGY | Facility: MEDICAL CENTER | Age: 36
End: 2021-06-27
Attending: EMERGENCY MEDICINE
Payer: COMMERCIAL

## 2021-06-27 ENCOUNTER — HOSPITAL ENCOUNTER (EMERGENCY)
Facility: MEDICAL CENTER | Age: 36
End: 2021-06-27
Attending: EMERGENCY MEDICINE
Payer: COMMERCIAL

## 2021-06-27 VITALS
HEART RATE: 86 BPM | SYSTOLIC BLOOD PRESSURE: 116 MMHG | OXYGEN SATURATION: 96 % | BODY MASS INDEX: 24.5 KG/M2 | DIASTOLIC BLOOD PRESSURE: 57 MMHG | HEIGHT: 71 IN | TEMPERATURE: 96.9 F | WEIGHT: 175 LBS | RESPIRATION RATE: 16 BRPM

## 2021-06-27 DIAGNOSIS — R07.89 CHEST WALL PAIN: ICD-10-CM

## 2021-06-27 PROCEDURE — 99284 EMERGENCY DEPT VISIT MOD MDM: CPT

## 2021-06-27 PROCEDURE — 700102 HCHG RX REV CODE 250 W/ 637 OVERRIDE(OP): Performed by: EMERGENCY MEDICINE

## 2021-06-27 PROCEDURE — A9270 NON-COVERED ITEM OR SERVICE: HCPCS | Performed by: EMERGENCY MEDICINE

## 2021-06-27 PROCEDURE — 71046 X-RAY EXAM CHEST 2 VIEWS: CPT

## 2021-06-27 PROCEDURE — 700111 HCHG RX REV CODE 636 W/ 250 OVERRIDE (IP): Performed by: EMERGENCY MEDICINE

## 2021-06-27 PROCEDURE — 96372 THER/PROPH/DIAG INJ SC/IM: CPT

## 2021-06-27 RX ORDER — HYDROCODONE BITARTRATE AND ACETAMINOPHEN 5; 325 MG/1; MG/1
1 TABLET ORAL ONCE
Status: COMPLETED | OUTPATIENT
Start: 2021-06-27 | End: 2021-06-27

## 2021-06-27 RX ORDER — KETOROLAC TROMETHAMINE 30 MG/ML
30 INJECTION, SOLUTION INTRAMUSCULAR; INTRAVENOUS ONCE
Status: COMPLETED | OUTPATIENT
Start: 2021-06-27 | End: 2021-06-27

## 2021-06-27 RX ORDER — IBUPROFEN 600 MG/1
600 TABLET ORAL EVERY 6 HOURS PRN
Qty: 30 TABLET | Refills: 0 | Status: SHIPPED | OUTPATIENT
Start: 2021-06-27 | End: 2022-05-06

## 2021-06-27 RX ADMIN — HYDROCODONE BITARTRATE AND ACETAMINOPHEN 1 TABLET: 5; 325 TABLET ORAL at 04:19

## 2021-06-27 RX ADMIN — KETOROLAC TROMETHAMINE 30 MG: 60 INJECTION, SOLUTION INTRAMUSCULAR at 04:20

## 2021-06-27 ASSESSMENT — LIFESTYLE VARIABLES
HAVE PEOPLE ANNOYED YOU BY CRITICIZING YOUR DRINKING: NO
TOTAL SCORE: 0
DO YOU DRINK ALCOHOL: YES
EVER FELT BAD OR GUILTY ABOUT YOUR DRINKING: NO
HAVE YOU EVER FELT YOU SHOULD CUT DOWN ON YOUR DRINKING: NO
CONSUMPTION TOTAL: INCOMPLETE
TOTAL SCORE: 0
TOTAL SCORE: 0
EVER HAD A DRINK FIRST THING IN THE MORNING TO STEADY YOUR NERVES TO GET RID OF A HANGOVER: NO

## 2021-06-27 ASSESSMENT — ENCOUNTER SYMPTOMS
CHILLS: 0
SHORTNESS OF BREATH: 0
PALPITATIONS: 0
FEVER: 0
ABDOMINAL PAIN: 0
COUGH: 0
NAUSEA: 0
VOMITING: 0

## 2021-06-27 ASSESSMENT — PAIN DESCRIPTION - PAIN TYPE: TYPE: ACUTE PAIN

## 2021-06-27 ASSESSMENT — FIBROSIS 4 INDEX: FIB4 SCORE: 0.42
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
## 2021-06-27 NOTE — ED NOTES
Discharge education provided. Discharge paperwork signed by pt. Prescription x 1 to be picked up by pt. All questions answered. All belongings with pt. Pt ambulated to lobby unassisted.

## 2021-06-27 NOTE — LETTER
[facility], EMERGENCY DEPT   [address], [place] [address]  Phone: Dept: [phone] - Fax:        [facility] Progress Report and Disability Certification  Date of Service: 6/27/2021   No Show:  No  Date / Time of Next Visit:     Claim Information   Patient Name: Sy Sanz  Claim Number:     Employer: [employer]  Date of Injury: 6/17/2021     Insurer / TPA: Dilshad Minor ID / SSN:    Occupation:  Diagnosis: The encounter diagnosis was Chest wall pain.    Medical Information   Related to Industrial Injury? Yes ***   Subjective Complaints:  Chest wall pain, increasing with movement/use of his upper extremities   Objective Findings: Chest wall pain   Pre-Existing Condition(s): none   Assessment:   Condition Worsened    Status:    Permanent Disability:No    Plan: Medication    Diagnostics: X-ray    Comments:  CXR normal    Disability Information   Status: Released to Restricted Duty    From:     Through:   Restrictions are:     Physical Restrictions   Sitting:    Standing:    Stooping:    Bending:      Squatting:    Walking:    Climbing:    Pushing:      Pulling:    Other:    Reaching Above Shoulder (L):   Reaching Above Shoulder (R):       Reaching Below Shoulder (L):    Reaching Below Shoulder (R):      Not to exceed Weight Limits   Carrying(hrs):   Weight Limit(lb):   Lifting(hrs):   Weight  Limit(lb):     Comments: Limited lifting, reaching until follow-up with [facility] health    Repetitive Actions   Hands: i.e. Fine Manipulations from Grasping:     Feet: i.e. Operating Foot Controls:     Driving / Operate Machinery:     Physician Name: Jovanna Shine Physician Signature: JOVANNA Dang D.O. e-Signature:  , Medical Director   Clinic Name / Location: [facility], EMERGENCY DEPT  [address] [facility] [address] [phone]     Clinic Phone Number:  Dept: [phone]   Appointment Time:  Visit Start Time:    Check-In Time:  2:33 AM Visit Discharge Time:    Original-Treating Physician or Chiropractor    Page 2-Insurer/TPA    Page 3-Employer    Page 4-Employee
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
## 2021-06-27 NOTE — LETTER
[facility], EMERGENCY DEPT   [address], [place] [address]  Phone: Dept: [phone] - Fax:        [facility] Progress Report and Disability Certification  Date of Service: 6/27/2021   No Show:  No  Date / Time of Next Visit:     Claim Information   Patient Name: Sy Sanz  Claim Number:     Employer: [employer]  Date of Injury: 6/17/2021     Insurer / TPA: Dilshad Minor ID / SSN: [id number]    Occupation:  Diagnosis: The encounter diagnosis was Chest wall pain.    Medical Information   Related to Industrial Injury? Yes ***   Subjective Complaints:  Chest wall pain, increasing with movement/use of his upper extremities   Objective Findings: Chest wall pain   Pre-Existing Condition(s): none   Assessment:   Condition Worsened    Status:    Permanent Disability:No    Plan: Medication    Diagnostics: X-ray    Comments:  CXR normal    Disability Information   Status: Released to Restricted Duty    From:     Through:   Restrictions are:     Physical Restrictions   Sitting:    Standing:    Stooping:    Bending:      Squatting:    Walking:    Climbing:    Pushing:      Pulling:    Other:    Reaching Above Shoulder (L):   Reaching Above Shoulder (R):       Reaching Below Shoulder (L):    Reaching Below Shoulder (R):      Not to exceed Weight Limits   Carrying(hrs):   Weight Limit(lb):   Lifting(hrs):   Weight  Limit(lb):     Comments: Limited lifting, reaching until follow-up with [facility] health    Repetitive Actions   Hands: i.e. Fine Manipulations from Grasping:     Feet: i.e. Operating Foot Controls:     Driving / Operate Machinery:     Physician Name: Jovanna Shine Physician Signature: JOVANNA Dang D.O. e-Signature:  , Medical Director   Clinic Name / Location: [facility], EMERGENCY DEPT  [address] [facility] [address] [phone]     Clinic Phone  Number: Dept: [phone]   Appointment Time:  Visit Start Time:    Check-In Time:  2:33 AM Visit Discharge Time:    Original-Treating Physician or Chiropractor    Page 2-Insurer/TPA    Page 3-Employer    Page 4-Employee

## 2021-11-07 ENCOUNTER — HOSPITAL ENCOUNTER (EMERGENCY)
Facility: MEDICAL CENTER | Age: 36
End: 2021-11-07
Payer: COMMERCIAL

## 2021-11-07 VITALS
BODY MASS INDEX: 24.5 KG/M2 | SYSTOLIC BLOOD PRESSURE: 124 MMHG | HEART RATE: 102 BPM | OXYGEN SATURATION: 99 % | TEMPERATURE: 98 F | RESPIRATION RATE: 18 BRPM | DIASTOLIC BLOOD PRESSURE: 79 MMHG | WEIGHT: 175 LBS | HEIGHT: 71 IN

## 2021-11-07 PROCEDURE — 302449 STATCHG TRIAGE ONLY (STATISTIC)

## 2021-11-07 ASSESSMENT — FIBROSIS 4 INDEX: FIB4 SCORE: 0.42

## 2021-11-07 NOTE — ED TRIAGE NOTES
Sy Sanz  36 y.o.  male  Chief Complaint   Patient presents with   • Seizure     c/o possible seizure. No hx. seen by Neurologist with negative EEG. patient thinks he had multiple Seizure yesterday and today. + meth use.        DOWNTIME CHARTING

## 2022-01-19 ENCOUNTER — HOSPITAL ENCOUNTER (EMERGENCY)
Facility: MEDICAL CENTER | Age: 37
End: 2022-01-19
Attending: EMERGENCY MEDICINE
Payer: MEDICAID

## 2022-01-19 VITALS
HEART RATE: 104 BPM | HEIGHT: 71 IN | SYSTOLIC BLOOD PRESSURE: 132 MMHG | DIASTOLIC BLOOD PRESSURE: 81 MMHG | BODY MASS INDEX: 25.09 KG/M2 | WEIGHT: 179.23 LBS | OXYGEN SATURATION: 96 % | RESPIRATION RATE: 18 BRPM | TEMPERATURE: 99.5 F

## 2022-01-19 DIAGNOSIS — H66.91 ACUTE OTITIS MEDIA, RIGHT: ICD-10-CM

## 2022-01-19 DIAGNOSIS — L02.31 ABSCESS OF BUTTOCK, RIGHT: ICD-10-CM

## 2022-01-19 PROCEDURE — 700111 HCHG RX REV CODE 636 W/ 250 OVERRIDE (IP): Performed by: EMERGENCY MEDICINE

## 2022-01-19 PROCEDURE — 99283 EMERGENCY DEPT VISIT LOW MDM: CPT

## 2022-01-19 PROCEDURE — 700102 HCHG RX REV CODE 250 W/ 637 OVERRIDE(OP): Performed by: EMERGENCY MEDICINE

## 2022-01-19 PROCEDURE — 96372 THER/PROPH/DIAG INJ SC/IM: CPT

## 2022-01-19 PROCEDURE — A9270 NON-COVERED ITEM OR SERVICE: HCPCS | Performed by: EMERGENCY MEDICINE

## 2022-01-19 RX ORDER — OXYCODONE HYDROCHLORIDE AND ACETAMINOPHEN 5; 325 MG/1; MG/1
2 TABLET ORAL ONCE
Status: COMPLETED | OUTPATIENT
Start: 2022-01-19 | End: 2022-01-19

## 2022-01-19 RX ORDER — OXYCODONE HYDROCHLORIDE AND ACETAMINOPHEN 5; 325 MG/1; MG/1
1 TABLET ORAL EVERY 4 HOURS PRN
Qty: 8 TABLET | Refills: 0 | Status: SHIPPED | OUTPATIENT
Start: 2022-01-19 | End: 2022-01-22

## 2022-01-19 RX ORDER — SULFAMETHOXAZOLE AND TRIMETHOPRIM 800; 160 MG/1; MG/1
1 TABLET ORAL 2 TIMES DAILY
Qty: 10 TABLET | Refills: 0 | Status: SHIPPED | OUTPATIENT
Start: 2022-01-19 | End: 2022-01-24

## 2022-01-19 RX ORDER — SULFAMETHOXAZOLE AND TRIMETHOPRIM 800; 160 MG/1; MG/1
1 TABLET ORAL ONCE
Status: COMPLETED | OUTPATIENT
Start: 2022-01-19 | End: 2022-01-19

## 2022-01-19 RX ORDER — ONDANSETRON 4 MG/1
4 TABLET, ORALLY DISINTEGRATING ORAL ONCE
Status: COMPLETED | OUTPATIENT
Start: 2022-01-19 | End: 2022-01-19

## 2022-01-19 RX ORDER — DICLOFENAC POTASSIUM 25 MG/1
1 CAPSULE, LIQUID FILLED ORAL EVERY 8 HOURS PRN
Qty: 15 CAPSULE | Refills: 0 | Status: SHIPPED | OUTPATIENT
Start: 2022-01-19 | End: 2022-05-06

## 2022-01-19 RX ORDER — AMOXICILLIN 500 MG/1
500 CAPSULE ORAL 3 TIMES DAILY
Qty: 21 CAPSULE | Refills: 0 | Status: SHIPPED | OUTPATIENT
Start: 2022-01-19 | End: 2022-01-26

## 2022-01-19 RX ORDER — CEFTRIAXONE 500 MG/1
500 INJECTION, POWDER, FOR SOLUTION INTRAMUSCULAR; INTRAVENOUS ONCE
Status: COMPLETED | OUTPATIENT
Start: 2022-01-19 | End: 2022-01-19

## 2022-01-19 RX ADMIN — CEFTRIAXONE SODIUM 500 MG: 500 INJECTION, POWDER, FOR SOLUTION INTRAMUSCULAR; INTRAVENOUS at 02:02

## 2022-01-19 RX ADMIN — OXYCODONE AND ACETAMINOPHEN 2 TABLET: 5; 325 TABLET ORAL at 02:01

## 2022-01-19 RX ADMIN — ONDANSETRON 4 MG: 4 TABLET, ORALLY DISINTEGRATING ORAL at 02:02

## 2022-01-19 RX ADMIN — SULFAMETHOXAZOLE AND TRIMETHOPRIM 1 TABLET: 800; 160 TABLET ORAL at 02:01

## 2022-01-19 ASSESSMENT — FIBROSIS 4 INDEX: FIB4 SCORE: 0.42

## 2022-01-19 ASSESSMENT — PAIN DESCRIPTION - DESCRIPTORS: DESCRIPTORS: ACHING;THROBBING
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
## 2022-01-19 NOTE — ED NOTES
Patient is stable for discharge at this time, anticipatory guidance provided, close follow-up is encouraged, and ED return instructions have been detailed. Patient and family are both agreeable to the disposition and plan and discharged home in ambulatory state and in good condition.     Rx education provided, Pt verbalized understanding;    Pt stated that he has a ride home;    Narcotic paper signed;

## 2022-01-22 ENCOUNTER — HOSPITAL ENCOUNTER (EMERGENCY)
Facility: MEDICAL CENTER | Age: 37
End: 2022-01-22
Attending: EMERGENCY MEDICINE
Payer: MEDICAID

## 2022-01-22 VITALS
OXYGEN SATURATION: 98 % | HEART RATE: 92 BPM | DIASTOLIC BLOOD PRESSURE: 82 MMHG | RESPIRATION RATE: 16 BRPM | WEIGHT: 178.57 LBS | SYSTOLIC BLOOD PRESSURE: 125 MMHG | HEIGHT: 71 IN | TEMPERATURE: 97.2 F | BODY MASS INDEX: 25 KG/M2

## 2022-01-22 DIAGNOSIS — L02.91 ABSCESS: ICD-10-CM

## 2022-01-22 DIAGNOSIS — L03.90 CELLULITIS, UNSPECIFIED CELLULITIS SITE: ICD-10-CM

## 2022-01-22 PROCEDURE — 303977 HCHG I & D

## 2022-01-22 PROCEDURE — 99283 EMERGENCY DEPT VISIT LOW MDM: CPT

## 2022-01-22 PROCEDURE — A9270 NON-COVERED ITEM OR SERVICE: HCPCS | Performed by: EMERGENCY MEDICINE

## 2022-01-22 PROCEDURE — 700102 HCHG RX REV CODE 250 W/ 637 OVERRIDE(OP): Performed by: EMERGENCY MEDICINE

## 2022-01-22 PROCEDURE — 700101 HCHG RX REV CODE 250: Performed by: EMERGENCY MEDICINE

## 2022-01-22 RX ORDER — CLINDAMYCIN HYDROCHLORIDE 150 MG/1
300 CAPSULE ORAL ONCE
Status: COMPLETED | OUTPATIENT
Start: 2022-01-22 | End: 2022-01-22

## 2022-01-22 RX ORDER — LIDOCAINE HYDROCHLORIDE AND EPINEPHRINE BITARTRATE 20; .01 MG/ML; MG/ML
10 INJECTION, SOLUTION SUBCUTANEOUS ONCE
Status: COMPLETED | OUTPATIENT
Start: 2022-01-22 | End: 2022-01-22

## 2022-01-22 RX ORDER — CLINDAMYCIN HYDROCHLORIDE 300 MG/1
300 CAPSULE ORAL 3 TIMES DAILY
Qty: 30 CAPSULE | Refills: 0 | Status: SHIPPED | OUTPATIENT
Start: 2022-01-22 | End: 2022-02-01

## 2022-01-22 RX ORDER — OXYCODONE HYDROCHLORIDE AND ACETAMINOPHEN 5; 325 MG/1; MG/1
1 TABLET ORAL EVERY 4 HOURS PRN
Qty: 10 TABLET | Refills: 0 | Status: SHIPPED | OUTPATIENT
Start: 2022-01-22 | End: 2022-01-27

## 2022-01-22 RX ORDER — OXYCODONE HYDROCHLORIDE AND ACETAMINOPHEN 5; 325 MG/1; MG/1
1 TABLET ORAL ONCE
Status: COMPLETED | OUTPATIENT
Start: 2022-01-22 | End: 2022-01-22

## 2022-01-22 RX ADMIN — LIDOCAINE HYDROCHLORIDE,EPINEPHRINE BITARTRATE 10 ML: 20; .01 INJECTION, SOLUTION INFILTRATION; PERINEURAL at 14:15

## 2022-01-22 RX ADMIN — CLINDAMYCIN HYDROCHLORIDE 300 MG: 150 CAPSULE ORAL at 14:23

## 2022-01-22 RX ADMIN — OXYCODONE AND ACETAMINOPHEN 1 TABLET: 5; 325 TABLET ORAL at 14:23

## 2022-01-22 ASSESSMENT — ENCOUNTER SYMPTOMS
NAUSEA: 0
VOMITING: 0
FEVER: 0
CHILLS: 0

## 2022-01-22 ASSESSMENT — FIBROSIS 4 INDEX: FIB4 SCORE: 0.42
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
## 2022-01-22 NOTE — ED NOTES
Pt provided with discharge paper work and follow up care instructions. Pt provided with wound care instructions. Pt educated on new medications. Pt declines questions at this time. Pt ambulated out of Er.

## 2022-01-22 NOTE — ED TRIAGE NOTES
"Pt was seen in our department approximately 3 days ago, and evaluated/treated for a painful abscess on his right buttock, recurring for the previous 2 days.  He returns today as a F/U also complaining of escalating pain.   Chief Complaint   Patient presents with   • Abscess   • Spider Bite     /79   Pulse 99   Temp 36.2 °C (97.2 °F) (Temporal)   Resp 20   Ht 1.803 m (5' 11\")   Wt 81 kg (178 lb 9.2 oz)   SpO2 100%   BMI 24.91 kg/m²       Has this patient been vaccinated for COVID yes  If not, would they like to be vaccinated while in the ER if eligible?  n/a  Would the patient like to speak with the ERP about the possibility of receiving the COVID vaccine today before making a decision? n/a     "

## 2022-05-06 ENCOUNTER — OFFICE VISIT (OUTPATIENT)
Dept: URGENT CARE | Facility: CLINIC | Age: 37
End: 2022-05-06
Payer: MEDICAID

## 2022-05-06 VITALS
WEIGHT: 182 LBS | HEIGHT: 71 IN | DIASTOLIC BLOOD PRESSURE: 80 MMHG | BODY MASS INDEX: 25.48 KG/M2 | HEART RATE: 107 BPM | TEMPERATURE: 98 F | RESPIRATION RATE: 18 BRPM | SYSTOLIC BLOOD PRESSURE: 130 MMHG | OXYGEN SATURATION: 100 %

## 2022-05-06 DIAGNOSIS — R51.9 ACUTE NONINTRACTABLE HEADACHE, UNSPECIFIED HEADACHE TYPE: ICD-10-CM

## 2022-05-06 DIAGNOSIS — R20.8 ALLODYNIA: ICD-10-CM

## 2022-05-06 DIAGNOSIS — B02.9 HERPES ZOSTER WITHOUT COMPLICATION: ICD-10-CM

## 2022-05-06 PROCEDURE — 99203 OFFICE O/P NEW LOW 30 MIN: CPT | Performed by: STUDENT IN AN ORGANIZED HEALTH CARE EDUCATION/TRAINING PROGRAM

## 2022-05-06 RX ORDER — VALACYCLOVIR HYDROCHLORIDE 1 G/1
1000 TABLET, FILM COATED ORAL 3 TIMES DAILY
Qty: 21 TABLET | Refills: 0 | Status: SHIPPED | OUTPATIENT
Start: 2022-05-06 | End: 2022-05-13

## 2022-05-06 RX ORDER — IBUPROFEN 800 MG/1
800 TABLET ORAL EVERY 8 HOURS PRN
Qty: 30 TABLET | Refills: 0 | Status: SHIPPED | OUTPATIENT
Start: 2022-05-06

## 2022-05-06 ASSESSMENT — FIBROSIS 4 INDEX: FIB4 SCORE: 0.42

## 2022-05-06 NOTE — LETTER
May 6, 2022       Patient: Sy Sanz   YOB: 1985   Date of Visit: 5/6/2022         To Whom It May Concern:    Sy Sanz was seen in urgent care on 5/6/22 for his doctors appointment. He is ok to return to work.     If you have any questions or concerns, please don't hesitate to call [phone]          Sincerely,          Daryl Quach D.O.  Electronically Signed

## 2022-05-06 NOTE — PROGRESS NOTES
"Subjective:   CHIEF COMPLAINT  Chief Complaint   Patient presents with   • Pain     RASH/BLISTERS ON GROIN/ LOWER RIGHT BACK/pain/headache/x4days       HPI  Sy Sanz is a 36 y.o. male who presents with a chief complaint of a painful, blistering rash which developed along his right lower back into his thigh/groin 2 days ago.  4 days ago the patient developed body aches, headache and allodynia.  He has tried taking some OTC medications which have not helped.  Previous history of chickenpox.    REVIEW OF SYSTEMS  General: no fever or chills  GI: no nausea or vomiting  See HPI for further details.    PAST MEDICAL HISTORY  Patient Active Problem List    Diagnosis Date Noted   • Appendicitis, acute 07/07/2018   • HIV (human immunodeficiency virus infection) (HCC) 07/07/2018       SURGICAL HISTORY   has a past surgical history that includes club foot release (Bilateral) and appendectomy laparoscopic (7/7/2018).    ALLERGIES  Allergies   Allergen Reactions   • Morphine Anaphylaxis     Pt turned \"red\" and was put into an \"ice bath\" after receiving morphine at age 3    • Benadryl Allergy      Pt gets \"amped up, jittery and cannot sleep\"   • Diphenhydramine Anxiety       CURRENT MEDICATIONS  Home Medications     Reviewed by Sergio Staples Ass't (Medical Assistant) on 05/06/22 at 1032  Med List Status: <None>   Medication Last Dose Status   Abacavir-Dolutegravir-Lamivud (TRIUMEQ PO) Taking Active   ATOMOXETINE HCL PO Taking Active   benzonatate (TESSALON) 100 MG Cap  Active   Bictegravir-Emtricitab-Tenofov (BIKTARVY PO) Taking Active   cefdinir (OMNICEF) 300 MG Cap  Active   cefdinir (OMNICEF) 300 MG Cap  Active   DEXTROAMPHETAMINE SULFATE PO Taking Active   Diclofenac Potassium 25 MG Cap Not Taking Active   DIPHENOXYLATE-ATROPINE PO  Active   ibuprofen (MOTRIN) 600 MG Tab Not Taking Active   ZOLPIDEM TARTRATE PO Not Taking Active   ZONISAMIDE PO Not Taking Flagged for Removal                SOCIAL " "HISTORY  Social History     Tobacco Use   • Smoking status: Current Some Day Smoker     Packs/day: 0.25     Years: 4.00     Pack years: 1.00     Types: Cigarettes   • Smokeless tobacco: Never Used   Vaping Use   • Vaping Use: Every day   • Substances: Nicotine   Substance and Sexual Activity   • Alcohol use: No   • Drug use: Yes     Types: Inhaled     Comment: Marijuana, and methamphetamines     • Sexual activity: Not on file       FAMILY HISTORY  Family History   Problem Relation Age of Onset   • Sleep Apnea Mother    • Sleep Apnea Father           Objective:   PHYSICAL EXAM  VITAL SIGNS: /80 (BP Location: Left arm, Patient Position: Sitting)   Pulse (!) 107   Temp 36.7 °C (98 °F) (Temporal)   Resp 18   Ht 1.803 m (5' 11\")   Wt 82.6 kg (182 lb)   SpO2 100%   BMI 25.38 kg/m²     Gen: no acute distress, normal voice  Skin: grouped vesicles on an erythematous base in a dermatomal distribution at level of L1  Lungs: CTAB w/ symmetric expansion  CV: RRR w/o murmurs or clicks  Psych: normal affect, normal judgement, alert, awake    Assessment/Plan:     1. Herpes zoster without complication  valacyclovir (VALTREX) 1 GM Tab    Lidocaine 4 % Gel    ibuprofen (MOTRIN) 800 MG Tab   2. Allodynia  valacyclovir (VALTREX) 1 GM Tab   3. Acute nonintractable headache, unspecified headache type     Signs and symptoms consistent with herpes zoster infection.  - Ordered Rx for Valtrex  - Order Rx for topical lidocaine gel  - Ordered Rx for ibuprofen  -Return to urgent care any new/worsening symptoms or further questions or concerns.  Patient understood everything discussed.  All questions were answered.      Differential diagnosis, natural history, supportive care, and indications for immediate follow-up discussed. All questions answered. Patient agrees with the plan of care.    Follow-up as needed if symptoms worsen or fail to improve to PCP, Urgent care or Emergency Room.    Please note that this dictation was created " using voice recognition software. I have made a reasonable attempt to correct obvious errors, but I expect that there are errors of grammar and possibly content that I did not discover before finalizing the note.

## 2022-07-02 ENCOUNTER — APPOINTMENT (OUTPATIENT)
Dept: RADIOLOGY | Facility: MEDICAL CENTER | Age: 37
DRG: 158 | End: 2022-07-02
Attending: EMERGENCY MEDICINE
Payer: MEDICAID

## 2022-07-02 ENCOUNTER — HOSPITAL ENCOUNTER (INPATIENT)
Facility: MEDICAL CENTER | Age: 37
LOS: 2 days | DRG: 158 | End: 2022-07-04
Attending: EMERGENCY MEDICINE | Admitting: STUDENT IN AN ORGANIZED HEALTH CARE EDUCATION/TRAINING PROGRAM
Payer: MEDICAID

## 2022-07-02 DIAGNOSIS — K04.7 DENTAL INFECTION: ICD-10-CM

## 2022-07-02 DIAGNOSIS — K04.7 DENTAL ABSCESS: ICD-10-CM

## 2022-07-02 PROBLEM — F51.01 PRIMARY INSOMNIA: Status: ACTIVE | Noted: 2022-07-02

## 2022-07-02 LAB
ANION GAP SERPL CALC-SCNC: 12 MMOL/L (ref 7–16)
BASOPHILS # BLD AUTO: 0 % (ref 0–1.8)
BASOPHILS # BLD: 0 K/UL (ref 0–0.12)
BUN SERPL-MCNC: 12 MG/DL (ref 8–22)
CALCIUM SERPL-MCNC: 9.5 MG/DL (ref 8.5–10.5)
CHLORIDE SERPL-SCNC: 105 MMOL/L (ref 96–112)
CO2 SERPL-SCNC: 26 MMOL/L (ref 20–33)
CREAT SERPL-MCNC: 0.87 MG/DL (ref 0.5–1.4)
EOSINOPHIL # BLD AUTO: 0.07 K/UL (ref 0–0.51)
EOSINOPHIL NFR BLD: 0.9 % (ref 0–6.9)
ERYTHROCYTE [DISTWIDTH] IN BLOOD BY AUTOMATED COUNT: 41.8 FL (ref 35.9–50)
GFR SERPLBLD CREATININE-BSD FMLA CKD-EPI: 113 ML/MIN/1.73 M 2
GLUCOSE SERPL-MCNC: 107 MG/DL (ref 65–99)
HCT VFR BLD AUTO: 44.6 % (ref 42–52)
HGB BLD-MCNC: 15.6 G/DL (ref 14–18)
LYMPHOCYTES # BLD AUTO: 1.61 K/UL (ref 1–4.8)
LYMPHOCYTES NFR BLD: 21.8 % (ref 22–41)
MANUAL DIFF BLD: NORMAL
MCH RBC QN AUTO: 32.2 PG (ref 27–33)
MCHC RBC AUTO-ENTMCNC: 35 G/DL (ref 33.7–35.3)
MCV RBC AUTO: 92.1 FL (ref 81.4–97.8)
METAMYELOCYTES NFR BLD MANUAL: 1.7 %
MONOCYTES # BLD AUTO: 0.77 K/UL (ref 0–0.85)
MONOCYTES NFR BLD AUTO: 10.4 % (ref 0–13.4)
MORPHOLOGY BLD-IMP: NORMAL
NEUTROPHILS # BLD AUTO: 4.82 K/UL (ref 1.82–7.42)
NEUTROPHILS NFR BLD: 65.2 % (ref 44–72)
NRBC # BLD AUTO: 0 K/UL
NRBC BLD-RTO: 0 /100 WBC
PLATELET # BLD AUTO: 371 K/UL (ref 164–446)
PLATELET BLD QL SMEAR: NORMAL
PMV BLD AUTO: 8.2 FL (ref 9–12.9)
POTASSIUM SERPL-SCNC: 4.3 MMOL/L (ref 3.6–5.5)
RBC # BLD AUTO: 4.84 M/UL (ref 4.7–6.1)
RBC BLD AUTO: NORMAL
SODIUM SERPL-SCNC: 143 MMOL/L (ref 135–145)
WBC # BLD AUTO: 7.4 K/UL (ref 4.8–10.8)

## 2022-07-02 PROCEDURE — 36415 COLL VENOUS BLD VENIPUNCTURE: CPT

## 2022-07-02 PROCEDURE — 96375 TX/PRO/DX INJ NEW DRUG ADDON: CPT

## 2022-07-02 PROCEDURE — 99223 1ST HOSP IP/OBS HIGH 75: CPT | Performed by: STUDENT IN AN ORGANIZED HEALTH CARE EDUCATION/TRAINING PROGRAM

## 2022-07-02 PROCEDURE — 96367 TX/PROPH/DG ADDL SEQ IV INF: CPT

## 2022-07-02 PROCEDURE — 96365 THER/PROPH/DIAG IV INF INIT: CPT

## 2022-07-02 PROCEDURE — A9270 NON-COVERED ITEM OR SERVICE: HCPCS | Performed by: STUDENT IN AN ORGANIZED HEALTH CARE EDUCATION/TRAINING PROGRAM

## 2022-07-02 PROCEDURE — 700102 HCHG RX REV CODE 250 W/ 637 OVERRIDE(OP): Performed by: STUDENT IN AN ORGANIZED HEALTH CARE EDUCATION/TRAINING PROGRAM

## 2022-07-02 PROCEDURE — 99285 EMERGENCY DEPT VISIT HI MDM: CPT

## 2022-07-02 PROCEDURE — 86360 T CELL ABSOLUTE COUNT/RATIO: CPT

## 2022-07-02 PROCEDURE — 85007 BL SMEAR W/DIFF WBC COUNT: CPT

## 2022-07-02 PROCEDURE — 87535 HIV-1 PROBE&REVERSE TRNSCRPJ: CPT

## 2022-07-02 PROCEDURE — 770006 HCHG ROOM/CARE - MED/SURG/GYN SEMI*

## 2022-07-02 PROCEDURE — 700111 HCHG RX REV CODE 636 W/ 250 OVERRIDE (IP): Performed by: EMERGENCY MEDICINE

## 2022-07-02 PROCEDURE — 70487 CT MAXILLOFACIAL W/DYE: CPT

## 2022-07-02 PROCEDURE — 700111 HCHG RX REV CODE 636 W/ 250 OVERRIDE (IP): Performed by: STUDENT IN AN ORGANIZED HEALTH CARE EDUCATION/TRAINING PROGRAM

## 2022-07-02 PROCEDURE — 700105 HCHG RX REV CODE 258: Performed by: STUDENT IN AN ORGANIZED HEALTH CARE EDUCATION/TRAINING PROGRAM

## 2022-07-02 PROCEDURE — 85025 COMPLETE CBC W/AUTO DIFF WBC: CPT

## 2022-07-02 PROCEDURE — 87040 BLOOD CULTURE FOR BACTERIA: CPT

## 2022-07-02 PROCEDURE — 80048 BASIC METABOLIC PNL TOTAL CA: CPT

## 2022-07-02 PROCEDURE — 700105 HCHG RX REV CODE 258: Performed by: EMERGENCY MEDICINE

## 2022-07-02 PROCEDURE — 700117 HCHG RX CONTRAST REV CODE 255: Performed by: EMERGENCY MEDICINE

## 2022-07-02 PROCEDURE — 86359 T CELLS TOTAL COUNT: CPT

## 2022-07-02 RX ORDER — HYDROMORPHONE HYDROCHLORIDE 1 MG/ML
0.5 INJECTION, SOLUTION INTRAMUSCULAR; INTRAVENOUS; SUBCUTANEOUS ONCE
Status: COMPLETED | OUTPATIENT
Start: 2022-07-02 | End: 2022-07-02

## 2022-07-02 RX ORDER — ZOLPIDEM TARTRATE 5 MG/1
5 TABLET ORAL NIGHTLY
Status: DISCONTINUED | OUTPATIENT
Start: 2022-07-02 | End: 2022-07-04 | Stop reason: HOSPADM

## 2022-07-02 RX ORDER — BISACODYL 10 MG
10 SUPPOSITORY, RECTAL RECTAL
Status: DISCONTINUED | OUTPATIENT
Start: 2022-07-02 | End: 2022-07-04 | Stop reason: HOSPADM

## 2022-07-02 RX ORDER — POLYETHYLENE GLYCOL 3350 17 G/17G
1 POWDER, FOR SOLUTION ORAL
Status: DISCONTINUED | OUTPATIENT
Start: 2022-07-02 | End: 2022-07-04 | Stop reason: HOSPADM

## 2022-07-02 RX ORDER — PROCHLORPERAZINE EDISYLATE 5 MG/ML
5-10 INJECTION INTRAMUSCULAR; INTRAVENOUS EVERY 4 HOURS PRN
Status: DISCONTINUED | OUTPATIENT
Start: 2022-07-02 | End: 2022-07-04 | Stop reason: HOSPADM

## 2022-07-02 RX ORDER — AMOXICILLIN 250 MG
2 CAPSULE ORAL 2 TIMES DAILY
Status: DISCONTINUED | OUTPATIENT
Start: 2022-07-02 | End: 2022-07-04 | Stop reason: HOSPADM

## 2022-07-02 RX ORDER — AMOXICILLIN AND CLAVULANATE POTASSIUM 875; 125 MG/1; MG/1
1 TABLET, FILM COATED ORAL 2 TIMES DAILY
Status: ON HOLD | COMMUNITY
End: 2022-07-04

## 2022-07-02 RX ORDER — ONDANSETRON 2 MG/ML
4 INJECTION INTRAMUSCULAR; INTRAVENOUS EVERY 4 HOURS PRN
Status: DISCONTINUED | OUTPATIENT
Start: 2022-07-02 | End: 2022-07-04 | Stop reason: HOSPADM

## 2022-07-02 RX ORDER — ACETAMINOPHEN 500 MG
1000 TABLET ORAL 2 TIMES DAILY
COMMUNITY
End: 2023-12-05

## 2022-07-02 RX ORDER — SODIUM CHLORIDE 9 MG/ML
1000 INJECTION, SOLUTION INTRAVENOUS ONCE
Status: COMPLETED | OUTPATIENT
Start: 2022-07-02 | End: 2022-07-02

## 2022-07-02 RX ORDER — CLINDAMYCIN HYDROCHLORIDE 300 MG/1
300 CAPSULE ORAL 4 TIMES DAILY
Status: ON HOLD | COMMUNITY
End: 2022-07-04

## 2022-07-02 RX ORDER — LABETALOL HYDROCHLORIDE 5 MG/ML
10 INJECTION, SOLUTION INTRAVENOUS EVERY 4 HOURS PRN
Status: DISCONTINUED | OUTPATIENT
Start: 2022-07-02 | End: 2022-07-04 | Stop reason: HOSPADM

## 2022-07-02 RX ORDER — PROMETHAZINE HYDROCHLORIDE 25 MG/1
12.5-25 SUPPOSITORY RECTAL EVERY 4 HOURS PRN
Status: DISCONTINUED | OUTPATIENT
Start: 2022-07-02 | End: 2022-07-04 | Stop reason: HOSPADM

## 2022-07-02 RX ORDER — ONDANSETRON 2 MG/ML
4 INJECTION INTRAMUSCULAR; INTRAVENOUS ONCE
Status: COMPLETED | OUTPATIENT
Start: 2022-07-02 | End: 2022-07-02

## 2022-07-02 RX ORDER — METHOCARBAMOL 750 MG/1
750 TABLET, FILM COATED ORAL
COMMUNITY

## 2022-07-02 RX ORDER — ONDANSETRON 4 MG/1
4 TABLET, ORALLY DISINTEGRATING ORAL EVERY 4 HOURS PRN
Status: DISCONTINUED | OUTPATIENT
Start: 2022-07-02 | End: 2022-07-04 | Stop reason: HOSPADM

## 2022-07-02 RX ORDER — METHOCARBAMOL 750 MG/1
750 TABLET, FILM COATED ORAL 4 TIMES DAILY PRN
Status: DISCONTINUED | OUTPATIENT
Start: 2022-07-02 | End: 2022-07-04 | Stop reason: HOSPADM

## 2022-07-02 RX ORDER — ACETAMINOPHEN 325 MG/1
650 TABLET ORAL EVERY 6 HOURS PRN
Status: DISCONTINUED | OUTPATIENT
Start: 2022-07-02 | End: 2022-07-04 | Stop reason: HOSPADM

## 2022-07-02 RX ORDER — PROMETHAZINE HYDROCHLORIDE 25 MG/1
12.5-25 TABLET ORAL EVERY 4 HOURS PRN
Status: DISCONTINUED | OUTPATIENT
Start: 2022-07-02 | End: 2022-07-04 | Stop reason: HOSPADM

## 2022-07-02 RX ADMIN — HYDROMORPHONE HYDROCHLORIDE 0.5 MG: 1 INJECTION, SOLUTION INTRAMUSCULAR; INTRAVENOUS; SUBCUTANEOUS at 20:00

## 2022-07-02 RX ADMIN — SODIUM CHLORIDE 3 G: 900 INJECTION INTRAVENOUS at 17:30

## 2022-07-02 RX ADMIN — IOHEXOL 75 ML: 350 INJECTION, SOLUTION INTRAVENOUS at 18:00

## 2022-07-02 RX ADMIN — SODIUM CHLORIDE 1000 ML: 9 INJECTION, SOLUTION INTRAVENOUS at 18:25

## 2022-07-02 RX ADMIN — ZOLPIDEM TARTRATE 5 MG: 5 TABLET ORAL at 21:00

## 2022-07-02 RX ADMIN — PIPERACILLIN AND TAZOBACTAM 4.5 G: 4; .5 INJECTION, POWDER, LYOPHILIZED, FOR SOLUTION INTRAVENOUS; PARENTERAL at 20:00

## 2022-07-02 RX ADMIN — ONDANSETRON 4 MG: 2 INJECTION INTRAMUSCULAR; INTRAVENOUS at 20:00

## 2022-07-02 RX ADMIN — PIPERACILLIN AND TAZOBACTAM 4.5 G: 4; .5 INJECTION, POWDER, FOR SOLUTION INTRAVENOUS at 23:43

## 2022-07-02 ASSESSMENT — PATIENT HEALTH QUESTIONNAIRE - PHQ9
1. LITTLE INTEREST OR PLEASURE IN DOING THINGS: NOT AT ALL
SUM OF ALL RESPONSES TO PHQ9 QUESTIONS 1 AND 2: 0
2. FEELING DOWN, DEPRESSED, IRRITABLE, OR HOPELESS: NOT AT ALL

## 2022-07-02 ASSESSMENT — PAIN DESCRIPTION - PAIN TYPE: TYPE: ACUTE PAIN

## 2022-07-02 ASSESSMENT — FIBROSIS 4 INDEX: FIB4 SCORE: 0.42

## 2022-07-02 NOTE — ED PROVIDER NOTES
ED Provider Note    Scribed for Kiah Santizo M.D. by Itzel Singh. 7/2/2022  4:57 PM    Primary care provider: Tammi Underwood M.D.  Means of arrival: Walk in  History obtained from: Patient  History limited by: None    CHIEF COMPLAINT  Chief Complaint   Patient presents with   • Dental Pain     Abscess tooth top front teeth. Prescribed abx by . Pain has been increasing. Seen by emergency dentist who stated pt needed to have abscess drained by oral surgeon.        HPI  Sy Sanz is a 37 y.o. male who presents for evaluation of dental/facial onset eight days ago. Patient's upper teeth started hurting from a dental abscess 8 days ago and then his face started swelling up. It began on his right side and then spread to his left side of his face. It is more pronounced on his right side at present. He has right eye socket pain. He states it started to hurt in his nasal cavity and sinuses. Patient was seen at the freestanding ER in [facility] where he was given clindamycin eight days ago. He was seen by Dr. Thapa (Dentist) at [facility] in [place] two days ago and was given a numbing shot where he had a severe panic attack after his face started burning. He was referred to the ER for oral surgery evaluation and treatment by dentist.  He is almost done with a course of antibiotics. He was told he needed to be seen by an Oral Surgeon. He was seen at [place] ER for headache on 6/28. He admits to associated symptoms of chills, but denies fever. Patient cannot breathe out of one nostril.. Patient was prescribed augmentin by a dentist in [place] today through Telemedicine who recommended oral surgery and to present to ED immediately. No alleviating factors were reported. Patient is HIV positive with undetectable viral load.      Scribe remained outside the patient's room and did not have any contact with the patient for the duration of patient encounter.     REVIEW OF  SYSTEMS  Pertinent positives include: facial pain, facial swelling, eye socket pain, nasal cavity pain, sinus pain, headache,, and chills.   Pertinent negatives include no: fever.    See HPI for further details. All other systems are negative.    PAST MEDICAL HISTORY   has a past medical history of Asthma, Chickenpox, and HIV (human immunodeficiency virus infection) (HCC).    FAMILY HISTORY  Family History   Problem Relation Age of Onset   • Sleep Apnea Mother    • Sleep Apnea Father        SOCIAL HISTORY  Social History     Tobacco Use   • Smoking status: Current Some Day Smoker     Packs/day: 0.25     Years: 4.00     Pack years: 1.00     Types: Cigarettes   • Smokeless tobacco: Never Used   Vaping Use   • Vaping Use: Every day   • Substances: Nicotine   Substance Use Topics   • Alcohol use: No   • Drug use: Yes     Types: Inhaled     Comment: Marijuana, and methamphetamines        Social History     Substance and Sexual Activity   Drug Use Yes   • Types: Inhaled    Comment: Marijuana, and methamphetamines         SURGICAL HISTORY  Past Surgical History:   Procedure Laterality Date   • APPENDECTOMY LAPAROSCOPIC  7/7/2018    Procedure: APPENDECTOMY LAPAROSCOPIC;  Surgeon: Avinash Hills M.D.;  Location: SURGERY [facility];  Service: General   • CLUB FOOT RELEASE Bilateral        CURRENT MEDICATIONS  Current Outpatient Medications   Medication Instructions   • Abacavir-Dolutegravir-Lamivud (TRIUMEQ PO) 1 Tablet, Oral, DAILY   • ATOMOXETINE HCL PO Oral   • benzonatate (TESSALON) 200 mg, Oral, 3 TIMES DAILY PRN   • Bictegravir-Emtricitab-Tenofov (BIKTARVY PO) Oral   • cefdinir (OMNICEF) 300 mg, Oral, 2 TIMES DAILY   • cefdinir (OMNICEF) 300 mg, Oral, 2 TIMES DAILY   • DEXTROAMPHETAMINE SULFATE PO Oral   • DIPHENOXYLATE-ATROPINE PO Oral   • ibuprofen (MOTRIN) 800 mg, Oral, EVERY 8 HOURS PRN   • Lidocaine 4 % Gel 1 Application, Apply externally, 4 TIMES DAILY PRN   • ZOLPIDEM TARTRATE PO Take  by mouth.   •  "ZONISAMIDE PO Take  by mouth.         ALLERGIES  Allergies   Allergen Reactions   • Morphine Anaphylaxis     Pt turned \"red\" and was put into an \"ice bath\" after receiving morphine at age 3    • Benadryl Allergy      Pt gets \"amped up, jittery and cannot sleep\"   • Diphenhydramine Anxiety       PHYSICAL EXAM  VITAL SIGNS: /79   Pulse (!) 116   Temp 36.3 °C (97.4 °F) (Temporal)   Resp 18   SpO2 95%      Constitutional: Alert.  Mild distress due to pain.  HENT: There is facial swelling to the right maxillary area with loss of the right nasolabial fold.  Multiple decayed and rotted teeth throughout.  There is some gingival swelling adjacent to the front canine and front incisor.  No periorbital swelling.  Eyes: Pupils are equal and reactive. Conjunctiva normal, non-icteric.   Thorax & Lungs: Easy unlabored respirations.  Lungs are clear to auscultation without wheezes rales or rhonchi.  Heart is tachycardic but regular.  No murmurs rubs or gallops.  Skin: Visualized skin is  Dry, No erythema, No rash.   Extremities:   Full range of motion to all extremities.  Neurologic: Alert, clear speech  Psychiatric: Affect and Mood normal    LABS/RADIOLOGY/PROCEDURES  Results for orders placed or performed during the hospital encounter of 07/02/22   CBC WITH DIFFERENTIAL   Result Value Ref Range    WBC 7.4 4.8 - 10.8 K/uL    RBC 4.84 4.70 - 6.10 M/uL    Hemoglobin 15.6 14.0 - 18.0 g/dL    Hematocrit 44.6 42.0 - 52.0 %    MCV 92.1 81.4 - 97.8 fL    MCH 32.2 27.0 - 33.0 pg    MCHC 35.0 33.7 - 35.3 g/dL    RDW 41.8 35.9 - 50.0 fL    Platelet Count 371 164 - 446 K/uL    MPV 8.2 (L) 9.0 - 12.9 fL    Neutrophils-Polys 65.20 44.00 - 72.00 %    Lymphocytes 21.80 (L) 22.00 - 41.00 %    Monocytes 10.40 0.00 - 13.40 %    Eosinophils 0.90 0.00 - 6.90 %    Basophils 0.00 0.00 - 1.80 %    Nucleated RBC 0.00 /100 WBC    Neutrophils (Absolute) 4.82 1.82 - 7.42 K/uL    Lymphs (Absolute) 1.61 1.00 - 4.80 K/uL    Monos (Absolute) 0.77 0.00 " "- 0.85 K/uL    Eos (Absolute) 0.07 0.00 - 0.51 K/uL    Baso (Absolute) 0.00 0.00 - 0.12 K/uL    NRBC (Absolute) 0.00 K/uL   BASIC METABOLIC PANEL   Result Value Ref Range    Sodium 143 135 - 145 mmol/L    Potassium 4.3 3.6 - 5.5 mmol/L    Chloride 105 96 - 112 mmol/L    Co2 26 20 - 33 mmol/L    Glucose 107 (H) 65 - 99 mg/dL    Bun 12 8 - 22 mg/dL    Creatinine 0.87 0.50 - 1.40 mg/dL    Calcium 9.5 8.5 - 10.5 mg/dL    Anion Gap 12.0 7.0 - 16.0   ESTIMATED GFR   Result Value Ref Range    GFR (CKD-EPI) 113 >60 mL/min/1.73 m 2   DIFFERENTIAL MANUAL   Result Value Ref Range    Metamyelocytes 1.70 %    Manual Diff Status PERFORMED    PERIPHERAL SMEAR REVIEW   Result Value Ref Range    Peripheral Smear Review see below    PLATELET ESTIMATE   Result Value Ref Range    Plt Estimation Normal    MORPHOLOGY   Result Value Ref Range    RBC Morphology Normal          All labs reviewed by me.    CT-MAXILLOFACIAL WITH PLUS RECONS   Final Result      15 x 6 x 5 mm low density anterior to the midline maxilla which could be phlegmon or early abscess          The radiologist's interpretation of all radiological studies have been reviewed by me.      COURSE & MEDICAL DECISION MAKING  Pertinent Labs & Imaging studies reviewed. (See chart for details)    Reviewed patient's old medical records which showed that patient was seen at the free standing ER in [facility] eight days ago and was given clindamycin. He was seen at [place] on 6/28. On 6/30, patient was seen at [facility] by Dr. Thapa (Dentist) who referred patient to Dr. Persaud (Oral Surgeon) and to present to ED immediately. Dr. Thapa  Obtained a bite wing single radiograph and panoramic X-Ray. She descfribed his upper teeth as \"bombed out\" with a swollen face. Patient consulted a telemedicine Dentist in [place] today and was prescribed Augmentin. He is HIV positive with an undetectable viral load.    4:57 PM - Patient seen and examined at bedside. Discussed plan of " care, including obtaining labs and images and consulting Oral Surgeon. Patient agrees to the plan of care.     6:20 PM Paged Dr. Persaud (Oral Surgeon).     6:47 PM Re-paged Dr. Persaud.    6:52 PM I discussed the patient's case and the above findings with Dr. Persaud (Oral Surgeon) who recommended IV antibiotics and an admission to medicine.  He will see the patient in consultation.  Depending upon how patient does with the IV antibiotics, he will either take patient to the OR as inpatient or arrange for outpatient I&D and full mouth extraction.    6:57 PM Paged Hospitalist.    7:30 PM I spoke with , hospitalist on-call, and he will kindly evaluate the patient for hospitalization.    Patient presents to the ER complaining of persistent facial swelling and dental pain despite a week of clindamycin.  The patient has been to a freestanding ER, the emergency department at [place], as well as 2 dentists who states patient needs oral surgery evaluation.  Patient saw a local dentist 2 days ago who told him he needed to come to ER for definitive care with oral surgery.  He also saw a telemetry dentist today who told him he needed to come to ER.  Patient does present here today.  He is tachycardic but is not hypotensive or febrile at this time.  White count is normal.  Tachycardia has improved with IV fluids.  He was given dose of IV Unasyn here in the ER.  CT scan does reveal a 15 x 6 mm abscess in the right maxillary area.  I spoke with oral surgeon on-call.  He would like the patient admitted to the medicine service for IV antibiotics.  He will consult and then decide if patient needs to go to the OR as an inpatient, or if he responds to IV antibiotics, he may be a suitable outpatient surgical candidate.  However, he does request that patient be on IV antibiotics for at least 24 hours to see how he does in the meantime.  I spoke with hospitalist on-call and he will kindly evaluate the patient  hospitalization.    DISPOSITION:  Patient will be hospitalized by Dr. Rios in guarded condition.    FINAL IMPRESSION  1. Dental abscess Acute         Itzel WISE (Scribe), am scribing for, and in the presence of, Kiah Santizo M.D..    Electronically signed by: Itzel Singh (Scribe), 7/2/2022    Kiah WISE M.D. personally performed the services described in this documentation, as scribed by Itzel Singh in my presence, and it is both accurate and complete.    This dictation has been created using voice recognition software. The accuracy of the dictation is limited by the abilities of the software. I expect there may be some errors of grammar and possibly content. I made every attempt to manually correct the errors within my dictation. However, errors related to voice recognition software may still exist and should be interpreted within the appropriate context.    The note accurately reflects work and decisions made by me.  Kiah Santizo M.D.  7/2/2022  7:31 PM

## 2022-07-02 NOTE — ED TRIAGE NOTES
Chief Complaint   Patient presents with   • Dental Pain     Abscess tooth top front teeth. Prescribed abx by . Pain has been increasing. Seen by emergency dentist who stated pt needed to have abscess drained by oral surgeon.        /79   Pulse (!) 130   Temp 36.3 °C (97.4 °F) (Temporal)   Resp 18   SpO2 99%

## 2022-07-03 LAB
ANION GAP SERPL CALC-SCNC: 11 MMOL/L (ref 7–16)
BASOPHILS # BLD AUTO: 1.4 % (ref 0–1.8)
BASOPHILS # BLD: 0.09 K/UL (ref 0–0.12)
BUN SERPL-MCNC: 16 MG/DL (ref 8–22)
CALCIUM SERPL-MCNC: 8.6 MG/DL (ref 8.5–10.5)
CHLORIDE SERPL-SCNC: 98 MMOL/L (ref 96–112)
CO2 SERPL-SCNC: 25 MMOL/L (ref 20–33)
CREAT SERPL-MCNC: 1.09 MG/DL (ref 0.5–1.4)
EOSINOPHIL # BLD AUTO: 0.19 K/UL (ref 0–0.51)
EOSINOPHIL NFR BLD: 2.9 % (ref 0–6.9)
ERYTHROCYTE [DISTWIDTH] IN BLOOD BY AUTOMATED COUNT: 40.7 FL (ref 35.9–50)
GFR SERPLBLD CREATININE-BSD FMLA CKD-EPI: 89 ML/MIN/1.73 M 2
GLUCOSE SERPL-MCNC: 84 MG/DL (ref 65–99)
HCT VFR BLD AUTO: 41.4 % (ref 42–52)
HGB BLD-MCNC: 14.6 G/DL (ref 14–18)
IMM GRANULOCYTES # BLD AUTO: 0.24 K/UL (ref 0–0.11)
IMM GRANULOCYTES NFR BLD AUTO: 3.7 % (ref 0–0.9)
LYMPHOCYTES # BLD AUTO: 1.9 K/UL (ref 1–4.8)
LYMPHOCYTES NFR BLD: 29 % (ref 22–41)
MCH RBC QN AUTO: 32.6 PG (ref 27–33)
MCHC RBC AUTO-ENTMCNC: 35.3 G/DL (ref 33.7–35.3)
MCV RBC AUTO: 92.4 FL (ref 81.4–97.8)
MONOCYTES # BLD AUTO: 0.53 K/UL (ref 0–0.85)
MONOCYTES NFR BLD AUTO: 8.1 % (ref 0–13.4)
NEUTROPHILS # BLD AUTO: 3.6 K/UL (ref 1.82–7.42)
NEUTROPHILS NFR BLD: 54.9 % (ref 44–72)
NRBC # BLD AUTO: 0 K/UL
NRBC BLD-RTO: 0 /100 WBC
PLATELET # BLD AUTO: 350 K/UL (ref 164–446)
PMV BLD AUTO: 7.9 FL (ref 9–12.9)
POTASSIUM SERPL-SCNC: 4.5 MMOL/L (ref 3.6–5.5)
RBC # BLD AUTO: 4.48 M/UL (ref 4.7–6.1)
SODIUM SERPL-SCNC: 134 MMOL/L (ref 135–145)
WBC # BLD AUTO: 6.6 K/UL (ref 4.8–10.8)

## 2022-07-03 PROCEDURE — 87040 BLOOD CULTURE FOR BACTERIA: CPT

## 2022-07-03 PROCEDURE — 700105 HCHG RX REV CODE 258: Performed by: STUDENT IN AN ORGANIZED HEALTH CARE EDUCATION/TRAINING PROGRAM

## 2022-07-03 PROCEDURE — 700111 HCHG RX REV CODE 636 W/ 250 OVERRIDE (IP): Performed by: STUDENT IN AN ORGANIZED HEALTH CARE EDUCATION/TRAINING PROGRAM

## 2022-07-03 PROCEDURE — 85025 COMPLETE CBC W/AUTO DIFF WBC: CPT

## 2022-07-03 PROCEDURE — A9270 NON-COVERED ITEM OR SERVICE: HCPCS | Performed by: STUDENT IN AN ORGANIZED HEALTH CARE EDUCATION/TRAINING PROGRAM

## 2022-07-03 PROCEDURE — 36415 COLL VENOUS BLD VENIPUNCTURE: CPT

## 2022-07-03 PROCEDURE — 80048 BASIC METABOLIC PNL TOTAL CA: CPT

## 2022-07-03 PROCEDURE — 700102 HCHG RX REV CODE 250 W/ 637 OVERRIDE(OP): Performed by: STUDENT IN AN ORGANIZED HEALTH CARE EDUCATION/TRAINING PROGRAM

## 2022-07-03 PROCEDURE — 99232 SBSQ HOSP IP/OBS MODERATE 35: CPT | Performed by: STUDENT IN AN ORGANIZED HEALTH CARE EDUCATION/TRAINING PROGRAM

## 2022-07-03 PROCEDURE — 770006 HCHG ROOM/CARE - MED/SURG/GYN SEMI*

## 2022-07-03 RX ORDER — CHOLECALCIFEROL (VITAMIN D3) 125 MCG
5 CAPSULE ORAL NIGHTLY
Status: DISCONTINUED | OUTPATIENT
Start: 2022-07-03 | End: 2022-07-03

## 2022-07-03 RX ORDER — LORAZEPAM 1 MG/1
1 TABLET ORAL ONCE
Status: COMPLETED | OUTPATIENT
Start: 2022-07-03 | End: 2022-07-03

## 2022-07-03 RX ORDER — KETOROLAC TROMETHAMINE 30 MG/ML
15 INJECTION, SOLUTION INTRAMUSCULAR; INTRAVENOUS EVERY 6 HOURS PRN
Status: DISCONTINUED | OUTPATIENT
Start: 2022-07-03 | End: 2022-07-04 | Stop reason: HOSPADM

## 2022-07-03 RX ORDER — OXYCODONE HYDROCHLORIDE 5 MG/1
5 TABLET ORAL EVERY 4 HOURS PRN
Status: DISCONTINUED | OUTPATIENT
Start: 2022-07-03 | End: 2022-07-04 | Stop reason: HOSPADM

## 2022-07-03 RX ORDER — HYDROXYZINE HYDROCHLORIDE 10 MG/1
10 TABLET, FILM COATED ORAL 3 TIMES DAILY PRN
Status: DISCONTINUED | OUTPATIENT
Start: 2022-07-03 | End: 2022-07-04 | Stop reason: HOSPADM

## 2022-07-03 RX ADMIN — KETOROLAC TROMETHAMINE 15 MG: 30 INJECTION, SOLUTION INTRAMUSCULAR at 09:35

## 2022-07-03 RX ADMIN — OXYCODONE 5 MG: 5 TABLET ORAL at 17:22

## 2022-07-03 RX ADMIN — PIPERACILLIN AND TAZOBACTAM 4.5 G: 4; .5 INJECTION, POWDER, FOR SOLUTION INTRAVENOUS at 13:12

## 2022-07-03 RX ADMIN — KETOROLAC TROMETHAMINE 15 MG: 30 INJECTION, SOLUTION INTRAMUSCULAR at 21:01

## 2022-07-03 RX ADMIN — BICTEGRAVIR SODIUM, EMTRICITABINE, AND TENOFOVIR ALAFENAMIDE FUMARATE 1 TABLET: 50; 200; 25 TABLET ORAL at 08:38

## 2022-07-03 RX ADMIN — HYDROXYZINE HYDROCHLORIDE 10 MG: 10 TABLET ORAL at 21:01

## 2022-07-03 RX ADMIN — ACETAMINOPHEN 650 MG: 325 TABLET ORAL at 02:47

## 2022-07-03 RX ADMIN — KETOROLAC TROMETHAMINE 15 MG: 30 INJECTION, SOLUTION INTRAMUSCULAR at 15:38

## 2022-07-03 RX ADMIN — HYDROXYZINE HYDROCHLORIDE 10 MG: 10 TABLET ORAL at 14:37

## 2022-07-03 RX ADMIN — METHOCARBAMOL 750 MG: 750 TABLET ORAL at 02:47

## 2022-07-03 RX ADMIN — LORAZEPAM 1 MG: 1 TABLET ORAL at 03:23

## 2022-07-03 RX ADMIN — OXYCODONE 5 MG: 5 TABLET ORAL at 13:11

## 2022-07-03 RX ADMIN — PIPERACILLIN AND TAZOBACTAM 4.5 G: 4; .5 INJECTION, POWDER, FOR SOLUTION INTRAVENOUS at 05:38

## 2022-07-03 RX ADMIN — ZOLPIDEM TARTRATE 5 MG: 5 TABLET ORAL at 21:01

## 2022-07-03 RX ADMIN — SODIUM CHLORIDE 3 G: 900 INJECTION INTRAVENOUS at 17:23

## 2022-07-03 RX ADMIN — KETOROLAC TROMETHAMINE 15 MG: 30 INJECTION, SOLUTION INTRAMUSCULAR at 03:23

## 2022-07-03 ASSESSMENT — ENCOUNTER SYMPTOMS
PALPITATIONS: 0
SENSORY CHANGE: 0
NERVOUS/ANXIOUS: 0
FOCAL WEAKNESS: 0
NAUSEA: 0
DOUBLE VISION: 0
MYALGIAS: 0
ABDOMINAL PAIN: 0
DIARRHEA: 0
BLURRED VISION: 0
FEVER: 0
COUGH: 0
CHILLS: 0
EYE PAIN: 0
FALLS: 0
HEADACHES: 1
SHORTNESS OF BREATH: 0
CHILLS: 1
SPEECH CHANGE: 0
VOMITING: 0

## 2022-07-03 ASSESSMENT — PAIN DESCRIPTION - PAIN TYPE
TYPE: ACUTE PAIN
TYPE: ACUTE PAIN

## 2022-07-03 ASSESSMENT — LIFESTYLE VARIABLES: SUBSTANCE_ABUSE: 0

## 2022-07-03 NOTE — CONSULTS
omfs  Consult  [facility]    ID: Sy Sanz is a 37 y.o. male who presented to the ER for facial swelling and dental infection     PMH:   Past Medical History:   Diagnosis Date   • Asthma    • Chickenpox    • HIV (human immunodeficiency virus infection) (Formerly [facility])      Medications:   Current Facility-Administered Medications   Medication Dose Route Frequency Provider Last Rate Last Admin   • ketorolac (TORADOL) injection 15 mg  15 mg Intravenous Q6HRS PRN Kelvin Rios M.D.   15 mg at 07/03/22 0935   • oxyCODONE immediate-release (ROXICODONE) tablet 5 mg  5 mg Oral Q4HRS PRN Elida Escobar M.D.       • piperacillin-tazobactam (ZOSYN) 4.5 g in  mL IVPB  4.5 g Intravenous Q8HRS Kelvin Rios M.D.   Stopped at 07/03/22 0938   • bictegravir-emtricitab-TAF (Biktarvy) -25 mg tablet 1 Tablet  1 Tablet Oral DAILY Kelvin Rios M.D.   1 Tablet at 07/03/22 0838   • methocarbamol (ROBAXIN) tablet 750 mg  750 mg Oral 4X/DAY PRN Kelvin Rios M.D.   750 mg at 07/03/22 0247   • zolpidem (AMBIEN) tablet 5 mg  5 mg Oral Nightly Kelvin Rios M.D.   5 mg at 07/02/22 2100   • senna-docusate (PERICOLACE or SENOKOT S) 8.6-50 MG per tablet 2 Tablet  2 Tablet Oral BID Kelvin Rios M.D.        And   • polyethylene glycol/lytes (MIRALAX) PACKET 1 Packet  1 Packet Oral QDAY PRN Kelvin Rios M.D.        And   • magnesium hydroxide (MILK OF MAGNESIA) suspension 30 mL  30 mL Oral QDAY PRN Kelvin Rios M.D.        And   • bisacodyl (DULCOLAX) suppository 10 mg  10 mg Rectal QDAY PRN Kelvin Rios M.D.       • acetaminophen (Tylenol) tablet 650 mg  650 mg Oral Q6HRS PRN Kelvin Rios M.D.   650 mg at 07/03/22 0247   • labetalol (NORMODYNE/TRANDATE) injection 10 mg  10 mg Intravenous Q4HRS PRN Kelvin Rios M.D.       • ondansetron (ZOFRAN) syringe/vial injection 4 mg  4 mg Intravenous Q4HRS PRN Kelvin Rios M.D.       • ondansetron (ZOFRAN  "ODT) dispertab 4 mg  4 mg Oral Q4HRS PRN Kelvin Rios M.D.       • promethazine (PHENERGAN) tablet 12.5-25 mg  12.5-25 mg Oral Q4HRS PRN Kelvin Rios M.D.       • promethazine (PHENERGAN) suppository 12.5-25 mg  12.5-25 mg Rectal Q4HRS PRN Kelvin Rios M.D.       • prochlorperazine (COMPAZINE) injection 5-10 mg  5-10 mg Intravenous Q4HRS PRN Kelvin Rios M.D.         Allergies:   Allergies   Allergen Reactions   • Morphine Anaphylaxis     Pt turned \"red\" and was put into an \"ice bath\" after receiving morphine at age 3    • Benadryl Allergy      Pt gets \"amped up, jittery and cannot sleep\"   • Diphenhydramine Anxiety     Surgical history:   Past Surgical History:   Procedure Laterality Date   • APPENDECTOMY LAPAROSCOPIC  7/7/2018    Procedure: APPENDECTOMY LAPAROSCOPIC;  Surgeon: Avinash Hills M.D.;  Location: SURGERY [facility];  Service: General   • CLUB FOOT RELEASE Bilateral      Social history:   Social History     Social History Narrative   • Not on file     Family history:   Family History   Problem Relation Age of Onset   • Sleep Apnea Mother    • Sleep Apnea Father        ROS: All systems reviewed and are negative other than those noted in HPI and PMH.    Vitals:  Vitals:    07/03/22 0702   BP: 111/58   Pulse: 94   Resp: 16   Temp: 36.5 °C (97.7 °F)   SpO2: 96%       Labs:  Recent Labs     07/02/22  1724 07/03/22  0557   WBC 7.4 6.6   RBC 4.84 4.48*   HEMOGLOBIN 15.6 14.6   HEMATOCRIT 44.6 41.4*   MCV 92.1 92.4   MCH 32.2 32.6   RDW 41.8 40.7   PLATELETCT 371 350   MPV 8.2* 7.9*   NEUTSPOLYS 65.20 54.90   LYMPHOCYTES 21.80* 29.00   MONOCYTES 10.40 8.10   EOSINOPHILS 0.90 2.90   BASOPHILS 0.00 1.40   RBCMORPHOLO Normal  --      Recent Labs     07/02/22  1724 07/03/22  0557   SODIUM 143 134*   POTASSIUM 4.3 4.5   CHLORIDE 105 98   CO2 26 25   GLUCOSE 107* 84   BUN 12 16         Imaging: Independent review of ct maxillofacial   with the following findings: maxillary odontogenic " abscess     Assessment: 37 y.o. male with maxillary odontogenic infection - failed outpatient antibiotic treatment     Plan:  1 patient admitted for IV ABx - continue antibiotics once failed outpatient abx therapy.  Patient does need the teeth removed but does not need to be taken to OR urgently. With improvement on antibiotics patient can follow up with my office upon discharge.      Call/text [phone]     Cori GANDHI MD  [facility]

## 2022-07-03 NOTE — H&P
Hospital Medicine History & Physical Note    Date of Service  7/2/2022    Primary Care Physician  Tammi Underwood M.D.    [place]  [facility] [place]    Code Status  Full Code    Chief Complaint  Chief Complaint   Patient presents with   • Dental Pain     Abscess tooth top front teeth. Prescribed abx by . Pain has been increasing. Seen by emergency dentist who stated pt needed to have abscess drained by oral surgeon.        History of Presenting Illness  Sy Sanz is a 37 y.o. male with HIV controlled with Biktarvy, who presented 7/2/2022 with dental infection failed outpatient antibiotics.  Very days prior to presentation patient had swelling started on the right side of his face that eventually progressed to the left side, facial and dental pain, eye socket pain as well as nasal cavity and sinus pain.  Went to a freestanding renal was given clindamycin which she has been taking for 8 days.  2 days prior to presentation he was seen by Dr. Thapa was given a numbing shot but had a severe panic attack and he was referred to the ER.  He was told he needed to see an oral surgeon.  He went to [place] for headache on 6/28 and reported improvement of his swelling, he deferred CT at that time and was discharged and told to follow-up with a dentist.  He had a telemedicine appointment with a dentist who recommended oral surgery and present immediately to the ED which he did.  He has had no fever but has had chills, denies any cough or chest pain, nausea vomiting abdominal pain dysuria or diarrhea.    In the ED he was afebrile, pulse 105-130 normal respiratory rate and room air oxygen saturation, systolic blood pressure in the 120s.  Initial work-up with unremarkable CBC, BMP glucose 107 otherwise normal, CT maxillofacial with contrast shows 15 x 6.5 mm low density anterior to the midline maxilla which could be phlegmon or early abscess.  ERP spoke with [place]. [place]aud [place] [facility] [place] be  "admitted for IV antibiotics and close observation, possible trip to the OR for I&D depending on response to IV antibiotics.  Patient subsequently for the hospitalist for admission.    I discussed the plan of care with patient, bedside RN and pharmacy.    Review of Systems  Review of Systems   Constitutional: Positive for chills. Negative for fever.   HENT: Negative for congestion and nosebleeds.    Eyes: Negative for blurred vision and double vision.   Respiratory: Negative for cough and shortness of breath.    Cardiovascular: Negative for chest pain and palpitations.   Gastrointestinal: Negative for abdominal pain, diarrhea, nausea and vomiting.   Genitourinary: Negative for dysuria and urgency.   Musculoskeletal: Negative for falls and myalgias.   Neurological: Negative for sensory change, speech change and focal weakness.   Psychiatric/Behavioral: Negative for substance abuse. The patient is not nervous/anxious.        Past Medical History   has a past medical history of Asthma, Chickenpox, and HIV (human immunodeficiency virus infection) (HCC).    Surgical History   has a past surgical history that includes club foot release (Bilateral) and appendectomy laparoscopic (7/7/2018).     Family History  family history includes Sleep Apnea in his father and mother.       Social History   reports that he has been smoking cigarettes. He has a 1.00 pack-year smoking history. He has never used smokeless tobacco. He reports current drug use. Drug: Inhaled. He reports that he does not drink alcohol.    Allergies  Allergies   Allergen Reactions   • Morphine Anaphylaxis     Pt turned \"red\" and was put into an \"ice bath\" after receiving morphine at age 3    • Benadryl Allergy      Pt gets \"amped up, jittery and cannot sleep\"   • Diphenhydramine Anxiety       Medications  Prior to Admission Medications   Prescriptions Last Dose Informant Patient Reported? Taking?   Bictegravir-Emtricitab-Tenofov (BIKTARVY PO) 7/2/2022 at AM " Patient Yes No   Sig: Take 1 Tablet by mouth every day.   DEXTROAMPHETAMINE SULFATE PO > 1 week at Unknown Patient Yes No   Sig: Take  by mouth.   ZONISAMIDE PO  Patient Yes No   Sig: Take  by mouth.   Patient not taking: No sig reported   acetaminophen (TYLENOL) 500 MG Tab 7/1/2022 at PM Patient Yes Yes   Sig: Take 1,000 mg by mouth 2 times a day.   amoxicillin-clavulanate (AUGMENTIN) 875-125 MG Tab 7/2/2022 at 15:30 Patient Yes Yes   Sig: Take 1 Tablet by mouth 2 times a day.   clindamycin (CLEOCIN) 300 MG Cap 7/2/2022 at 12:30 Patient Yes Yes   Sig: Take 300 mg by mouth 4 times a day.   ibuprofen (MOTRIN) 800 MG Tab 7/2/2022 at AM Patient No No   Sig: Take 1 Tablet by mouth every 8 hours as needed for Moderate Pain.   methocarbamol (ROBAXIN) 750 MG Tab 7/2/2022 at AM  Yes Yes   Sig: Take 750 mg by mouth 4 times a day as needed (Muscle Pain).   zolpidem (AMBIEN) 5 MG Tab 7/2/2022 at 12:00 Patient Yes No   Sig: Take 5 mg by mouth every evening.      Facility-Administered Medications: None       Physical Exam  Temp:  [36.3 °C (97.4 °F)] 36.3 °C (97.4 °F)  Pulse:  [105-130] 110  Resp:  [18] 18  BP: (120-123)/(75-79) 123/79  SpO2:  [95 %-99 %] 98 %  Blood Pressure: 123/79   Temperature: 36.3 °C (97.4 °F)   Pulse: (!) 110   Respiration: 18   Pulse Oximetry: 98 %       Physical Exam  Vitals and nursing note reviewed.   Constitutional:       General: He is not in acute distress.     Appearance: He is not toxic-appearing.   HENT:      Head: Normocephalic and atraumatic.      Comments: facial swelling to the right maxillary area, decayed and rotted teeth throughout, gingival swelling adjacent to front canine and incisor.  No periorbital swelling     Nose: Nose normal. No rhinorrhea.      Comments: Right-sided facial swelling     Mouth/Throat:      Mouth: Mucous membranes are moist.      Pharynx: Oropharynx is clear.   Eyes:      General: No scleral icterus.     Extraocular Movements: Extraocular movements intact.       Conjunctiva/sclera: Conjunctivae normal.      Pupils: Pupils are equal, round, and reactive to light.   Cardiovascular:      Rate and Rhythm: Normal rate and regular rhythm.      Pulses: Normal pulses.      Heart sounds: No murmur heard.  Pulmonary:      Effort: Pulmonary effort is normal. No respiratory distress.      Breath sounds: Normal breath sounds. No wheezing, rhonchi or rales.   Abdominal:      General: Bowel sounds are normal.      Palpations: Abdomen is soft.      Tenderness: There is no abdominal tenderness. There is no guarding or rebound.   Musculoskeletal:         General: No tenderness or deformity. Normal range of motion.      Cervical back: Normal range of motion and neck supple. No rigidity or tenderness.      Right lower leg: No edema.      Left lower leg: No edema.   Skin:     General: Skin is warm and dry.      Capillary Refill: Capillary refill takes less than 2 seconds.   Neurological:      General: No focal deficit present.      Mental Status: He is alert and oriented to person, place, and time. Mental status is at baseline.      Cranial Nerves: No cranial nerve deficit.      Sensory: No sensory deficit.      Motor: No weakness.      Coordination: Coordination normal.   Psychiatric:         Mood and Affect: Mood normal.         Behavior: Behavior normal.         Thought Content: Thought content normal.         Judgment: Judgment normal.         Laboratory:  Recent Labs     07/02/22  1724   WBC 7.4   RBC 4.84   HEMOGLOBIN 15.6   HEMATOCRIT 44.6   MCV 92.1   MCH 32.2   MCHC 35.0   RDW 41.8   PLATELETCT 371   MPV 8.2*     Recent Labs     07/02/22  1724   SODIUM 143   POTASSIUM 4.3   CHLORIDE 105   CO2 26   GLUCOSE 107*   BUN 12   CREATININE 0.87   CALCIUM 9.5     Recent Labs     07/02/22  1724   GLUCOSE 107*         No results for input(s): NTPROBNP in the last 72 hours.      No results for input(s): TROPONINT in the last 72 hours.    Imaging:  CT-MAXILLOFACIAL WITH PLUS RECONS   Final Result       15 x 6 x 5 mm low density anterior to the midline maxilla which could be phlegmon or early abscess          no X-Ray or EKG requiring interpretation    Assessment/Plan:  Justification for Admission Status  I anticipate this patient will require at least two midnights for appropriate medical management, necessitating inpatient admission because Patient to be observed for at least 24 hours on IV antibiotics with possible need for I&D in the OR with OMFS.    Primary insomnia- (present on admission)  Assessment & Plan  Continue home dose Ambien    Dental infection- (present on admission)  Assessment & Plan  15 x 6 x 5 mm low-density anterior to midline maxilla could be phlegmon or early abscess.  [place] [facility] [place] recommends admission with IV antibiotics and monitoring need for surgery.  On Zosyn  Check blood cultures    HIV (human immunodeficiency virus infection) (HCC)- (present on admission)  Assessment & Plan  Controlled with Biktarvy  Continue Biktarvy  With dental infection failed outpatient oral antibiotics, check viral load and CD4 count      VTE prophylaxis: SCDs/TEDs

## 2022-07-03 NOTE — CARE PLAN
Problem: Knowledge Deficit - Standard  Goal: Patient and family/care givers will demonstrate understanding of plan of care, disease process/condition, diagnostic tests and medications  Outcome: Progressing  Note: Discussed POC and medications with patient.  Patient verbalized understanding.     The patient is Stable - Low risk of patient condition declining or worsening    Shift Goals  Clinical Goals: Dental surgry  Patient Goals: Pain mgnt

## 2022-07-03 NOTE — CARE PLAN
Problem: Knowledge Deficit - Standard  Goal: Patient and family/care givers will demonstrate understanding of plan of care, disease process/condition, diagnostic tests and medications  Outcome: Progressing     Problem: Pain - Standard  Goal: Alleviation of pain or a reduction in pain to the patient’s comfort goal  Outcome: Progressing   The patient is Stable - Low risk of patient condition declining or worsening    Shift Goals  Clinical Goals: pain control, antibiotics  Patient Goals: pain control, antibiotics    Progress made toward(s) clinical / shift goals:  yes    Patient is not progressing towards the following goals:

## 2022-07-03 NOTE — ED NOTES
Med rec completed per patient at bedside. Patient stated to be taking 1,600mg of Ibuprofen BID.     Allergies reviewed    Patient has Clindamycin and Augmentin he is currently taking    Home pharmacy is [facility] [address]

## 2022-07-03 NOTE — ASSESSMENT & PLAN NOTE
15 x 6 x 5 mm low-density anterior to midline maxilla could be phlegmon or early abscess.    Dr. Persaud from [facility] [place] saw the patient today, no urgent procedure needed.  Need to follow-up as outpatient for teeth removal.     Afebrile, normal WBC, de-escalate Zosyn to Unasyn

## 2022-07-03 NOTE — PROGRESS NOTES
Hospital Medicine Daily Progress Note    Date of Service  7/3/2022    Chief Complaint  Sy Sanz is a 37 y.o. male admitted 7/2/2022 with dental pain    Hospital Course  Sy Sanz is a 37 y.o. male with HIV controlled with Biktarvy, who presented 7/2/2022 with dental infection failed outpatient antibiotics.  Patient took clindamycin for a week and Augmentin for 2 days.      CT maxillofacial with contrast showed 15 x 6 x 5 mm low-density anterior to midline maxilla could be phlegmon or early abscess.    Interval Problem Update  Patient reported that the pain is better.     Oral surgeon saw the patient today, no urgent procedure needed.  May follow-up as outpatient for teeth removal.     Afebrile, normal WBC, de-escalate Zosyn to Unasyn       I have discussed this patient's plan of care and discharge plan at IDT rounds today with Case Management, Nursing, Nursing leadership, and other members of the IDT team.    Consultants/Specialty  Oral surgeon    Code Status  Full Code    Disposition  Patient is not medically cleared for discharge.   Anticipate discharge to to home with close outpatient follow-up.  I have placed the appropriate orders for post-discharge needs.    Review of Systems  Review of Systems   Constitutional: Negative for chills and fever.   HENT: Negative for ear pain.         Tooth pain and headache   Eyes: Negative for pain.   Respiratory: Negative for shortness of breath.    Cardiovascular: Negative for chest pain and leg swelling.   Gastrointestinal: Negative for abdominal pain, diarrhea, nausea and vomiting.   Genitourinary: Negative for dysuria, frequency and urgency.   Musculoskeletal: Negative for myalgias.   Neurological: Positive for headaches.        Physical Exam  Temp:  [36.3 °C (97.4 °F)-36.6 °C (97.8 °F)] 36.5 °C (97.7 °F)  Pulse:  [] 94  Resp:  [16-18] 16  BP: (111-140)/(58-91) 111/58  SpO2:  [95 %-99 %] 96 %    Physical Exam  Constitutional:        Appearance: Normal appearance.   HENT:      Head: Normocephalic.      Comments: Multiple decayed and eroded teeth.  Gingival swelling     Nose: Nose normal.      Mouth/Throat:      Mouth: Mucous membranes are moist.   Eyes:      Extraocular Movements: Extraocular movements intact.      Conjunctiva/sclera: Conjunctivae normal.   Cardiovascular:      Rate and Rhythm: Normal rate and regular rhythm.      Pulses: Normal pulses.      Heart sounds: Normal heart sounds.   Pulmonary:      Effort: Pulmonary effort is normal.      Breath sounds: Normal breath sounds. No wheezing or rales.   Abdominal:      General: Bowel sounds are normal.      Palpations: Abdomen is soft.      Tenderness: There is no abdominal tenderness. There is no guarding.   Musculoskeletal:         General: Normal range of motion.      Cervical back: Normal range of motion and neck supple.   Skin:     General: Skin is warm.   Neurological:      Mental Status: He is alert and oriented to person, place, and time. Mental status is at baseline.   Psychiatric:         Mood and Affect: Mood normal.         Fluids    Intake/Output Summary (Last 24 hours) at 7/3/2022 1519  Last data filed at 7/3/2022 0800  Gross per 24 hour   Intake 1220 ml   Output --   Net 1220 ml       Laboratory  Recent Labs     07/02/22  1724 07/03/22  0557   WBC 7.4 6.6   RBC 4.84 4.48*   HEMOGLOBIN 15.6 14.6   HEMATOCRIT 44.6 41.4*   MCV 92.1 92.4   MCH 32.2 32.6   MCHC 35.0 35.3   RDW 41.8 40.7   PLATELETCT 371 350   MPV 8.2* 7.9*     Recent Labs     07/02/22  1724 07/03/22  0557   SODIUM 143 134*   POTASSIUM 4.3 4.5   CHLORIDE 105 98   CO2 26 25   GLUCOSE 107* 84   BUN 12 16   CREATININE 0.87 1.09   CALCIUM 9.5 8.6                   Imaging  CT-MAXILLOFACIAL WITH PLUS RECONS   Final Result      15 x 6 x 5 mm low density anterior to the midline maxilla which could be phlegmon or early abscess           Assessment/Plan  Primary insomnia- (present on admission)  Assessment &  Plan  Continue home dose Ambien    Dental infection- (present on admission)  Assessment & Plan  15 x 6 x 5 mm low-density anterior to midline maxilla could be phlegmon or early abscess.    [place]. Persaud from [facility] [place] today, no urgent procedure needed.  Need to follow-up as outpatient for teeth removal.     Afebrile, normal WBC, de-escalate Zosyn to Unasyn       HIV (human immunodeficiency virus infection) (HCC)- (present on admission)  Assessment & Plan  Controlled with Biktarvy  Continue Biktarvy  With dental infection failed outpatient oral antibiotics, check viral load and CD4 count       VTE prophylaxis: SCDs/TEDs    I have performed a physical exam and reviewed and updated ROS and Plan today (7/3/2022). In review of yesterday's note (7/2/2022), there are no changes except as documented above.

## 2022-07-04 ENCOUNTER — PHARMACY VISIT (OUTPATIENT)
Dept: PHARMACY | Facility: MEDICAL CENTER | Age: 37
End: 2022-07-04
Payer: COMMERCIAL

## 2022-07-04 VITALS
SYSTOLIC BLOOD PRESSURE: 101 MMHG | TEMPERATURE: 97.6 F | HEART RATE: 74 BPM | HEIGHT: 71 IN | DIASTOLIC BLOOD PRESSURE: 55 MMHG | OXYGEN SATURATION: 94 % | BODY MASS INDEX: 25.48 KG/M2 | WEIGHT: 182 LBS | RESPIRATION RATE: 17 BRPM

## 2022-07-04 LAB
ANION GAP SERPL CALC-SCNC: 10 MMOL/L (ref 7–16)
BUN SERPL-MCNC: 21 MG/DL (ref 8–22)
CALCIUM SERPL-MCNC: 8.7 MG/DL (ref 8.5–10.5)
CHLORIDE SERPL-SCNC: 101 MMOL/L (ref 96–112)
CO2 SERPL-SCNC: 24 MMOL/L (ref 20–33)
CREAT SERPL-MCNC: 1.21 MG/DL (ref 0.5–1.4)
ERYTHROCYTE [DISTWIDTH] IN BLOOD BY AUTOMATED COUNT: 40.8 FL (ref 35.9–50)
GFR SERPLBLD CREATININE-BSD FMLA CKD-EPI: 79 ML/MIN/1.73 M 2
GLUCOSE SERPL-MCNC: 93 MG/DL (ref 65–99)
HCT VFR BLD AUTO: 40.8 % (ref 42–52)
HGB BLD-MCNC: 14.2 G/DL (ref 14–18)
MCH RBC QN AUTO: 32.3 PG (ref 27–33)
MCHC RBC AUTO-ENTMCNC: 34.8 G/DL (ref 33.7–35.3)
MCV RBC AUTO: 92.9 FL (ref 81.4–97.8)
PLATELET # BLD AUTO: 356 K/UL (ref 164–446)
PMV BLD AUTO: 8.1 FL (ref 9–12.9)
POTASSIUM SERPL-SCNC: 4.2 MMOL/L (ref 3.6–5.5)
RBC # BLD AUTO: 4.39 M/UL (ref 4.7–6.1)
SODIUM SERPL-SCNC: 135 MMOL/L (ref 135–145)
WBC # BLD AUTO: 6.1 K/UL (ref 4.8–10.8)

## 2022-07-04 PROCEDURE — RXMED WILLOW AMBULATORY MEDICATION CHARGE: Performed by: STUDENT IN AN ORGANIZED HEALTH CARE EDUCATION/TRAINING PROGRAM

## 2022-07-04 PROCEDURE — 700111 HCHG RX REV CODE 636 W/ 250 OVERRIDE (IP): Performed by: STUDENT IN AN ORGANIZED HEALTH CARE EDUCATION/TRAINING PROGRAM

## 2022-07-04 PROCEDURE — 80048 BASIC METABOLIC PNL TOTAL CA: CPT

## 2022-07-04 PROCEDURE — 99239 HOSP IP/OBS DSCHRG MGMT >30: CPT | Performed by: STUDENT IN AN ORGANIZED HEALTH CARE EDUCATION/TRAINING PROGRAM

## 2022-07-04 PROCEDURE — 700102 HCHG RX REV CODE 250 W/ 637 OVERRIDE(OP): Performed by: STUDENT IN AN ORGANIZED HEALTH CARE EDUCATION/TRAINING PROGRAM

## 2022-07-04 PROCEDURE — 36415 COLL VENOUS BLD VENIPUNCTURE: CPT

## 2022-07-04 PROCEDURE — A9270 NON-COVERED ITEM OR SERVICE: HCPCS | Performed by: STUDENT IN AN ORGANIZED HEALTH CARE EDUCATION/TRAINING PROGRAM

## 2022-07-04 PROCEDURE — 700105 HCHG RX REV CODE 258: Performed by: STUDENT IN AN ORGANIZED HEALTH CARE EDUCATION/TRAINING PROGRAM

## 2022-07-04 PROCEDURE — 85027 COMPLETE CBC AUTOMATED: CPT

## 2022-07-04 RX ORDER — AMOXICILLIN AND CLAVULANATE POTASSIUM 875; 125 MG/1; MG/1
1 TABLET, FILM COATED ORAL 2 TIMES DAILY
Qty: 20 TABLET | Refills: 0 | Status: SHIPPED | OUTPATIENT
Start: 2022-07-04 | End: 2022-07-14

## 2022-07-04 RX ADMIN — SODIUM CHLORIDE 3 G: 900 INJECTION INTRAVENOUS at 11:18

## 2022-07-04 RX ADMIN — SODIUM CHLORIDE 3 G: 900 INJECTION INTRAVENOUS at 00:14

## 2022-07-04 RX ADMIN — KETOROLAC TROMETHAMINE 15 MG: 30 INJECTION, SOLUTION INTRAMUSCULAR at 05:12

## 2022-07-04 RX ADMIN — BICTEGRAVIR SODIUM, EMTRICITABINE, AND TENOFOVIR ALAFENAMIDE FUMARATE 1 TABLET: 50; 200; 25 TABLET ORAL at 05:12

## 2022-07-04 RX ADMIN — SODIUM CHLORIDE 3 G: 900 INJECTION INTRAVENOUS at 05:12

## 2022-07-04 NOTE — PROGRESS NOTES
Patient's IV removed and discharge instructions given, patient voicing understanding. Patient discharged to home with friend walking out with escort.

## 2022-07-04 NOTE — CARE PLAN
Problem: Knowledge Deficit - Standard  Goal: Patient and family/care givers will demonstrate understanding of plan of care, disease process/condition, diagnostic tests and medications  Outcome: Progressing  Note: Discussed POC and medications with patient.  Patient verbalized understanding.     The patient is Stable - Low risk of patient condition declining or worsening    Shift Goals  Clinical Goals: pain control, antibiotics  Patient Goals: pain control, antibiotics

## 2022-07-04 NOTE — DISCHARGE INSTRUCTIONS
Discharge Instructions    Discharged to home by car with relative. Discharged via walking, hospital escort: Yes.  Special equipment needed: Not Applicable    Be sure to schedule a follow-up appointment with your primary care doctor or any specialists as instructed.     Discharge Plan:   Diet Plan: Discussed  Activity Level: Discussed  Confirmed Follow up Appointment: Patient to Call and Schedule Appointment  Confirmed Symptoms Management: Discussed  Medication Reconciliation Updated: Yes    I understand that a diet low in cholesterol, fat, and sodium is recommended for good health. Unless I have been given specific instructions below for another diet, I accept this instruction as my diet prescription.   Other diet: regular    Special Instructions: None    -Is this patient being discharged with medication to prevent blood clots?  No    Is patient discharged on Warfarin / Coumadin?   No     Dental Abscess    A dental abscess is an area of pus in or around a tooth. It comes from an infection. It can cause pain and other symptoms. Treatment will help with symptoms and prevent the infection from spreading.  Follow these instructions at home:  Medicines  Take over-the-counter and prescription medicines only as told by your dentist.  If you were prescribed an antibiotic medicine, take it as told by your dentist. Do not stop taking it even if you start to feel better.  If you were prescribed a gel that has numbing medicine in it, use it exactly as told.  Do not drive or use heavy machinery (like a ) while taking prescription pain medicine.  General instructions  Rinse out your mouth often with salt water.  To make salt water, dissolve ½-1 tsp of salt in 1 cup of warm water.  Eat a soft diet while your mouth is healing.  Drink enough fluid to keep your urine pale yellow.  Do not apply heat to the outside of your mouth.  Do not use any products that contain nicotine or tobacco. These include cigarettes and  e-cigarettes. If you need help quitting, ask your doctor.  Keep all follow-up visits as told by your dentist. This is important.  Prevent an abscess  Brush your teeth every morning and every night. Use fluoride toothpaste.  Floss your teeth each day.  Get dental cleanings as often as told by your dentist.  Think about getting dental sealant put on teeth that have deep holes (decay).  Drink water that has fluoride in it.  Most tap water has fluoride.  Check the label on bottled water to see if it has fluoride in it.  Drink water instead of sugary drinks.  Eat healthy meals and snacks.  Wear a mouth guard or face shield when you play sports.  Contact a doctor if:  Your pain is worse, and medicine does not help.  Get help right away if:  You have a fever or chills.  Your symptoms suddenly get worse.  You have a very bad headache.  You have problems breathing or swallowing.  You have trouble opening your mouth.  You have swelling in your neck or close to your eye.  Summary  A dental abscess is an area of pus in or around a tooth. It is caused by an infection.  Treatment will help with symptoms and prevent the infection from spreading.  Take over-the-counter and prescription medicines only as told by your dentist.  To prevent an abscess, take good care of your teeth. Brush your teeth every morning and night. Use floss every day.  Get dental cleanings as often as told by your dentist.  This information is not intended to replace advice given to you by your health care provider. Make sure you discuss any questions you have with your health care provider.  Document Released: 05/03/2016 Document Revised: 04/08/2020 Document Reviewed: 08/20/2018  Elsevier Patient Education © 2020 [employer]

## 2022-07-04 NOTE — CARE PLAN
Problem: Knowledge Deficit - Standard  Goal: Patient and family/care givers will demonstrate understanding of plan of care, disease process/condition, diagnostic tests and medications  Outcome: Progressing     Problem: Pain - Standard  Goal: Alleviation of pain or a reduction in pain to the patient’s comfort goal  Outcome: Progressing   The patient is Stable - Low risk of patient condition declining or worsening    Shift Goals  Clinical Goals: pain control, decreased swelling  Patient Goals: pain control, decreased swelling    Progress made toward(s) clinical / shift goals:  yes    Patient is not progressing towards the following goals:

## 2022-07-04 NOTE — DISCHARGE SUMMARY
Discharge Summary    CHIEF COMPLAINT ON ADMISSION  Chief Complaint   Patient presents with   • Dental Pain     Abscess tooth top front teeth. Prescribed abx by . Pain has been increasing. Seen by emergency dentist who stated pt needed to have abscess drained by oral surgeon.        Reason for Admission  dental pain     Admission Date  7/2/2022    CODE STATUS  Prior    HPI & HOSPITAL COURSE  Sy Sanz is a 37 y.o. male with HIV controlled with Biktarvy, who presented 7/2/2022 with dental infection failed outpatient antibiotics.  Patient took clindamycin for a week and Augmentin for 1 day.  Patient admitted for failed outpatient antibiotics.   CT maxillofacial with contrast showed 15 x 6 x 5 mm low-density anterior to midline maxilla could be phlegmon or early abscess.  Ortho surgery saw the patient today, no urgent procedure needed.  May follow-up as outpatient for teeth removal.   Patient has been treated with Unasyn and then transition to Augmentin for 10 days.  He will follow-up with oral surgeon for outpatient  teeth removal.     Therefore, he is discharged in good and stable condition to home with close outpatient follow-up.    The patient met 2-midnight criteria for an inpatient stay at the time of discharge.    Discharge Date  7/4/2022    FOLLOW UP ITEMS POST DISCHARGE  Follow up with primary care physician in 1 week.   Follow up with the dentist as instructed   please take the medications as instructed    Go to the local Emergency Department if you have any worsening condition.       DISCHARGE DIAGNOSES  Principal Problem:    Dental abscess POA: Yes  Active Problems:    HIV (human immunodeficiency virus infection) (Former[facility]) POA: Yes    Dental infection POA: Yes    Primary insomnia POA: Yes  Resolved Problems:    * No resolved hospital problems. *      FOLLOW UP    Sy Persaud D.D.S.  [address] [place] [id number] [phone]    Call        MEDICATIONS ON DISCHARGE    "  Medication List      CONTINUE taking these medications      Instructions   acetaminophen 500 MG Tabs  Commonly known as: TYLENOL   Take 1,000 mg by mouth 2 times a day.  Dose: 1,000 mg     amoxicillin-clavulanate 875-125 MG Tabs  Commonly known as: AUGMENTIN   Take 1 Tablet by mouth 2 times a day for 10 days.  Dose: 1 Tablet     BIKTARVY PO   Take 1 Tablet by mouth every day.  Dose: 1 Tablet     ibuprofen 800 MG Tabs  Commonly known as: MOTRIN   Take 1 Tablet by mouth every 8 hours as needed for Moderate Pain.  Dose: 800 mg     methocarbamol 750 MG Tabs  Commonly known as: ROBAXIN   Take 750 mg by mouth 4 times a day as needed (Muscle Pain).  Dose: 750 mg     zolpidem 5 MG Tabs  Commonly known as: AMBIEN   Take 5 mg by mouth every evening.  Dose: 5 mg        STOP taking these medications    clindamycin 300 MG Caps  Commonly known as: CLEOCIN     DEXTROAMPHETAMINE SULFATE PO     ZONISAMIDE PO            Allergies  Allergies   Allergen Reactions   • Morphine Anaphylaxis     Pt turned \"red\" and was put into an \"ice bath\" after receiving morphine at age 3    • Benadryl Allergy      Pt gets \"amped up, jittery and cannot sleep\"   • Diphenhydramine Anxiety       DIET  No orders of the defined types were placed in this encounter.      ACTIVITY  As tolerated.  Weight bearing as tolerated    CONSULTATIONS  Oral surgeon    PROCEDURES      LABORATORY  Lab Results   Component Value Date    SODIUM 135 07/04/2022    POTASSIUM 4.2 07/04/2022    CHLORIDE 101 07/04/2022    CO2 24 07/04/2022    GLUCOSE 93 07/04/2022    BUN 21 07/04/2022    CREATININE 1.21 07/04/2022        Lab Results   Component Value Date    WBC 6.1 07/04/2022    HEMOGLOBIN 14.2 07/04/2022    HEMATOCRIT 40.8 (L) 07/04/2022    PLATELETCT 356 07/04/2022        Total time of the discharge process exceeds 32 minutes.  "

## 2022-07-04 NOTE — PROGRESS NOTES
Report received and assumed care of patient. Patient sleeping with no distress noted. Call light in reach and safety measures in place.

## 2022-07-05 LAB
ANNOTATION COMMENT IMP: NORMAL
CD3 CELLS # BLD: 1686 CELLS/UL (ref 570–2400)
CD3+CD4+ CELLS # BLD: 945 CELLS/UL (ref 430–1800)
CD3+CD4+ CELLS/CD3+CD8+ CLL BLD: 1.33 RATIO (ref 0.8–3.9)
CD3+CD8+ CELLS # BLD: 710 CELLS/UL (ref 210–1200)

## 2022-07-07 LAB
BACTERIA BLD CULT: NORMAL
HIV1 RNA BLD QL NAA+PROBE: NOT DETECTED
SIGNIFICANT IND 70042: NORMAL
SITE SITE: NORMAL
SOURCE SOURCE: NORMAL

## 2022-07-07 NOTE — ASSESSMENT & PLAN NOTE
Controlled with Biktarvy  Continue Biktarvy  With dental infection failed outpatient oral antibiotics, check viral load and CD4 count   Minocycline Pregnancy And Lactation Text: This medication is Pregnancy Category D and not consider safe during pregnancy. It is also excreted in breast milk.

## 2022-07-08 LAB
BACTERIA BLD CULT: NORMAL
SIGNIFICANT IND 70042: NORMAL
SITE SITE: NORMAL
SOURCE SOURCE: NORMAL

## 2023-02-22 ENCOUNTER — HOSPITAL ENCOUNTER (EMERGENCY)
Facility: MEDICAL CENTER | Age: 38
End: 2023-02-22
Attending: EMERGENCY MEDICINE
Payer: COMMERCIAL

## 2023-02-22 VITALS
RESPIRATION RATE: 20 BRPM | WEIGHT: 189.38 LBS | TEMPERATURE: 98.5 F | BODY MASS INDEX: 26.51 KG/M2 | SYSTOLIC BLOOD PRESSURE: 128 MMHG | DIASTOLIC BLOOD PRESSURE: 72 MMHG | OXYGEN SATURATION: 100 % | HEIGHT: 71 IN | HEART RATE: 98 BPM

## 2023-02-22 DIAGNOSIS — H65.05 RECURRENT ACUTE SEROUS OTITIS MEDIA OF LEFT EAR: ICD-10-CM

## 2023-02-22 DIAGNOSIS — J40 BRONCHITIS: ICD-10-CM

## 2023-02-22 LAB
FLUAV RNA SPEC QL NAA+PROBE: NEGATIVE
FLUBV RNA SPEC QL NAA+PROBE: NEGATIVE
RSV RNA SPEC QL NAA+PROBE: NEGATIVE
SARS-COV-2 RNA RESP QL NAA+PROBE: DETECTED
SPECIMEN SOURCE: ABNORMAL

## 2023-02-22 PROCEDURE — C9803 HOPD COVID-19 SPEC COLLECT: HCPCS | Performed by: EMERGENCY MEDICINE

## 2023-02-22 PROCEDURE — C9803 HOPD COVID-19 SPEC COLLECT: HCPCS

## 2023-02-22 PROCEDURE — 0241U HCHG SARS-COV-2 COVID-19 NFCT DS RESP RNA 4 TRGT MIC: CPT

## 2023-02-22 PROCEDURE — 99283 EMERGENCY DEPT VISIT LOW MDM: CPT

## 2023-02-22 RX ORDER — ALBUTEROL SULFATE 90 UG/1
2 AEROSOL, METERED RESPIRATORY (INHALATION) EVERY 6 HOURS PRN
Qty: 8.5 G | Refills: 0 | Status: SHIPPED | OUTPATIENT
Start: 2023-02-22

## 2023-02-22 RX ORDER — AZITHROMYCIN 250 MG/1
TABLET, FILM COATED ORAL
Qty: 6 TABLET | Refills: 0 | Status: ACTIVE | OUTPATIENT
Start: 2023-02-22 | End: 2023-02-27

## 2023-02-22 RX ORDER — PREDNISONE 20 MG/1
60 TABLET ORAL DAILY
Qty: 30 TABLET | Refills: 0 | Status: SHIPPED | OUTPATIENT
Start: 2023-02-22 | End: 2023-02-27

## 2023-02-22 ASSESSMENT — FIBROSIS 4 INDEX: FIB4 SCORE: 0.44

## 2023-02-23 NOTE — ED TRIAGE NOTES
Sy Sanz  37 y.o.  male  Chief Complaint   Patient presents with    Cough     Pt c/o dry cough & nasal congestion x 2-3 days. Pt's main complaint is his dry very persistent cough. Also c/o L ear pressure. Hx asthma  - cough sounding tight in triage   Pt admits to using meth earlier for pain.     Pt c/o pain to L side of face around cheekbone - states he has a hx of abcess to his sinus & concerned about this. No swelling noted.     Patient educated on triage process, to alert staff if any changes in condition.

## 2023-02-23 NOTE — ED PROVIDER NOTES
ED Provider Note    CHIEF COMPLAINT  Chief Complaint   Patient presents with    Cough     Pt c/o dry cough & nasal congestion x 2-3 days. Pt's main complaint is his dry very persistent cough. Also c/o L ear pressure. Hx asthma  - cough sounding tight in triage       EXTERNAL RECORDS REVIEWED  Reviewed extensive ER visits    HPI/ROS  LIMITATION TO HISTORY   None  OUTSIDE HISTORIAN(S):  None    Sy Sanz is a 37 y.o. male who presents for evaluation of numerous complaints including persistent cough for 2 to 3 days left ear pressure as well as wheezing.  The patient does admit to using methamphetamine earlier today.  He specifically denies high fevers or chills.  No associated night sweats or weight loss.  No leg swelling or hemoptysis    PAST MEDICAL HISTORY   has a past medical history of Asthma, Chickenpox, and HIV (human immunodeficiency virus infection) (HCC).    SURGICAL HISTORY   has a past surgical history that includes club foot release (Bilateral) and appendectomy laparoscopic (7/7/2018).    FAMILY HISTORY  Family History   Problem Relation Age of Onset    Sleep Apnea Mother     Sleep Apnea Father        SOCIAL HISTORY  Social History     Tobacco Use    Smoking status: Some Days     Packs/day: 0.25     Years: 4.00     Pack years: 1.00     Types: Cigarettes    Smokeless tobacco: Never   Vaping Use    Vaping Use: Every day    Substances: Nicotine   Substance and Sexual Activity    Alcohol use: No    Drug use: Yes     Types: Inhaled     Comment: Marijuana, and methamphetamines      Sexual activity: Not on file       CURRENT MEDICATIONS  Home Medications       Reviewed by Aleksandra Hassan R.N. (Registered Nurse) on 02/22/23 at 1823  Med List Status: Not Addressed     Medication Last Dose Status   acetaminophen (TYLENOL) 500 MG Tab  Active   Bictegravir-Emtricitab-Tenofov (BIKTARVY PO)  Active   ibuprofen (MOTRIN) 800 MG Tab  Active   methocarbamol (ROBAXIN) 750 MG Tab  Active   zolpidem  "(AMBIEN) 5 MG Tab  Active                    ALLERGIES  Allergies   Allergen Reactions    Morphine Anaphylaxis     Pt turned \"red\" and was put into an \"ice bath\" after receiving morphine at age 3     Benadryl Allergy      Pt gets \"amped up, jittery and cannot sleep\"    Diphenhydramine Anxiety       PHYSICAL EXAM  VITAL SIGNS: BP (!) 159/79   Pulse (!) 105   Temp 36.7 °C (98.1 °F) (Temporal)   Resp 18   Ht 1.803 m (5' 11\")   Wt 85.9 kg (189 lb 6 oz)   SpO2 100%   BMI 26.41 kg/m²    Pulse ox interpretation: I interpret this pulse ox as normal.  Constitutional: Alert and oriented x 3, no acute Distress  HEENT: Atraumatic normocephalic, pupils are equal round reactive to light extraocular movements are intact. The nares is clear, external ears are normal, mouth shows moist mucous membranes normal dentition for age right tympanic membrane is erythematous and bulging posterior pharynx is erythematous without exudates or abscess scant rhonchi no increased work of breathing no wheezing  Neck: Supple, no JVD no tracheal deviation  Cardiovascular: Regular rate and rhythm no murmur rub or gallop 2+ pulses peripherally x4  Thorax & Lungs: No respiratory distress, no wheezes rales or rhonchi, No chest tenderness.   GI: Soft nontender nondistended positive bowel sounds, no peritoneal signs  Skin: Warm dry no acute rash or lesion  Musculoskeletal: Moving all extremities with full range and 5 of 5 strength no acute  deformity\  Neurologic: Cranial nerves III through XII are grossly intact no sensory deficit no cerebellar dysfunction   Psychiatric: Appropriate affect for situation at this time          DIAGNOSTIC STUDIES / PROCEDURES  \  COURSE & MEDICAL DECISION MAKING    ED Observation Status? No; Patient does not meet criteria for ED Observation.     INITIAL ASSESSMENT, COURSE AND PLAN  Care Narrative:   This is a very pleasant 37-year-old gentleman who presents here with left ear pain cough and wheezing.  He very clearly " has a URI.  He does not have any high risk features such as high high fever hypoxia or abnormal lung sounds.  He clearly has left-sided otitis media as well.  We will treat his ear infection with azithromycin and his asthma flare with refilling his albuterol and a short course of prednisone.  I counseled him to return as needed for new or worsening symptoms      ADDITIONAL PROBLEM LIST    DISPOSITION AND DISCUSSIONS      Decision tools and prescription drugs considered including, but not limited to: Patient will be prescribed antimicrobials, albuterol, steroids    FINAL DIAGNOSIS  Acute left-sided otitis media  Bronchitis with reactive airway disease       Electronically signed by: Tai Wan M.D., 2/22/2023 6:48 PM

## 2023-03-23 ENCOUNTER — APPOINTMENT (OUTPATIENT)
Dept: RADIOLOGY | Facility: MEDICAL CENTER | Age: 38
End: 2023-03-23
Attending: EMERGENCY MEDICINE
Payer: COMMERCIAL

## 2023-03-23 ENCOUNTER — HOSPITAL ENCOUNTER (EMERGENCY)
Facility: MEDICAL CENTER | Age: 38
End: 2023-03-23
Attending: EMERGENCY MEDICINE
Payer: COMMERCIAL

## 2023-03-23 VITALS
OXYGEN SATURATION: 98 % | HEART RATE: 75 BPM | BODY MASS INDEX: 27.16 KG/M2 | HEIGHT: 71 IN | SYSTOLIC BLOOD PRESSURE: 133 MMHG | RESPIRATION RATE: 18 BRPM | WEIGHT: 194 LBS | DIASTOLIC BLOOD PRESSURE: 82 MMHG | TEMPERATURE: 97.6 F

## 2023-03-23 DIAGNOSIS — S29.019A THORACIC MYOFASCIAL STRAIN, INITIAL ENCOUNTER: ICD-10-CM

## 2023-03-23 LAB
ALBUMIN SERPL BCP-MCNC: 4.4 G/DL (ref 3.2–4.9)
ALBUMIN/GLOB SERPL: 1.4 G/DL
ALP SERPL-CCNC: 93 U/L (ref 30–99)
ALT SERPL-CCNC: 28 U/L (ref 2–50)
ANION GAP SERPL CALC-SCNC: 12 MMOL/L (ref 7–16)
AST SERPL-CCNC: 25 U/L (ref 12–45)
BASOPHILS # BLD AUTO: 1.2 % (ref 0–1.8)
BASOPHILS # BLD: 0.09 K/UL (ref 0–0.12)
BILIRUB SERPL-MCNC: 0.6 MG/DL (ref 0.1–1.5)
BUN SERPL-MCNC: 12 MG/DL (ref 8–22)
CALCIUM ALBUM COR SERPL-MCNC: 9 MG/DL (ref 8.5–10.5)
CALCIUM SERPL-MCNC: 9.3 MG/DL (ref 8.5–10.5)
CHLORIDE SERPL-SCNC: 104 MMOL/L (ref 96–112)
CO2 SERPL-SCNC: 24 MMOL/L (ref 20–33)
CREAT SERPL-MCNC: 1.07 MG/DL (ref 0.5–1.4)
CRP SERPL HS-MCNC: <0.3 MG/DL (ref 0–0.75)
EOSINOPHIL # BLD AUTO: 0.19 K/UL (ref 0–0.51)
EOSINOPHIL NFR BLD: 2.5 % (ref 0–6.9)
ERYTHROCYTE [DISTWIDTH] IN BLOOD BY AUTOMATED COUNT: 41.2 FL (ref 35.9–50)
ERYTHROCYTE [SEDIMENTATION RATE] IN BLOOD BY WESTERGREN METHOD: 4 MM/HOUR (ref 0–20)
GFR SERPLBLD CREATININE-BSD FMLA CKD-EPI: 91 ML/MIN/1.73 M 2
GLOBULIN SER CALC-MCNC: 3.2 G/DL (ref 1.9–3.5)
GLUCOSE SERPL-MCNC: 84 MG/DL (ref 65–99)
HCT VFR BLD AUTO: 44.4 % (ref 42–52)
HGB BLD-MCNC: 16.2 G/DL (ref 14–18)
IMM GRANULOCYTES # BLD AUTO: 0.04 K/UL (ref 0–0.11)
IMM GRANULOCYTES NFR BLD AUTO: 0.5 % (ref 0–0.9)
LACTATE SERPL-SCNC: 0.9 MMOL/L (ref 0.5–2)
LYMPHOCYTES # BLD AUTO: 2.42 K/UL (ref 1–4.8)
LYMPHOCYTES NFR BLD: 31.2 % (ref 22–41)
MCH RBC QN AUTO: 32 PG (ref 27–33)
MCHC RBC AUTO-ENTMCNC: 36.5 G/DL (ref 33.7–35.3)
MCV RBC AUTO: 87.6 FL (ref 81.4–97.8)
MONOCYTES # BLD AUTO: 0.54 K/UL (ref 0–0.85)
MONOCYTES NFR BLD AUTO: 7 % (ref 0–13.4)
NEUTROPHILS # BLD AUTO: 4.47 K/UL (ref 1.82–7.42)
NEUTROPHILS NFR BLD: 57.6 % (ref 44–72)
NRBC # BLD AUTO: 0 K/UL
NRBC BLD-RTO: 0 /100 WBC
PLATELET # BLD AUTO: 335 K/UL (ref 164–446)
PMV BLD AUTO: 8.3 FL (ref 9–12.9)
POTASSIUM SERPL-SCNC: 3.4 MMOL/L (ref 3.6–5.5)
PROT SERPL-MCNC: 7.6 G/DL (ref 6–8.2)
RBC # BLD AUTO: 5.07 M/UL (ref 4.7–6.1)
SODIUM SERPL-SCNC: 140 MMOL/L (ref 135–145)
WBC # BLD AUTO: 7.8 K/UL (ref 4.8–10.8)

## 2023-03-23 PROCEDURE — 80053 COMPREHEN METABOLIC PANEL: CPT

## 2023-03-23 PROCEDURE — 71045 X-RAY EXAM CHEST 1 VIEW: CPT

## 2023-03-23 PROCEDURE — 72070 X-RAY EXAM THORAC SPINE 2VWS: CPT

## 2023-03-23 PROCEDURE — 85025 COMPLETE CBC W/AUTO DIFF WBC: CPT

## 2023-03-23 PROCEDURE — 83605 ASSAY OF LACTIC ACID: CPT

## 2023-03-23 PROCEDURE — 96375 TX/PRO/DX INJ NEW DRUG ADDON: CPT

## 2023-03-23 PROCEDURE — 72100 X-RAY EXAM L-S SPINE 2/3 VWS: CPT

## 2023-03-23 PROCEDURE — 700111 HCHG RX REV CODE 636 W/ 250 OVERRIDE (IP): Performed by: EMERGENCY MEDICINE

## 2023-03-23 PROCEDURE — 96374 THER/PROPH/DIAG INJ IV PUSH: CPT

## 2023-03-23 PROCEDURE — 87040 BLOOD CULTURE FOR BACTERIA: CPT

## 2023-03-23 PROCEDURE — 700105 HCHG RX REV CODE 258: Performed by: EMERGENCY MEDICINE

## 2023-03-23 PROCEDURE — 99284 EMERGENCY DEPT VISIT MOD MDM: CPT

## 2023-03-23 PROCEDURE — 36415 COLL VENOUS BLD VENIPUNCTURE: CPT

## 2023-03-23 PROCEDURE — 85652 RBC SED RATE AUTOMATED: CPT

## 2023-03-23 PROCEDURE — 86140 C-REACTIVE PROTEIN: CPT

## 2023-03-23 RX ORDER — ONDANSETRON 2 MG/ML
4 INJECTION INTRAMUSCULAR; INTRAVENOUS ONCE
Status: COMPLETED | OUTPATIENT
Start: 2023-03-23 | End: 2023-03-23

## 2023-03-23 RX ORDER — SODIUM CHLORIDE, SODIUM LACTATE, POTASSIUM CHLORIDE, CALCIUM CHLORIDE 600; 310; 30; 20 MG/100ML; MG/100ML; MG/100ML; MG/100ML
1000 INJECTION, SOLUTION INTRAVENOUS ONCE
Status: COMPLETED | OUTPATIENT
Start: 2023-03-23 | End: 2023-03-23

## 2023-03-23 RX ORDER — KETOROLAC TROMETHAMINE 30 MG/ML
30 INJECTION, SOLUTION INTRAMUSCULAR; INTRAVENOUS ONCE
Status: COMPLETED | OUTPATIENT
Start: 2023-03-23 | End: 2023-03-23

## 2023-03-23 RX ADMIN — KETOROLAC TROMETHAMINE 30 MG: 30 INJECTION, SOLUTION INTRAMUSCULAR at 16:28

## 2023-03-23 RX ADMIN — ONDANSETRON HYDROCHLORIDE 4 MG: 2 SOLUTION INTRAMUSCULAR; INTRAVENOUS at 15:26

## 2023-03-23 RX ADMIN — FENTANYL CITRATE 50 MCG: 50 INJECTION, SOLUTION INTRAMUSCULAR; INTRAVENOUS at 15:26

## 2023-03-23 RX ADMIN — SODIUM CHLORIDE, POTASSIUM CHLORIDE, SODIUM LACTATE AND CALCIUM CHLORIDE 1000 ML: 600; 310; 30; 20 INJECTION, SOLUTION INTRAVENOUS at 15:26

## 2023-03-23 NOTE — ED TRIAGE NOTES
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 He is c/o 9 out of 10 pain. Pt is ambulatory while hunched over, alert and oriented x 4.

## 2023-03-23 NOTE — ED PROVIDER NOTES
ED Provider Note            Primary Care Provider: Tammi Underwood M.D.    CHIEF COMPLAINT  Chief Complaint   Patient presents with    Back Pain     LIMITATION TO HISTORY   Select: : None    HPI/ROS  OUTSIDE HISTORIAN(S):      EXTERNAL RECORDS REVIEWED  Outpatient Notes including primary care visit within the last 6 months for abdominal pain as well as previous notes which reflected concerns for substance abuse    Sy Sanz is a 37 y.o. male who presents to the ED for upper back pain.  Patient reports that he was at work today stated that he had moved abnormally he felt a popping sensation in his upper back and then was having numbness tingling weakness in both of his upper extremities.  Pain is excruciating.  States that he fell over 1 time after this and was unable to get himself up.  He has had some slight chills no measured fever he does report injectable methamphetamine 3 days prior.  He does have a history of HIV but has been compliant with therapy and states that his last viral loads were undetectable.  He denies any other acute trauma or falls he has had no chest pain no shortness of breath no abdominal pain no pain numbness tingling weakness in lower extremities no other acute symptom changes or concerns at this time.      PAST MEDICAL HISTORY  Past Medical History:   Diagnosis Date    Asthma     Chickenpox     HIV (human immunodeficiency virus infection) (HCC)        SURGICAL HISTORY  Past Surgical History:   Procedure Laterality Date    APPENDECTOMY LAPAROSCOPIC  7/7/2018    Procedure: APPENDECTOMY LAPAROSCOPIC;  Surgeon: Avinash Hills M.D.;  Location: SURGERY [facility];  Service: General    CLUB FOOT RELEASE Bilateral        FAMILY HISTORY  Family History   Problem Relation Age of Onset    Sleep Apnea Mother     Sleep Apnea Father        SOCIAL HISTORY   reports that he has been smoking cigarettes. He has a 1.00 pack-year smoking history. He has never used smokeless  "tobacco. He reports current drug use. Drug: Inhaled. He reports that he does not drink alcohol.    CURRENT MEDICATIONS  Previous Medications    ACETAMINOPHEN (TYLENOL) 500 MG TAB    Take 1,000 mg by mouth 2 times a day.    ALBUTEROL 108 (90 BASE) MCG/ACT AERO SOLN INHALATION AEROSOL    Inhale 2 Puffs every 6 hours as needed for Shortness of Breath.    BICTEGRAVIR-EMTRICITAB-TENOFOV (BIKTARVY PO)    Take 1 Tablet by mouth every day.    IBUPROFEN (MOTRIN) 800 MG TAB    Take 1 Tablet by mouth every 8 hours as needed for Moderate Pain.    METHOCARBAMOL (ROBAXIN) 750 MG TAB    Take 750 mg by mouth 4 times a day as needed (Muscle Pain).    ZOLPIDEM (AMBIEN) 5 MG TAB    Take 5 mg by mouth every evening.       ALLERGIES  Morphine, Benadryl allergy, and Diphenhydramine    PHYSICAL EXAM  BP (!) 134/101   Pulse (!) 101   Temp 36.4 °C (97.6 °F) (Temporal)   Resp (!) 22   Ht 1.803 m (5' 11\")   Wt 88 kg (194 lb 0.1 oz)   SpO2 100%   BMI 27.06 kg/m²   Pulse ox interpretation: I interpret this pulse ox as normal.  Constitutional: Alert and oriented x 3, minimal distress  HEENT: Atraumatic normocephalic, pupils are equal round reactive to light extraocular movements are intact. The nares is clear, external ears are normal, mouth shows moist mucous membranes normal dentition for age  Neck: Supple, no JVD no tracheal deviation  Cardiovascular: Regular rate and rhythm no murmur rub or gallop 2+ pulses peripherally x4  Thorax & Lungs: No respiratory distress, no wheezes rales or rhonchi, No chest tenderness.   GI: Soft nontender nondistended positive bowel sounds, no peritoneal signs  Skin: Warm dry no acute rash or lesion  Musculoskeletal: Difficulty with range of motion bilateral shoulders normal flexion extension bilateral elbows normal  strength cap refill and sensation bilateral hands.  Tenderness throughout the thoracic spine no palpable step-off no erythema warmth or induration no tenderness throughout " lumbar  Neurologic: Cranial nerves III through XII are grossly intact no sensory deficit no cerebellar dysfunction   Psychiatric: Appropriate affect for situation at this time          DIAGNOSTIC STUDIES & PROCEDURES      Results for orders placed or performed during the hospital encounter of 03/23/23   LACTIC ACID   Result Value Ref Range    Lactic Acid 0.9 0.5 - 2.0 mmol/L   CBC WITH DIFFERENTIAL   Result Value Ref Range    WBC 7.8 4.8 - 10.8 K/uL    RBC 5.07 4.70 - 6.10 M/uL    Hemoglobin 16.2 14.0 - 18.0 g/dL    Hematocrit 44.4 42.0 - 52.0 %    MCV 87.6 81.4 - 97.8 fL    MCH 32.0 27.0 - 33.0 pg    MCHC 36.5 (H) 33.7 - 35.3 g/dL    RDW 41.2 35.9 - 50.0 fL    Platelet Count 335 164 - 446 K/uL    MPV 8.3 (L) 9.0 - 12.9 fL    Neutrophils-Polys 57.60 44.00 - 72.00 %    Lymphocytes 31.20 22.00 - 41.00 %    Monocytes 7.00 0.00 - 13.40 %    Eosinophils 2.50 0.00 - 6.90 %    Basophils 1.20 0.00 - 1.80 %    Immature Granulocytes 0.50 0.00 - 0.90 %    Nucleated RBC 0.00 /100 WBC    Neutrophils (Absolute) 4.47 1.82 - 7.42 K/uL    Lymphs (Absolute) 2.42 1.00 - 4.80 K/uL    Monos (Absolute) 0.54 0.00 - 0.85 K/uL    Eos (Absolute) 0.19 0.00 - 0.51 K/uL    Baso (Absolute) 0.09 0.00 - 0.12 K/uL    Immature Granulocytes (abs) 0.04 0.00 - 0.11 K/uL    NRBC (Absolute) 0.00 K/uL   COMP METABOLIC PANEL   Result Value Ref Range    Sodium 140 135 - 145 mmol/L    Potassium 3.4 (L) 3.6 - 5.5 mmol/L    Chloride 104 96 - 112 mmol/L    Co2 24 20 - 33 mmol/L    Anion Gap 12.0 7.0 - 16.0    Glucose 84 65 - 99 mg/dL    Bun 12 8 - 22 mg/dL    Creatinine 1.07 0.50 - 1.40 mg/dL    Calcium 9.3 8.5 - 10.5 mg/dL    AST(SGOT) 25 12 - 45 U/L    ALT(SGPT) 28 2 - 50 U/L    Alkaline Phosphatase 93 30 - 99 U/L    Total Bilirubin 0.6 0.1 - 1.5 mg/dL    Albumin 4.4 3.2 - 4.9 g/dL    Total Protein 7.6 6.0 - 8.2 g/dL    Globulin 3.2 1.9 - 3.5 g/dL    A-G Ratio 1.4 g/dL   C Reactive Protein Quantitative (Non-Cardiac)   Result Value Ref Range    Stat C-Reactive  Protein <0.30 0.00 - 0.75 mg/dL   Sed Rate   Result Value Ref Range    Sed Rate Westergren 4 0 - 20 mm/hour   ESTIMATED GFR   Result Value Ref Range    GFR (CKD-EPI) 91 >60 mL/min/1.73 m 2   CORRECTED CALCIUM   Result Value Ref Range    Correct Calcium 9.0 8.5 - 10.5 mg/dL         Radiology:       DX-THORACIC SPINE-2 VIEWS   Final Result      No evidence of fracture or degenerative disc disease.      DX-LUMBAR SPINE-2 OR 3 VIEWS   Final Result      1.  Minimal chronic superior endplate compression fracture involving L2 with mild kyphotic deformity.      2.  Mild degenerative disc disease at the L1-2 level.      DX-CHEST-PORTABLE (1 VIEW)   Final Result      No evidence of acute cardiopulmonary process.           COURSE & MEDICAL DECISION MAKING    ED Observation Status? Yes; I am placing the patient in to an observation status due to a diagnostic uncertainty as well as therapeutic intensity. Patient placed in observation status at 14:25 PM, 3/23/2023.     Observation plan is as follows: Patient high risk for spinal epidural abscess/infection due to HIV and recent IVDA.  Patient with x-rays of the area we will also complete lactic acid CRP and ESR for any signs of acute inflammation that would raise suspicion otherwise will be treated symptomatically and will obtain plain film x-rays of the affected areas of his back.    Upon Reevaluation, the patient's condition has: Improved; and will be discharged.    Patient discharged from ED Observation status at 1915 (Time) 3/23/23 (Date).     ASSESSMENT AND PLAN  Care Narrative: 37-year-old male presents with cute onset upper back pain and difficulty with movement in his upper extremities.  Patient is high risk due to admitted recent IVDA as well as HIV.  Does report however that recent viral loads have been undetectable and been compliant with his HIV therapy.  Patient CBC is unremarkable his CRP ESR and lactic acid are all normal.  X-rays of the thoracic and lumbar spine  "are unremarkable was treated symptomatically initially with fentanyl and Zofran followed by Toradol after reassuring labs and has had complete resolution of symptoms.  Patient is given instructions to take ibuprofen Tylenol for his symptoms given instructions return for worsening pain numbness tingling weakness fevers chills any other acute symptom change or concern otherwise follow-up with primary care discharged in stable and improved condition.          ADDITIONAL PROBLEM LIST AND DISPOSITION  Hiv, IVDA, continue follow-up as an outpatient with primary care avoid any illicit substances               DISPOSITION AND DISCUSSIONS  I have discussed management of the patient with the following physicians and JOHNNIE's:     Discussion of management with other QHP or appropriate source(s): None     Escalation of care considered, and ultimately not performed: acute inpatient care management, however at this time, the patient is most appropriate for outpatient management.  Considered MRI however deemed not necessary due to complete resolution of symptoms and negative ESR CRP lactic acid and CBC.  Suspicion for space-occupying lesion or infectious process minimized by resolution of symptoms and reassuring inflammatory markers.    Barriers to care at this time, including but not limited to: .     Decision tools and prescription drugs considered including, but not limited to: .      /82   Pulse 75   Temp 36.4 °C (97.6 °F)   Resp 18   Ht 1.803 m (5' 11\")   Wt 88 kg (194 lb 0.1 oz)   SpO2 98%   BMI 27.06 kg/m²           FINAL IMPRESSION  1. Thoracic myofascial strain, initial encounter        PRESCRIPTIONS  Discharge Medication List as of 3/23/2023  7:21 PM          FOLLOW UP  Tammi Underwood M.D.  [address] [facility] [id number] [phone]          [facility], Emergency Dept  [address] [facility] [address] [phone]    in 12-24 hours if symptoms persist, immediately If " symptoms worsen, or if you develop any other symptoms or concerns        -DISCHARGE-

## 2023-09-06 ENCOUNTER — HOSPITAL ENCOUNTER (EMERGENCY)
Facility: MEDICAL CENTER | Age: 38
End: 2023-09-07
Attending: STUDENT IN AN ORGANIZED HEALTH CARE EDUCATION/TRAINING PROGRAM
Payer: COMMERCIAL

## 2023-09-06 ENCOUNTER — APPOINTMENT (OUTPATIENT)
Dept: RADIOLOGY | Facility: MEDICAL CENTER | Age: 38
End: 2023-09-06
Attending: STUDENT IN AN ORGANIZED HEALTH CARE EDUCATION/TRAINING PROGRAM
Payer: COMMERCIAL

## 2023-09-06 DIAGNOSIS — L03.211 FACIAL CELLULITIS: ICD-10-CM

## 2023-09-06 LAB
BASOPHILS # BLD AUTO: 1.3 % (ref 0–1.8)
BASOPHILS # BLD: 0.1 K/UL (ref 0–0.12)
EOSINOPHIL # BLD AUTO: 0.16 K/UL (ref 0–0.51)
EOSINOPHIL NFR BLD: 2.2 % (ref 0–6.9)
ERYTHROCYTE [DISTWIDTH] IN BLOOD BY AUTOMATED COUNT: 40.4 FL (ref 35.9–50)
HCT VFR BLD AUTO: 40.6 % (ref 42–52)
HGB BLD-MCNC: 14.4 G/DL (ref 14–18)
IMM GRANULOCYTES # BLD AUTO: 0.12 K/UL (ref 0–0.11)
IMM GRANULOCYTES NFR BLD AUTO: 1.6 % (ref 0–0.9)
LYMPHOCYTES # BLD AUTO: 2.16 K/UL (ref 1–4.8)
LYMPHOCYTES NFR BLD: 29.1 % (ref 22–41)
MCH RBC QN AUTO: 32.1 PG (ref 27–33)
MCHC RBC AUTO-ENTMCNC: 35.5 G/DL (ref 32.3–36.5)
MCV RBC AUTO: 90.4 FL (ref 81.4–97.8)
MONOCYTES # BLD AUTO: 0.62 K/UL (ref 0–0.85)
MONOCYTES NFR BLD AUTO: 8.4 % (ref 0–13.4)
NEUTROPHILS # BLD AUTO: 4.26 K/UL (ref 1.82–7.42)
NEUTROPHILS NFR BLD: 57.4 % (ref 44–72)
NRBC # BLD AUTO: 0 K/UL
NRBC BLD-RTO: 0 /100 WBC (ref 0–0.2)
PLATELET # BLD AUTO: 340 K/UL (ref 164–446)
PMV BLD AUTO: 8.5 FL (ref 9–12.9)
RBC # BLD AUTO: 4.49 M/UL (ref 4.7–6.1)
WBC # BLD AUTO: 7.4 K/UL (ref 4.8–10.8)

## 2023-09-06 PROCEDURE — 99284 EMERGENCY DEPT VISIT MOD MDM: CPT

## 2023-09-06 PROCEDURE — 85025 COMPLETE CBC W/AUTO DIFF WBC: CPT

## 2023-09-06 PROCEDURE — 36415 COLL VENOUS BLD VENIPUNCTURE: CPT

## 2023-09-06 PROCEDURE — 700105 HCHG RX REV CODE 258: Performed by: STUDENT IN AN ORGANIZED HEALTH CARE EDUCATION/TRAINING PROGRAM

## 2023-09-06 PROCEDURE — 87040 BLOOD CULTURE FOR BACTERIA: CPT

## 2023-09-06 PROCEDURE — A9270 NON-COVERED ITEM OR SERVICE: HCPCS | Performed by: STUDENT IN AN ORGANIZED HEALTH CARE EDUCATION/TRAINING PROGRAM

## 2023-09-06 PROCEDURE — 80053 COMPREHEN METABOLIC PANEL: CPT

## 2023-09-06 PROCEDURE — 700102 HCHG RX REV CODE 250 W/ 637 OVERRIDE(OP): Performed by: STUDENT IN AN ORGANIZED HEALTH CARE EDUCATION/TRAINING PROGRAM

## 2023-09-06 PROCEDURE — 83605 ASSAY OF LACTIC ACID: CPT

## 2023-09-06 RX ORDER — SODIUM CHLORIDE 9 MG/ML
1300 INJECTION, SOLUTION INTRAVENOUS ONCE
Status: COMPLETED | OUTPATIENT
Start: 2023-09-06 | End: 2023-09-07

## 2023-09-06 RX ORDER — SODIUM CHLORIDE 9 MG/ML
INJECTION, SOLUTION INTRAVENOUS ONCE
Status: COMPLETED | OUTPATIENT
Start: 2023-09-06 | End: 2023-09-07

## 2023-09-06 RX ORDER — NAPROXEN 250 MG/1
500 TABLET ORAL ONCE
Status: COMPLETED | OUTPATIENT
Start: 2023-09-06 | End: 2023-09-06

## 2023-09-06 RX ORDER — ACETAMINOPHEN 500 MG
1000 TABLET ORAL ONCE
Status: COMPLETED | OUTPATIENT
Start: 2023-09-06 | End: 2023-09-06

## 2023-09-06 RX ADMIN — SODIUM CHLORIDE 1300 ML: 9 INJECTION, SOLUTION INTRAVENOUS at 23:30

## 2023-09-06 RX ADMIN — NAPROXEN 500 MG: 250 TABLET ORAL at 23:30

## 2023-09-06 RX ADMIN — SODIUM CHLORIDE: 9 INJECTION, SOLUTION INTRAVENOUS at 23:30

## 2023-09-06 RX ADMIN — ACETAMINOPHEN 1000 MG: 500 TABLET ORAL at 23:30

## 2023-09-06 ASSESSMENT — FIBROSIS 4 INDEX: FIB4 SCORE: 0.55

## 2023-09-07 VITALS
OXYGEN SATURATION: 96 % | HEART RATE: 78 BPM | RESPIRATION RATE: 20 BRPM | SYSTOLIC BLOOD PRESSURE: 140 MMHG | BODY MASS INDEX: 25.9 KG/M2 | HEIGHT: 71 IN | DIASTOLIC BLOOD PRESSURE: 89 MMHG | TEMPERATURE: 96.7 F | WEIGHT: 184.97 LBS

## 2023-09-07 LAB
ALBUMIN SERPL BCP-MCNC: 4 G/DL (ref 3.2–4.9)
ALBUMIN/GLOB SERPL: 1.6 G/DL
ALP SERPL-CCNC: 84 U/L (ref 30–99)
ALT SERPL-CCNC: 19 U/L (ref 2–50)
ANION GAP SERPL CALC-SCNC: 10 MMOL/L (ref 7–16)
AST SERPL-CCNC: 19 U/L (ref 12–45)
BILIRUB SERPL-MCNC: 0.4 MG/DL (ref 0.1–1.5)
BUN SERPL-MCNC: 13 MG/DL (ref 8–22)
CALCIUM ALBUM COR SERPL-MCNC: 8.8 MG/DL (ref 8.5–10.5)
CALCIUM SERPL-MCNC: 8.8 MG/DL (ref 8.4–10.2)
CHLORIDE SERPL-SCNC: 102 MMOL/L (ref 96–112)
CO2 SERPL-SCNC: 25 MMOL/L (ref 20–33)
CREAT SERPL-MCNC: 1.11 MG/DL (ref 0.5–1.4)
GFR SERPLBLD CREATININE-BSD FMLA CKD-EPI: 86 ML/MIN/1.73 M 2
GLOBULIN SER CALC-MCNC: 2.5 G/DL (ref 1.9–3.5)
GLUCOSE SERPL-MCNC: 119 MG/DL (ref 65–99)
LACTATE SERPL-SCNC: 0.9 MMOL/L (ref 0.5–2)
POTASSIUM SERPL-SCNC: 3.3 MMOL/L (ref 3.6–5.5)
PROT SERPL-MCNC: 6.5 G/DL (ref 6–8.2)
SODIUM SERPL-SCNC: 137 MMOL/L (ref 135–145)

## 2023-09-07 PROCEDURE — 70487 CT MAXILLOFACIAL W/DYE: CPT

## 2023-09-07 PROCEDURE — 700117 HCHG RX CONTRAST REV CODE 255: Performed by: STUDENT IN AN ORGANIZED HEALTH CARE EDUCATION/TRAINING PROGRAM

## 2023-09-07 RX ORDER — CEPHALEXIN 500 MG/1
1000 CAPSULE ORAL 2 TIMES DAILY
Qty: 28 CAPSULE | Refills: 0 | Status: ACTIVE | OUTPATIENT
Start: 2023-09-07 | End: 2023-09-14

## 2023-09-07 RX ORDER — DOXYCYCLINE 100 MG/1
100 CAPSULE ORAL 2 TIMES DAILY
Qty: 14 CAPSULE | Refills: 0 | Status: SHIPPED | OUTPATIENT
Start: 2023-09-07 | End: 2023-09-07

## 2023-09-07 RX ORDER — SULFAMETHOXAZOLE AND TRIMETHOPRIM 800; 160 MG/1; MG/1
1 TABLET ORAL 2 TIMES DAILY
Qty: 14 TABLET | Refills: 0 | Status: ACTIVE | OUTPATIENT
Start: 2023-09-07 | End: 2023-12-05

## 2023-09-07 RX ADMIN — IOHEXOL 80 ML: 350 INJECTION, SOLUTION INTRAVENOUS at 00:36
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
## 2023-09-07 NOTE — ED TRIAGE NOTES
Pt. Admits to using methamphetamines PTA. Charge RN made aware of pt. Need for room d/t complaints of unilateral numbness. See FAST ED scale.

## 2023-09-07 NOTE — ED NOTES
IV removed. Patient provided discharge instructions and prescription education. Patient denies questions at this time. Patient ambulated out of ED independently with steady gait. No acute changes or concerns at this time.

## 2023-12-05 ENCOUNTER — APPOINTMENT (OUTPATIENT)
Dept: RADIOLOGY | Facility: MEDICAL CENTER | Age: 38
End: 2023-12-05
Attending: EMERGENCY MEDICINE
Payer: MEDICAID

## 2023-12-05 ENCOUNTER — HOSPITAL ENCOUNTER (EMERGENCY)
Facility: MEDICAL CENTER | Age: 38
End: 2023-12-05
Attending: EMERGENCY MEDICINE
Payer: MEDICAID

## 2023-12-05 VITALS
RESPIRATION RATE: 14 BRPM | TEMPERATURE: 98.4 F | HEIGHT: 71 IN | BODY MASS INDEX: 26.33 KG/M2 | HEART RATE: 87 BPM | OXYGEN SATURATION: 97 % | DIASTOLIC BLOOD PRESSURE: 51 MMHG | SYSTOLIC BLOOD PRESSURE: 100 MMHG | WEIGHT: 188.05 LBS

## 2023-12-05 DIAGNOSIS — R45.850 HOMICIDAL IDEATION: ICD-10-CM

## 2023-12-05 DIAGNOSIS — R45.1 AGITATION: ICD-10-CM

## 2023-12-05 DIAGNOSIS — R45.851 SUICIDAL IDEATION: ICD-10-CM

## 2023-12-05 LAB
FLUAV RNA SPEC QL NAA+PROBE: NEGATIVE
FLUBV RNA SPEC QL NAA+PROBE: NEGATIVE
POC BREATHALIZER: 0 PERCENT (ref 0–0.01)
RSV RNA SPEC QL NAA+PROBE: NEGATIVE
SARS-COV-2 RNA RESP QL NAA+PROBE: NOTDETECTED
SPECIMEN SOURCE: NORMAL

## 2023-12-05 PROCEDURE — 90791 PSYCH DIAGNOSTIC EVALUATION: CPT

## 2023-12-05 PROCEDURE — A9270 NON-COVERED ITEM OR SERVICE: HCPCS | Performed by: EMERGENCY MEDICINE

## 2023-12-05 PROCEDURE — 99285 EMERGENCY DEPT VISIT HI MDM: CPT

## 2023-12-05 PROCEDURE — C9803 HOPD COVID-19 SPEC COLLECT: HCPCS | Performed by: EMERGENCY MEDICINE

## 2023-12-05 PROCEDURE — 0241U HCHG SARS-COV-2 COVID-19 NFCT DS RESP RNA 4 TRGT MIC: CPT

## 2023-12-05 PROCEDURE — 700102 HCHG RX REV CODE 250 W/ 637 OVERRIDE(OP): Performed by: EMERGENCY MEDICINE

## 2023-12-05 PROCEDURE — 71045 X-RAY EXAM CHEST 1 VIEW: CPT

## 2023-12-05 PROCEDURE — 302970 POC BREATHALIZER: Performed by: EMERGENCY MEDICINE

## 2023-12-05 RX ORDER — TADALAFIL 5 MG/1
5 TABLET ORAL
COMMUNITY

## 2023-12-05 RX ORDER — ESCITALOPRAM OXALATE 10 MG/1
10 TABLET ORAL DAILY
COMMUNITY

## 2023-12-05 RX ORDER — CYCLOBENZAPRINE HCL 10 MG
10 TABLET ORAL NIGHTLY PRN
COMMUNITY

## 2023-12-05 RX ORDER — OLANZAPINE 5 MG/1
5 TABLET ORAL EVERY EVENING
Status: DISCONTINUED | OUTPATIENT
Start: 2023-12-05 | End: 2023-12-05 | Stop reason: HOSPADM

## 2023-12-05 RX ORDER — BICTEGRAVIR SODIUM, EMTRICITABINE, AND TENOFOVIR ALAFENAMIDE FUMARATE 50; 200; 25 MG/1; MG/1; MG/1
1 TABLET ORAL DAILY
COMMUNITY

## 2023-12-05 RX ORDER — OLANZAPINE 5 MG/1
10 TABLET, ORALLY DISINTEGRATING ORAL ONCE
Status: COMPLETED | OUTPATIENT
Start: 2023-12-05 | End: 2023-12-05

## 2023-12-05 RX ORDER — DEXTROAMPHETAMINE SACCHARATE, AMPHETAMINE ASPARTATE, DEXTROAMPHETAMINE SULFATE AND AMPHETAMINE SULFATE 2.5; 2.5; 2.5; 2.5 MG/1; MG/1; MG/1; MG/1
10 TABLET ORAL 3 TIMES DAILY
COMMUNITY

## 2023-12-05 RX ADMIN — OLANZAPINE 5 MG: 5 TABLET, FILM COATED ORAL at 20:55

## 2023-12-05 RX ADMIN — OLANZAPINE 10 MG: 5 TABLET, ORALLY DISINTEGRATING ORAL at 16:28

## 2023-12-05 ASSESSMENT — FIBROSIS 4 INDEX: FIB4 SCORE: 0.48
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
## 2023-12-05 NOTE — ED TRIAGE NOTES
Chief Complaint   Patient presents with    Homicidal Ideation     Pt reports he feels like he is losing it. Wants to hurt everyone and break everything. Reports he wanted to run over a homeless man on the street, kill multiple police officers at a stoplight and break all the windows in the parking lot. States he was released from [facility] on the 2nd and has been on a downward slide since.       Patient states he is recently homeless and going through a lot. Patient would like resources and medication. Patient ambulatory to triage for above complaint. Patient A&Ox4, GCS 15, patient speaking in full sentences. Equal and unlabored respirations. Charge notified of patient.  Appropriate protocols ordered. Patient cooperative at this time. Legal 2000 initiated.

## 2023-12-06 NOTE — ED NOTES
Bedside report received from ROYCE Garcia.  Pt checked on bed, with unlabored respirations. No safety risk noted.    Sleeping on [facility]   Security Sitter - 1:1 with continuous visual monitoring by Trained Personnel  Continued safety precaution  Gurney in low position, side rail up for pt safety.   No needs identified at the moment    Pending:  Urine sample for UDS.

## 2023-12-06 NOTE — DISCHARGE PLANNING
Alert Team:    Pt has been accepted by Dr. Wells at [facility]. Requesting transport as soon as possible to be arranged via [facility] by this writer. Updated RN and ERP.

## 2023-12-06 NOTE — DISCHARGE SUMMARY
"  ED Observation Discharge Summary    Patient:Sy Sanz  Patient : 1985  Patient MRN: [id number]  Patient PCP: Tammi Underwood M.D.    Admit Date: 2023  Discharge Date and Time: 23 8:48 PM  Discharge Diagnosis: Homicidal ideation  Discharge Attending: Juaquin Mueller M.D.  Discharge Service: ED Observation    ED Course  Sy is a 38 y.o. male who was evaluated at [facility] in the setting of homicidal ideation.  He was evaluated by my colleague who obtained a chest x-ray and Fluvid swab in the setting of some report of a cough which demonstrated no evidence of pneumonia and a negative viral testing.  Breathalyzer was negative.  Patient was provided with 10 mg of oral Zydis with improvement of agitation.  He was placed on a legal hold given his homicidal ideation, formally evaluated by behavioral health team and found to warrant continued legal hold pending transfer to an inpatient psychiatric facility.  He was signed out to me for continued observation pending transfer.  Ultimately [facility].    Discharge Exam:  /51   Pulse 87   Temp 36.9 °C (98.4 °F) (Temporal)   Resp 14   Ht 1.803 m (5' 11\")   Wt 85.3 kg (188 lb 0.8 oz)   SpO2 97%   BMI 26.23 kg/m² .    Constitutional: Sleeping quietly.  Nontoxic.  HENT:  Grossly normal  Eyes: Grossly normal  Neck: Normal range of motion  Cardiovascular: Normal heart rate   Thorax & Lungs: No respiratory distress  Abdomen: Nontender  Skin:  No pathologic rash.   Extremities: Well perfused  Psychiatric: Sleeping    Labs  Results for orders placed or performed during the hospital encounter of 23   CoV-2, FLU A/B, and RSV by PCR (2-4 Hours CEPHEID) : Collect NP swab in VTM    Specimen: Respirate   Result Value Ref Range    Influenza virus A RNA Negative Negative    Influenza virus B, PCR Negative Negative    RSV, PCR Negative Negative    SARS-CoV-2 by PCR NotDetected     " SARS-CoV-2 Source NP Swab    POC BREATHALIZER   Result Value Ref Range    POC Breathalizer 0.00 0.00 - 0.01 Percent       Radiology  DX-CHEST-PORTABLE (1 VIEW)   Final Result      No acute cardiopulmonary disease evident.          Medications:   New Prescriptions    No medications on file       My final assessment includes agitation, homicidal ideation, suicidal ideation  Upon Reevaluation, the patient's condition has: not improved; and will be escalated to hospitalization.    Patient discharged from ED Observation status at 8:48 PM (Time) 12/6/2023 (Date).     Total time spent on this ED Observation discharge encounter is > 30 Minutes    Electronically signed by: Juaquin Mueller M.D., 12/5/2023 8:48 PM

## 2023-12-06 NOTE — DISCHARGE PLANNING
[facility] ALERT TEAM DISCHARGE PLANNING NOTE    Date:  12/5/23  Patient Name:  Sy Sanz - 38 y.o. - Discharge Planning  MRN:  [id number]   YOB: 1985  ADMISSION DATE:  12/5/2023     Writer forwarded referral packet for inpatient psychiatric care to the following community providers:  [facility]    Items included in the referral packet:   __x___Face Sheet   __x___Pages 1 and 2 of completed legal hold   __x___Alert Team/Psych Assessment   _____H&P   _____UDS   __x___Blood Alcohol   __x___Vital signs   __n/a___Pregnancy Test (if applicable)   __x___Medications List   _____Covid Screen

## 2023-12-06 NOTE — ED NOTES
Checked on bed, with unlabored respirations. No safety risk noted  Awake provided dinner tray  Security Sitter - 1:1 with continuous visual monitoring by Trained Personnel  Continued safety precaution  Emekardat in low position, side rail up for pt safety.   No needs identified at the moment

## 2023-12-06 NOTE — ED NOTES
VS updated, pt aware of need for urine sample, no needs reported at this time. Diet tray ordered per protocol.

## 2023-12-06 NOTE — ED NOTES
Med Rec complete per patient and pharmacies    Allergies reviewed  Antibiotics in the past 30 days:no  Anticoagulant in past 14 days:no  Pharmacy patient utilizes:[facility]    Per [facility]: on 10/5/2023 Adderall 10 mg TID was filled for a 30 DS; on 06/2022 Robaxin 750 mg QID PRN was filled for a 30 DS

## 2023-12-06 NOTE — ED NOTES
Per pt ALEXANDRIA Wiser will be coming to  pt keys. [place] are currently in pt belongings bag in pants.

## 2023-12-06 NOTE — DISCHARGE INSTRUCTIONS
Please know that you can return to the emergency department at any time if you are feeling unsafe.  Please follow-up with your primary care doctor to discuss this episode.

## 2024-03-06 ENCOUNTER — OFFICE VISIT (OUTPATIENT)
Dept: URGENT CARE | Facility: CLINIC | Age: 39
End: 2024-03-06
Payer: MEDICAID

## 2024-03-06 VITALS
DIASTOLIC BLOOD PRESSURE: 78 MMHG | HEIGHT: 71 IN | RESPIRATION RATE: 12 BRPM | HEART RATE: 124 BPM | OXYGEN SATURATION: 98 % | SYSTOLIC BLOOD PRESSURE: 120 MMHG | BODY MASS INDEX: 24.5 KG/M2 | TEMPERATURE: 99.3 F | WEIGHT: 175 LBS

## 2024-03-06 DIAGNOSIS — L03.113 CELLULITIS OF RIGHT UPPER EXTREMITY: ICD-10-CM

## 2024-03-06 PROCEDURE — 3078F DIAST BP <80 MM HG: CPT | Performed by: PHYSICIAN ASSISTANT

## 2024-03-06 PROCEDURE — 3074F SYST BP LT 130 MM HG: CPT | Performed by: PHYSICIAN ASSISTANT

## 2024-03-06 PROCEDURE — 99214 OFFICE O/P EST MOD 30 MIN: CPT | Performed by: PHYSICIAN ASSISTANT

## 2024-03-06 RX ORDER — HYDROXYZINE PAMOATE 50 MG/1
50 CAPSULE ORAL 3 TIMES DAILY PRN
COMMUNITY

## 2024-03-06 RX ORDER — OLANZAPINE 10 MG/1
10 TABLET ORAL NIGHTLY
COMMUNITY

## 2024-03-06 RX ORDER — SULFAMETHOXAZOLE AND TRIMETHOPRIM 800; 160 MG/1; MG/1
1 TABLET ORAL 2 TIMES DAILY
Qty: 14 TABLET | Refills: 0 | Status: SHIPPED | OUTPATIENT
Start: 2024-03-06 | End: 2024-03-13

## 2024-03-06 RX ORDER — TRAZODONE HYDROCHLORIDE 50 MG/1
50 TABLET ORAL NIGHTLY
COMMUNITY

## 2024-03-06 ASSESSMENT — ENCOUNTER SYMPTOMS
FEVER: 0
CHILLS: 0
DIZZINESS: 0
HEADACHES: 0
MYALGIAS: 0
VOMITING: 0
NAUSEA: 0

## 2024-03-06 ASSESSMENT — FIBROSIS 4 INDEX: FIB4 SCORE: 0.48

## 2024-05-10 ENCOUNTER — OFFICE VISIT (OUTPATIENT)
Dept: WOUND CARE | Facility: MEDICAL CENTER | Age: 39
End: 2024-05-10
Attending: NURSE PRACTITIONER
Payer: MEDICAID

## 2024-05-10 DIAGNOSIS — Z91.199 NO-SHOW FOR APPOINTMENT: ICD-10-CM

## 2024-05-10 PROCEDURE — 99999 PR NO CHARGE: CPT | Performed by: NURSE PRACTITIONER

## 2024-05-10 NOTE — PROGRESS NOTES
Patient no show show for scheduled wound new evaluation 05/10/24.  Called patient listed phone number. Reached a [employer] call screening tool, when this RN left name and name of department, message stated that patient is not available.  All future appointments canceled.

## 2024-06-03 NOTE — NUR
PT MEDICATED AS ORDERED. UA COLLECTED AND SENT TO THE LAB. PT C/O MID BACK PAIN 
AFTER STRAINING BACK A FEW DAYS AGO AND ALSO INCREASING BODY ACHES, FEVERS, 
CHILLS, AND SOB.  FRIEND OF A FRIEND HAD COVID. Population Health Chart Review & Patient Outreach Details      Additional [facility] Notes:    Non-compliant report chart audits for COLON CANCER SCREENING. Chart review completed for HM test overdue (mammograms, Colonoscopies, pap smears, DM labs, and/or EYE EXAMs)      Care Everywhere and media, updates requested and reviewed.                Updates Requested / Reviewed:      Care Everywhere, , External Sources: [place], and Care Team Updated         Health Maintenance Topics Overdue:      [facility] Score: 2     Cervical Cancer Screening  Colon Cancer Screening    Tetanus Vaccine  Shingles/Zoster Vaccine  RSV Vaccine                  Health Maintenance Topic(s) Outreach Outcomes & Actions Taken:    Colorectal Cancer Screening - Outreach Outcomes & Actions Taken  : External Records Uploaded, Care Team Updated, & History Updated if Applicable

## 2024-06-11 ENCOUNTER — OFFICE VISIT (OUTPATIENT)
Dept: URGENT CARE | Facility: CLINIC | Age: 39
End: 2024-06-11
Payer: MEDICAID

## 2024-06-11 VITALS
TEMPERATURE: 97.5 F | HEART RATE: 100 BPM | BODY MASS INDEX: 25.76 KG/M2 | WEIGHT: 184 LBS | HEIGHT: 71 IN | RESPIRATION RATE: 18 BRPM | OXYGEN SATURATION: 99 % | SYSTOLIC BLOOD PRESSURE: 134 MMHG | DIASTOLIC BLOOD PRESSURE: 92 MMHG

## 2024-06-11 DIAGNOSIS — K04.7 DENTAL INFECTION: ICD-10-CM

## 2024-06-11 PROCEDURE — 3080F DIAST BP >= 90 MM HG: CPT | Performed by: PHYSICIAN ASSISTANT

## 2024-06-11 PROCEDURE — 3075F SYST BP GE 130 - 139MM HG: CPT | Performed by: PHYSICIAN ASSISTANT

## 2024-06-11 PROCEDURE — 99214 OFFICE O/P EST MOD 30 MIN: CPT | Performed by: PHYSICIAN ASSISTANT

## 2024-06-11 RX ORDER — NAPROXEN 500 MG/1
500 TABLET ORAL 2 TIMES DAILY WITH MEALS
Qty: 30 TABLET | Refills: 0 | Status: SHIPPED
Start: 2024-06-11

## 2024-06-11 RX ORDER — CABOTEGRAVIR AND RILPIVIRINE 600-900/3
KIT INTRAMUSCULAR
COMMUNITY
Start: 2024-04-10

## 2024-06-11 RX ORDER — AMOXICILLIN AND CLAVULANATE POTASSIUM 875; 125 MG/1; MG/1
1 TABLET, FILM COATED ORAL 2 TIMES DAILY
Qty: 20 TABLET | Refills: 0 | Status: SHIPPED
Start: 2024-06-11 | End: 2024-06-21

## 2024-06-11 ASSESSMENT — ENCOUNTER SYMPTOMS
HEADACHES: 1
GASTROINTESTINAL NEGATIVE: 1
FEVER: 0
SINUS PRESSURE: 1
RESPIRATORY NEGATIVE: 1
SORE THROAT: 0
CHILLS: 0
MUSCULOSKELETAL NEGATIVE: 1
CARDIOVASCULAR NEGATIVE: 1

## 2024-06-11 ASSESSMENT — FIBROSIS 4 INDEX: FIB4 SCORE: 0.49

## 2024-06-11 NOTE — LETTER
June 11, 2024         Patient: Sy Sanz   YOB: 1985   Date of Visit: 6/11/2024           To Whom it May Concern:    Sy Sanz was seen in my clinic on 6/11/2024. Please excuse any absences from work this week due to acute condition.      If you have any questions or concerns, please don't hesitate to call.        Sincerely,           Calvin Davidson P.A.-C.  Electronically Signed

## 2024-06-11 NOTE — PROGRESS NOTES
Subjective     Sy Sanz is a very pleasant 39 y.o. male who presents with Other (X3 days Tooth pain on left side/ whole mouth hurts goes into ear.)            History of dental infection dental abscess.  Today patient chest pain, shortness of breath or neck pain.    Dental Pain   This is a new problem. The current episode started in the past 7 days. The problem occurs constantly. The problem has been gradually worsening. Associated symptoms include facial pain, sinus pressure and thermal sensitivity. Pertinent negatives include no difficulty swallowing or fever. He has tried NSAIDs for the symptoms. The treatment provided mild relief.         PMH:  has a past medical history of Asthma, Chickenpox, and HIV (human immunodeficiency virus infection) ([facility]).    He has no past medical history of [place] measles.  MEDS:   Current Outpatient Medications:     CABENUVA 600 & 900 MG/3ML Suspension Extended Release, INJECT ONE DOSE (600 MG-900 MG) INTRAMUSCULARLY ONCE A MONTH FOR TWO MONTHS, Disp: , Rfl:     traZODone (DESYREL) 50 MG Tab, Take 50 mg by mouth every evening., Disp: , Rfl:     hydrOXYzine pamoate (VISTARIL) 50 MG Cap, Take 50 mg by mouth 3 times a day as needed for Itching., Disp: , Rfl:     olanzapine (ZYPREXA) 10 MG tablet, Take 10 mg by mouth every evening., Disp: , Rfl:     tadalafil (CIALIS) 5 MG tablet, Take 5 mg by mouth 1 time a day as needed for Erectile Dysfunction., Disp: , Rfl:     cyclobenzaprine (FLEXERIL) 10 mg Tab, Take 10 mg by mouth at bedtime as needed for Muscle Spasms., Disp: , Rfl:     escitalopram (LEXAPRO) 10 MG Tab, Take 10 mg by mouth every day., Disp: , Rfl:     amphetamine-dextroamphetamine (ADDERALL) 10 MG Tab, Take 10 mg by mouth in the morning, at noon, and at bedtime., Disp: , Rfl:     albuterol 108 (90 Base) MCG/ACT Aero Soln inhalation aerosol, Inhale 2 Puffs every 6 hours as needed for Shortness of Breath., Disp: 8.5 g, Rfl: 0    methocarbamol (ROBAXIN) 750 MG  "Tab, Take 750 mg by mouth 1 time a day as needed (Muscle Pain)., Disp: , Rfl:     ibuprofen (MOTRIN) 800 MG Tab, Take 1 Tablet by mouth every 8 hours as needed for Moderate Pain., Disp: 30 Tablet, Rfl: 0    zolpidem (AMBIEN) 5 MG Tab, Take 5 mg by mouth at bedtime as needed for Sleep., Disp: , Rfl:   ALLERGIES:   Allergies   Allergen Reactions    Morphine Anaphylaxis     Pt turned \"red\" and was put into an \"ice bath\" after receiving morphine at age 3     Benadryl Allergy      Pt gets \"amped up, jittery and cannot sleep\"    Diphenhydramine Anxiety     SURGHX:   Past Surgical History:   Procedure Laterality Date    APPENDECTOMY LAPAROSCOPIC  7/7/2018    Procedure: APPENDECTOMY LAPAROSCOPIC;  Surgeon: Avinash Hills M.D.;  Location: SURGERY [facility];  Service: General    CLUB FOOT RELEASE Bilateral      SOCHX:  reports that he has been smoking cigarettes. He has a 1 pack-year smoking history. He has never used smokeless tobacco. He reports current drug use. Drug: Inhaled. He reports that he does not drink alcohol.  FH: family history includes Sleep Apnea in his father and mother.      Review of Systems   Constitutional:  Negative for chills and fever.   HENT:  Positive for sinus pressure. Negative for congestion, ear pain and sore throat.         Dental infection   Respiratory: Negative.     Cardiovascular: Negative.    Gastrointestinal: Negative.    Musculoskeletal: Negative.    Neurological:  Positive for headaches.       Medications, Allergies, and current problem list reviewed today in Epic           Objective     BP (!) 134/92   Pulse 100   Temp 36.4 °C (97.5 °F) (Temporal)   Resp 18   Ht 1.803 m (5' 11\")   Wt 83.5 kg (184 lb)   SpO2 99%   BMI 25.66 kg/m²      Physical Exam  Vitals and nursing note reviewed.   Constitutional:       General: He is not in acute distress.     Appearance: Normal appearance. He is well-developed. He is not diaphoretic.   HENT:      Head: Normocephalic and atraumatic.     "  Jaw: There is normal jaw occlusion. No trismus, tenderness, swelling, pain on movement or malocclusion.      Right Ear: Hearing, tympanic membrane, ear canal and external ear normal.      Left Ear: Hearing, tympanic membrane, ear canal and external ear normal.      Nose: Nose normal.      Mouth/Throat:      Mouth: Mucous membranes are moist.      Dentition: Abnormal dentition. Dental tenderness, gingival swelling and dental caries present. No dental abscesses.      Pharynx: Oropharynx is clear. Uvula midline.        Comments: Overlying left-sided facial pain and swelling  Eyes:      General:         Right eye: No discharge.         Left eye: No discharge.      Conjunctiva/sclera: Conjunctivae normal.   Cardiovascular:      Rate and Rhythm: Normal rate and regular rhythm.      Pulses: Normal pulses.      Heart sounds: Normal heart sounds. No murmur heard.  Pulmonary:      Effort: Pulmonary effort is normal. No respiratory distress.      Breath sounds: Normal breath sounds. No stridor. No wheezing, rhonchi or rales.   Musculoskeletal:      Cervical back: Normal range of motion and neck supple. No rigidity or tenderness.      Right lower leg: No edema.      Left lower leg: No edema.   Lymphadenopathy:      Cervical: No cervical adenopathy.   Skin:     General: Skin is warm and dry.      Findings: No rash.   Neurological:      General: No focal deficit present.      Mental Status: He is alert and oriented to person, place, and time. Mental status is at baseline.   Psychiatric:         Mood and Affect: Mood normal.         Behavior: Behavior normal.         Thought Content: Thought content normal.         Judgment: Judgment normal.                             Assessment & Plan        1. Dental infection  amoxicillin-clavulanate (AUGMENTIN) 875-125 MG Tab    naproxen (NAPROSYN) 500 MG Tab        This is a very pleasant 39-year-old male presenting with left upper dental pain, gingival swelling.  Pain is increasing and  spreading to the face and jaw.  He denies headache, dizziness, fever, neck pain, shortness of breath, chest pain or vomiting.  History poor dentition, dental infection, and dental abscess.  Currently does not have a dentist.  Vital signs appropriate.  Left superior anterior gingival swelling with dental tenderness and multiple cracked missing teeth with exposed roots.  Overlying facial pain and cervical adenopathy.  However no obvious discernible abscess.  Full passive and active range of motion of neck without rigidity.  No jaw malocclusion or orbital tenderness.  Speaking complete sentences and tolerating oral secretions.  Cardiopulmonary auscultation at baseline.      I personally reviewed prior external notes and test results pertinent to today's visit. Return to clinic or go to ED if symptoms worsen or persist. Red flag symptoms and indications for ED discussed at length. Patient/Parent/Guardian voices understanding.  AVS with post-visit instructions printed and provided or given verbally.  Follow-up with your primary care provider in 3-5 days. All side effects and potential interactions of prescribed medication discussed including allergic response, GI upset, tendon injury, rash, sedation, OCP effectiveness, etc.    Please note that this dictation was created using voice recognition software. I have made every reasonable attempt to correct obvious errors, but I expect that there are errors of grammar and possibly content that I did not discover before finalizing the note.

## 2024-06-12 ENCOUNTER — HOSPITAL ENCOUNTER (EMERGENCY)
Facility: MEDICAL CENTER | Age: 39
End: 2024-06-12
Attending: EMERGENCY MEDICINE
Payer: MEDICAID

## 2024-06-12 VITALS
SYSTOLIC BLOOD PRESSURE: 130 MMHG | HEART RATE: 96 BPM | DIASTOLIC BLOOD PRESSURE: 90 MMHG | HEIGHT: 71 IN | WEIGHT: 182.98 LBS | RESPIRATION RATE: 16 BRPM | TEMPERATURE: 97.2 F | OXYGEN SATURATION: 98 % | BODY MASS INDEX: 25.62 KG/M2

## 2024-06-12 DIAGNOSIS — L03.211 FACIAL CELLULITIS: ICD-10-CM

## 2024-06-12 PROCEDURE — A9270 NON-COVERED ITEM OR SERVICE: HCPCS | Mod: UD | Performed by: EMERGENCY MEDICINE

## 2024-06-12 PROCEDURE — 99283 EMERGENCY DEPT VISIT LOW MDM: CPT

## 2024-06-12 PROCEDURE — 700102 HCHG RX REV CODE 250 W/ 637 OVERRIDE(OP): Mod: UD | Performed by: EMERGENCY MEDICINE

## 2024-06-12 RX ORDER — OXYCODONE HYDROCHLORIDE 5 MG/1
5 TABLET ORAL EVERY 4 HOURS PRN
Qty: 10 TABLET | Refills: 0 | Status: SHIPPED | OUTPATIENT
Start: 2024-06-12 | End: 2024-06-15

## 2024-06-12 RX ORDER — OXYCODONE HYDROCHLORIDE 5 MG/1
5 TABLET ORAL ONCE
Status: COMPLETED | OUTPATIENT
Start: 2024-06-12 | End: 2024-06-12

## 2024-06-12 RX ADMIN — OXYCODONE HYDROCHLORIDE 5 MG: 5 TABLET ORAL at 13:48

## 2024-06-12 ASSESSMENT — FIBROSIS 4 INDEX: FIB4 SCORE: 0.49

## 2024-06-12 ASSESSMENT — PAIN DESCRIPTION - PAIN TYPE: TYPE: ACUTE PAIN

## 2024-06-12 NOTE — ED PROVIDER NOTES
ER Provider Note    Scribed for Martin Del Angel M.D. by Karina Hwang. 6/12/2024   1:21 PM    Primary Care Provider: Tammi Underwood M.D.    CHIEF COMPLAINT  Chief Complaint   Patient presents with    Dental Pain     Left upper x3 days    Facial Swelling     Left side     EXTERNAL RECORDS REVIEWED  Inpatient Notes The patient has a history of HIV and methamphetamine abuse. The patient was hospitalized July 2022 for a dental abscess.     HPI/ROS  LIMITATION TO HISTORY   Select: : None  OUTSIDE HISTORIAN(S):  None    Sy Sanz is a 39 y.o. male who presents to the ED for evaluation of dental pain onset 3 days ago. The patient reports that he began having dental pain 3 days ago. He states that he presented to urgent care yesterday and was prescribed amoxicillin. He has taken 4 doses since then but his pain has been increasing. He describes his pain as located on the upper left side and radiating into his jaw and head. He has associated facial swelling. He has tried to use Tylenol and Ibuprofen for his pain with little alleviation. He adds that he was hospitalized for an abscess July 2022 and required an oral surgeon for drainage of the abscess. No alleviating or exacerbating factors were noted. He is allergic to morphine and benadryl.    PAST MEDICAL HISTORY  Past Medical History:   Diagnosis Date    Asthma     Chickenpox     HIV (human immunodeficiency virus infection) ([facility])        SURGICAL HISTORY  Past Surgical History:   Procedure Laterality Date    APPENDECTOMY LAPAROSCOPIC  7/7/2018    Procedure: APPENDECTOMY LAPAROSCOPIC;  Surgeon: Avinash Hills M.D.;  Location: SURGERY [facility];  Service: General    CLUB FOOT RELEASE Bilateral        FAMILY HISTORY  Family History   Problem Relation Age of Onset    Sleep Apnea Mother     Sleep Apnea Father        SOCIAL HISTORY   reports that he has been smoking cigarettes. He has a 1 pack-year smoking history. He has never used smokeless  "tobacco. He reports current drug use. Drug: Inhaled. He reports that he does not drink alcohol.    CURRENT MEDICATIONS  Current Outpatient Medications   Medication Instructions    albuterol 108 (90 Base) MCG/ACT Aero Soln inhalation aerosol 2 Puffs, Inhalation, EVERY 6 HOURS PRN    amoxicillin-clavulanate (AUGMENTIN) 875-125 MG Tab 1 Tablet, Oral, 2 TIMES DAILY    amphetamine-dextroamphetamine (ADDERALL) 10 MG Tab 10 mg, Oral, 3 times daily    CABENUVA 600 & 900 MG/3ML Suspension Extended Release INJECT ONE DOSE (600 MG-900 MG) INTRAMUSCULARLY ONCE A MONTH FOR TWO MONTHS    cyclobenzaprine (FLEXERIL) 10 mg, Oral, NIGHTLY PRN    escitalopram (LEXAPRO) 10 mg, Oral, DAILY    hydrOXYzine pamoate (VISTARIL) 50 mg, Oral, 3 TIMES DAILY PRN    ibuprofen (MOTRIN) 800 mg, Oral, EVERY 8 HOURS PRN    methocarbamol (ROBAXIN) 750 mg, Oral, 1 TIME DAILY PRN    naproxen (NAPROSYN) 500 mg, Oral, 2 TIMES DAILY WITH MEALS    olanzapine (ZYPREXA) 10 mg, Oral, NIGHTLY    tadalafil (CIALIS) 5 mg, Oral, 1 TIME DAILY PRN    traZODone (DESYREL) 50 mg, Oral, NIGHTLY    zolpidem (AMBIEN) 5 mg, Oral, NIGHTLY PRN        ALLERGIES  Allergies   Allergen Reactions    Morphine Anaphylaxis     Pt turned \"red\" and was put into an \"ice bath\" after receiving morphine at age 3     Benadryl Allergy      Pt gets \"amped up, jittery and cannot sleep\"    Diphenhydramine Anxiety        PHYSICAL EXAM  BP (!) 157/84   Pulse (!) 103   Temp 36 °C (96.8 °F) (Temporal)   Resp 16   Ht 1.803 m (5' 11\")   Wt 83 kg (182 lb 15.7 oz)   SpO2 99%   BMI 25.52 kg/m²      Constitutional:  Mild distress   Dental: Left maxillary with tenderness to palpation, soft tissue swelling, multiple rotted out teeth, no abscess  Skin: Warm, Dry, No erythema, No rash.    Neurologic: Alert and oriented     COURSE & MEDICAL DECISION MAKING     INITIAL ASSESSMENT, COURSE AND PLAN  Differential diagnoses include but not limited to: dental carries, infection     Care Narrative: Dental " infection, he will take his amoxicillin 3 times a day will, will give the patient some pain medicines, return in the next 2 days if not improved.    1:21 PM - Patient was evaluated at bedside. The patient will be medicated with Roxicodone for his symptoms. Patient verbalizes understanding and support with my plan of care. I instructed him to continue his antibiotics and explained that his symptoms may first worsen before they improve. Discussed plan for discharge, including plan for follow-up, and informed them to return to the [facility] ED with any new or worsening symptoms. Patient was given the opportunity for questions, and I addressed all questions or concerns. He is stable for discharge at this time. Patient verbalizes understanding and support with my plan for discharge.      DISPOSITION AND DISCUSSIONS    I have discussed management of the patient with the following physicians and JOHNNIE's:  None    Discussion of management with other QHP or appropriate source(s): None     Escalation of care considered, and ultimately not performed: diagnostic imaging.    Barriers to care at this time, including but not limited to:  None .     Decision tools and prescription drugs considered including, but not limited to: Pain Medications   .    I reviewed prescription monitoring program for patient's narcotic use before prescribing a scheduled drug.The patient will not drink alcohol nor drive with prescribed medications. The patient will return for new or worsening symptoms and is stable at the time of discharge.    The patient is referred to a primary physician for blood pressure management, diabetic screening, and for all other preventative health concerns.    In prescribing controlled substances to this patient, I certify that I have obtained and reviewed the medical history of yS Sanz. I have also made a good sosa effort to obtain applicable records from other providers who have treated the patient and  records did not demonstrate any increased risk of substance abuse that would prevent me from prescribing controlled substances.     I have conducted a physical exam and documented it. I have reviewed Mr. Sanz’s prescription history as maintained by the [place] Prescription Monitoring Program.     I have assessed the patient’s risk for abuse, dependency, and addiction using the validated Opioid Risk Tool available at https://www.mdcalc.com/awpjkl-mvug-bwwt-ort-narcotic-abuse.     Given the above, I believe the benefits of controlled substance therapy outweigh the risks. The reasons for prescribing controlled substances include non-narcotic, oral analgesic alternatives have been inadequate for pain control. Accordingly, I have discussed the risk and benefits, treatment plan, and alternative therapies with the patient.      DISPOSITION:  Patient will be discharged home in stable condition.    FOLLOW UP:  [facility], Emergency Dept  [address] [place] [id number] [phone]    If symptoms worsen, In 1-2 days for recheck if not improved.      OUTPATIENT MEDICATIONS:  New Prescriptions    OXYCODONE IMMEDIATE-RELEASE (ROXICODONE) 5 MG TAB    Take 1 Tablet by mouth every four hours as needed for Severe Pain for up to 3 days.        FINAL DIAGNOSIS  1. Facial cellulitis         Karina WISE (Triny), am scribing for, and in the presence of, Martin Del Angel M.D..    Electronically signed by: Karina Hwang (Triny), 6/12/2024    Martin WISE M.D. personally performed the services described in this documentation, as scribed by Karina Hwang in my presence, and it is both accurate and complete.      The note accurately reflects work and decisions made by me.  Martin Del Angel M.D.  6/12/2024  3:22 PM

## 2024-06-12 NOTE — ED NOTES
Patient provided more water and shown to the exit; ambulated with steady gait; respirations patti and unlabored; A&Ox4

## 2024-06-12 NOTE — ED TRIAGE NOTES
Chief Complaint   Patient presents with    Dental Pain     Left upper x3 days    Facial Swelling     Left side     Ambulatory to triage for above. Began course of amoxicillin yesterday, total of 4 doses with no relief thus far. Appears uncomfortable in triage. Explained triage process, to waiting room. Asked to inform RN if questions or concerns arise.

## 2024-06-12 NOTE — ED NOTES
"Patient roomed to [facility]; ambulated with steady gait; agree with triage note; patient states he has taken 500mg of Tylenol, 600mg of Ibuprofen, and extra of his ambien to try agd sleep with no relief; he states he has \"tried a lot of stuff and nothing has helped\".  "

## 2024-06-12 NOTE — ED NOTES
Discharge education provided by ERP. Discharge paperwork reviewed with patient. Prescription to be picked up by patient. All questions answered. All belongings with patient. Patient requested a few minutes to call friend before leaving.

## 2024-06-12 NOTE — ED NOTES
Patient medicated by assist RN; patient is not driving self home and signed/understands all rights/requirements/risks entailed within controlled substance form.

## 2024-06-23 ENCOUNTER — OFFICE VISIT (OUTPATIENT)
Dept: URGENT CARE | Facility: CLINIC | Age: 39
End: 2024-06-23
Payer: MEDICAID

## 2024-06-23 ENCOUNTER — PHARMACY VISIT (OUTPATIENT)
Dept: PHARMACY | Facility: MEDICAL CENTER | Age: 39
End: 2024-06-23
Payer: COMMERCIAL

## 2024-06-23 ENCOUNTER — HOSPITAL ENCOUNTER (EMERGENCY)
Facility: MEDICAL CENTER | Age: 39
End: 2024-06-23
Attending: EMERGENCY MEDICINE
Payer: MEDICAID

## 2024-06-23 ENCOUNTER — APPOINTMENT (OUTPATIENT)
Dept: RADIOLOGY | Facility: MEDICAL CENTER | Age: 39
End: 2024-06-23
Payer: MEDICAID

## 2024-06-23 VITALS
OXYGEN SATURATION: 98 % | WEIGHT: 179.6 LBS | SYSTOLIC BLOOD PRESSURE: 112 MMHG | DIASTOLIC BLOOD PRESSURE: 78 MMHG | BODY MASS INDEX: 25.15 KG/M2 | TEMPERATURE: 97 F | HEART RATE: 118 BPM | HEIGHT: 71 IN

## 2024-06-23 VITALS
HEART RATE: 105 BPM | OXYGEN SATURATION: 93 % | SYSTOLIC BLOOD PRESSURE: 136 MMHG | RESPIRATION RATE: 18 BRPM | BODY MASS INDEX: 25.06 KG/M2 | DIASTOLIC BLOOD PRESSURE: 79 MMHG | HEIGHT: 71 IN | TEMPERATURE: 97.2 F | WEIGHT: 179 LBS

## 2024-06-23 DIAGNOSIS — R06.02 SHORTNESS OF BREATH: ICD-10-CM

## 2024-06-23 DIAGNOSIS — R52 BODY ACHES: ICD-10-CM

## 2024-06-23 DIAGNOSIS — R07.9 CHEST PAIN, UNSPECIFIED TYPE: ICD-10-CM

## 2024-06-23 DIAGNOSIS — Z71.6 TOBACCO ABUSE COUNSELING: ICD-10-CM

## 2024-06-23 DIAGNOSIS — R00.0 TACHYCARDIA: ICD-10-CM

## 2024-06-23 DIAGNOSIS — R05.1 ACUTE COUGH: ICD-10-CM

## 2024-06-23 DIAGNOSIS — M79.602 ARM PAIN, LEFT: ICD-10-CM

## 2024-06-23 DIAGNOSIS — Z72.0 TOBACCO ABUSE: ICD-10-CM

## 2024-06-23 DIAGNOSIS — F15.11 HISTORY OF METHAMPHETAMINE ABUSE (HCC): ICD-10-CM

## 2024-06-23 DIAGNOSIS — B20 HISTORY OF HIV INFECTION (HCC): ICD-10-CM

## 2024-06-23 DIAGNOSIS — M62.838 MUSCLE SPASM: Primary | ICD-10-CM

## 2024-06-23 DIAGNOSIS — R68.89 FLU-LIKE SYMPTOMS: ICD-10-CM

## 2024-06-23 DIAGNOSIS — J06.9 UPPER RESPIRATORY TRACT INFECTION, UNSPECIFIED TYPE: ICD-10-CM

## 2024-06-23 LAB
ALBUMIN SERPL BCP-MCNC: 3.8 G/DL (ref 3.2–4.9)
ALBUMIN/GLOB SERPL: 1.2 G/DL
ALP SERPL-CCNC: 124 U/L (ref 30–99)
ALT SERPL-CCNC: 38 U/L (ref 2–50)
ANION GAP SERPL CALC-SCNC: 12 MMOL/L (ref 7–16)
AST SERPL-CCNC: 29 U/L (ref 12–45)
BASOPHILS # BLD AUTO: 0.6 % (ref 0–1.8)
BASOPHILS # BLD: 0.06 K/UL (ref 0–0.12)
BILIRUB SERPL-MCNC: 0.6 MG/DL (ref 0.1–1.5)
BUN SERPL-MCNC: 10 MG/DL (ref 8–22)
CALCIUM ALBUM COR SERPL-MCNC: 9.2 MG/DL (ref 8.5–10.5)
CALCIUM SERPL-MCNC: 9 MG/DL (ref 8.5–10.5)
CHLORIDE SERPL-SCNC: 101 MMOL/L (ref 96–112)
CO2 SERPL-SCNC: 21 MMOL/L (ref 20–33)
CREAT SERPL-MCNC: 1.07 MG/DL (ref 0.5–1.4)
EKG IMPRESSION: NORMAL
EOSINOPHIL # BLD AUTO: 0.09 K/UL (ref 0–0.51)
EOSINOPHIL NFR BLD: 0.9 % (ref 0–6.9)
ERYTHROCYTE [DISTWIDTH] IN BLOOD BY AUTOMATED COUNT: 40.4 FL (ref 35.9–50)
FLUAV RNA SPEC QL NAA+PROBE: NEGATIVE
FLUBV RNA SPEC QL NAA+PROBE: NEGATIVE
GFR SERPLBLD CREATININE-BSD FMLA CKD-EPI: 90 ML/MIN/1.73 M 2
GLOBULIN SER CALC-MCNC: 3.3 G/DL (ref 1.9–3.5)
GLUCOSE SERPL-MCNC: 100 MG/DL (ref 65–99)
HCT VFR BLD AUTO: 43.5 % (ref 42–52)
HGB BLD-MCNC: 15.3 G/DL (ref 14–18)
IMM GRANULOCYTES # BLD AUTO: 0.06 K/UL (ref 0–0.11)
IMM GRANULOCYTES NFR BLD AUTO: 0.6 % (ref 0–0.9)
LYMPHOCYTES # BLD AUTO: 1.91 K/UL (ref 1–4.8)
LYMPHOCYTES NFR BLD: 18.6 % (ref 22–41)
MCH RBC QN AUTO: 30.9 PG (ref 27–33)
MCHC RBC AUTO-ENTMCNC: 35.2 G/DL (ref 32.3–36.5)
MCV RBC AUTO: 87.9 FL (ref 81.4–97.8)
MONOCYTES # BLD AUTO: 0.84 K/UL (ref 0–0.85)
MONOCYTES NFR BLD AUTO: 8.2 % (ref 0–13.4)
NEUTROPHILS # BLD AUTO: 7.33 K/UL (ref 1.82–7.42)
NEUTROPHILS NFR BLD: 71.1 % (ref 44–72)
NRBC # BLD AUTO: 0 K/UL
NRBC BLD-RTO: 0 /100 WBC (ref 0–0.2)
PLATELET # BLD AUTO: 364 K/UL (ref 164–446)
PMV BLD AUTO: 8.1 FL (ref 9–12.9)
POTASSIUM SERPL-SCNC: 3.9 MMOL/L (ref 3.6–5.5)
PROT SERPL-MCNC: 7.1 G/DL (ref 6–8.2)
RBC # BLD AUTO: 4.95 M/UL (ref 4.7–6.1)
RSV RNA SPEC QL NAA+PROBE: NEGATIVE
SARS-COV-2 RNA RESP QL NAA+PROBE: NOTDETECTED
SODIUM SERPL-SCNC: 134 MMOL/L (ref 135–145)
TROPONIN T SERPL-MCNC: 7 NG/L (ref 6–19)
WBC # BLD AUTO: 10.3 K/UL (ref 4.8–10.8)

## 2024-06-23 PROCEDURE — A9270 NON-COVERED ITEM OR SERVICE: HCPCS | Mod: UD | Performed by: EMERGENCY MEDICINE

## 2024-06-23 PROCEDURE — 80053 COMPREHEN METABOLIC PANEL: CPT

## 2024-06-23 PROCEDURE — 93005 ELECTROCARDIOGRAM TRACING: CPT | Performed by: EMERGENCY MEDICINE

## 2024-06-23 PROCEDURE — 700102 HCHG RX REV CODE 250 W/ 637 OVERRIDE(OP): Mod: UD | Performed by: EMERGENCY MEDICINE

## 2024-06-23 PROCEDURE — 85025 COMPLETE CBC W/AUTO DIFF WBC: CPT

## 2024-06-23 PROCEDURE — 99406 BEHAV CHNG SMOKING 3-10 MIN: CPT

## 2024-06-23 PROCEDURE — 84484 ASSAY OF TROPONIN QUANT: CPT

## 2024-06-23 PROCEDURE — 71045 X-RAY EXAM CHEST 1 VIEW: CPT

## 2024-06-23 PROCEDURE — 36415 COLL VENOUS BLD VENIPUNCTURE: CPT

## 2024-06-23 PROCEDURE — 93005 ELECTROCARDIOGRAM TRACING: CPT

## 2024-06-23 PROCEDURE — 0241U HCHG SARS-COV-2 COVID-19 NFCT DS RESP RNA 4 TRGT ED POC: CPT

## 2024-06-23 PROCEDURE — 99284 EMERGENCY DEPT VISIT MOD MDM: CPT

## 2024-06-23 PROCEDURE — 99214 OFFICE O/P EST MOD 30 MIN: CPT | Performed by: NURSE PRACTITIONER

## 2024-06-23 PROCEDURE — 3074F SYST BP LT 130 MM HG: CPT | Performed by: NURSE PRACTITIONER

## 2024-06-23 PROCEDURE — 3078F DIAST BP <80 MM HG: CPT | Performed by: NURSE PRACTITIONER

## 2024-06-23 PROCEDURE — RXMED WILLOW AMBULATORY MEDICATION CHARGE: Performed by: EMERGENCY MEDICINE

## 2024-06-23 PROCEDURE — 93000 ELECTROCARDIOGRAM COMPLETE: CPT | Performed by: NURSE PRACTITIONER

## 2024-06-23 RX ORDER — ACETAMINOPHEN 500 MG
1000 TABLET ORAL ONCE
Status: COMPLETED | OUTPATIENT
Start: 2024-06-23 | End: 2024-06-23

## 2024-06-23 RX ORDER — CYCLOBENZAPRINE HCL 10 MG
10 TABLET ORAL ONCE
Status: COMPLETED | OUTPATIENT
Start: 2024-06-23 | End: 2024-06-23

## 2024-06-23 RX ORDER — BENZONATATE 100 MG/1
100 CAPSULE ORAL 3 TIMES DAILY PRN
Qty: 15 CAPSULE | Refills: 0 | Status: SHIPPED | OUTPATIENT
Start: 2024-06-23

## 2024-06-23 RX ORDER — ACETAMINOPHEN 500 MG
1000 TABLET ORAL EVERY 6 HOURS PRN
Qty: 20 TABLET | Refills: 0 | Status: SHIPPED | OUTPATIENT
Start: 2024-06-23 | End: 2024-06-27

## 2024-06-23 RX ORDER — NICOTINE 21 MG/24HR
1 PATCH, TRANSDERMAL 24 HOURS TRANSDERMAL EVERY 24 HOURS
Qty: 30 PATCH | Refills: 0 | Status: SHIPPED | OUTPATIENT
Start: 2024-06-23

## 2024-06-23 RX ORDER — IBUPROFEN 800 MG/1
800 TABLET ORAL EVERY 8 HOURS PRN
Qty: 9 TABLET | Refills: 0 | Status: SHIPPED | OUTPATIENT
Start: 2024-06-23 | End: 2024-06-26

## 2024-06-23 RX ORDER — CYCLOBENZAPRINE HCL 10 MG
10 TABLET ORAL 3 TIMES DAILY PRN
Qty: 10 TABLET | Refills: 0 | Status: SHIPPED | OUTPATIENT
Start: 2024-06-23 | End: 2024-06-27

## 2024-06-23 RX ADMIN — CYCLOBENZAPRINE 10 MG: 10 TABLET, FILM COATED ORAL at 19:00

## 2024-06-23 RX ADMIN — ACETAMINOPHEN 1000 MG: 500 TABLET, FILM COATED ORAL at 19:01

## 2024-06-23 RX ADMIN — IBUPROFEN 800 MG: 200 TABLET, FILM COATED ORAL at 19:00

## 2024-06-23 ASSESSMENT — PAIN DESCRIPTION - PAIN TYPE
TYPE: ACUTE PAIN
TYPE: ACUTE PAIN

## 2024-06-23 ASSESSMENT — ENCOUNTER SYMPTOMS
NECK PAIN: 1
COUGH: 1

## 2024-06-23 ASSESSMENT — LIFESTYLE VARIABLES: DO YOU DRINK ALCOHOL: NO

## 2024-06-23 ASSESSMENT — FIBROSIS 4 INDEX
FIB4 SCORE: 0.49
FIB4 SCORE: 0.49

## 2024-06-24 NOTE — ED TRIAGE NOTES
Chief Complaint   Patient presents with    Chest Pain     Patient woke up yesterday morning a felt like he had been punched in the chest, 8/10 sternal burning pain.     Cough     X2 days    Shoulder Pain     X 2 months, left shoulder pain that radiates into arm      Pt in wheelchair to triage for above complaint.      Pt is alert/oriented and follows commands. Pt speaking in full sentences and responds appropriately to questions. No acute distress noted in triage and respirations are even and unlabored.     Pt placed in hallway for blood draw and educated on triage process. Pt encouraged to alert staff for any changes in condition.
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
## 2024-06-24 NOTE — ED NOTES
Medicated per MAR.  Assisted to position gurdat for comfort.  Denies any other needs.  Call light in reach.

## 2024-06-24 NOTE — ED NOTES
Pt. To room from lobby via w/c after getting lab draw at this time.  Instructed to change into gown.  States central CP that radiates through entire upper chest and down through left arm x 2 days.  Sent from urgent care.

## 2024-06-24 NOTE — DISCHARGE INSTRUCTIONS
I am sending you pain and prescription medications for your cough, muscle spasms.  Please  the medications and take them as directed.  Regarding her tobacco abuse, I am sending you nicotine patches to help.  Your workup here was very reassuring and negative.  I do think you likely have a viral upper respiratory tract infection causing her symptoms.  You will need to follow-up with your primary team for further evaluation and treatment.  If you have any worsening symptoms or concerns, do not hesitate to return.  Thank you for coming in today.

## 2024-07-01 ENCOUNTER — PHARMACY VISIT (OUTPATIENT)
Dept: PHARMACY | Facility: MEDICAL CENTER | Age: 39
End: 2024-07-01
Payer: COMMERCIAL

## 2024-07-01 ENCOUNTER — OFFICE VISIT (OUTPATIENT)
Dept: URGENT CARE | Facility: CLINIC | Age: 39
End: 2024-07-01
Payer: MEDICAID

## 2024-07-01 VITALS
SYSTOLIC BLOOD PRESSURE: 94 MMHG | OXYGEN SATURATION: 96 % | WEIGHT: 183.9 LBS | DIASTOLIC BLOOD PRESSURE: 60 MMHG | TEMPERATURE: 97 F | HEIGHT: 71 IN | BODY MASS INDEX: 25.75 KG/M2 | RESPIRATION RATE: 21 BRPM | HEART RATE: 100 BPM

## 2024-07-01 DIAGNOSIS — J22 LRTI (LOWER RESPIRATORY TRACT INFECTION): ICD-10-CM

## 2024-07-01 DIAGNOSIS — M62.830 BACK SPASM: ICD-10-CM

## 2024-07-01 DIAGNOSIS — R05.1 ACUTE COUGH: ICD-10-CM

## 2024-07-01 PROCEDURE — 3078F DIAST BP <80 MM HG: CPT | Performed by: NURSE PRACTITIONER

## 2024-07-01 PROCEDURE — 3074F SYST BP LT 130 MM HG: CPT | Performed by: NURSE PRACTITIONER

## 2024-07-01 PROCEDURE — RXMED WILLOW AMBULATORY MEDICATION CHARGE: Performed by: NURSE PRACTITIONER

## 2024-07-01 PROCEDURE — 99214 OFFICE O/P EST MOD 30 MIN: CPT | Performed by: NURSE PRACTITIONER

## 2024-07-01 RX ORDER — DEXTROMETHORPHAN HYDROBROMIDE AND PROMETHAZINE HYDROCHLORIDE 15; 6.25 MG/5ML; MG/5ML
5 SYRUP ORAL EVERY 4 HOURS PRN
Qty: 120 ML | Refills: 0 | Status: SHIPPED | OUTPATIENT
Start: 2024-07-01

## 2024-07-01 RX ORDER — AZITHROMYCIN 250 MG/1
TABLET, FILM COATED ORAL
Qty: 6 TABLET | Refills: 0 | Status: SHIPPED | OUTPATIENT
Start: 2024-07-01

## 2024-07-01 RX ORDER — METHYLPREDNISOLONE 4 MG/1
TABLET ORAL
Qty: 21 TABLET | Refills: 0 | Status: SHIPPED | OUTPATIENT
Start: 2024-07-01

## 2024-07-01 ASSESSMENT — FIBROSIS 4 INDEX: FIB4 SCORE: 0.52

## 2024-07-02 ASSESSMENT — ENCOUNTER SYMPTOMS
WHEEZING: 1
HEADACHES: 0
FEVER: 0
SPUTUM PRODUCTION: 0
SORE THROAT: 0
COUGH: 1
CHILLS: 0
SHORTNESS OF BREATH: 1
BACK PAIN: 1
RHINORRHEA: 0

## 2024-07-02 ASSESSMENT — COPD QUESTIONNAIRES: COPD: 0

## 2024-11-12 ENCOUNTER — OFFICE VISIT (OUTPATIENT)
Dept: URGENT CARE | Facility: CLINIC | Age: 39
End: 2024-11-12
Payer: MEDICAID

## 2024-11-12 VITALS
OXYGEN SATURATION: 95 % | SYSTOLIC BLOOD PRESSURE: 122 MMHG | HEART RATE: 95 BPM | RESPIRATION RATE: 20 BRPM | WEIGHT: 184.3 LBS | TEMPERATURE: 96.6 F | BODY MASS INDEX: 25.8 KG/M2 | HEIGHT: 71 IN | DIASTOLIC BLOOD PRESSURE: 78 MMHG

## 2024-11-12 DIAGNOSIS — K04.7 DENTAL INFECTION: ICD-10-CM

## 2024-11-12 DIAGNOSIS — J32.9 RHINOSINUSITIS: ICD-10-CM

## 2024-11-12 DIAGNOSIS — R05.1 ACUTE COUGH: ICD-10-CM

## 2024-11-12 PROCEDURE — 99213 OFFICE O/P EST LOW 20 MIN: CPT | Performed by: FAMILY MEDICINE

## 2024-11-12 PROCEDURE — 3074F SYST BP LT 130 MM HG: CPT | Performed by: FAMILY MEDICINE

## 2024-11-12 PROCEDURE — 3078F DIAST BP <80 MM HG: CPT | Performed by: FAMILY MEDICINE

## 2024-11-12 RX ORDER — DEXTROMETHORPHAN HYDROBROMIDE AND PROMETHAZINE HYDROCHLORIDE 15; 6.25 MG/5ML; MG/5ML
5 SYRUP ORAL 4 TIMES DAILY PRN
Qty: 120 ML | Refills: 0 | Status: SHIPPED | OUTPATIENT
Start: 2024-11-12

## 2024-11-12 RX ORDER — NAPROXEN 500 MG/1
500 TABLET ORAL 2 TIMES DAILY WITH MEALS
Qty: 20 TABLET | Refills: 0 | Status: SHIPPED | OUTPATIENT
Start: 2024-11-12 | End: 2024-11-22

## 2024-11-12 RX ORDER — KETOROLAC TROMETHAMINE 15 MG/ML
15 INJECTION, SOLUTION INTRAMUSCULAR; INTRAVENOUS ONCE
Status: COMPLETED | OUTPATIENT
Start: 2024-11-12 | End: 2024-11-12

## 2024-11-12 RX ADMIN — KETOROLAC TROMETHAMINE 15 MG: 15 INJECTION, SOLUTION INTRAMUSCULAR; INTRAVENOUS at 13:17

## 2024-11-12 ASSESSMENT — ENCOUNTER SYMPTOMS
VOMITING: 0
MYALGIAS: 0
EYE DISCHARGE: 0
NAUSEA: 0
WEIGHT LOSS: 0
EYE REDNESS: 0

## 2024-11-12 ASSESSMENT — FIBROSIS 4 INDEX: FIB4 SCORE: 0.52

## 2024-11-12 NOTE — PROGRESS NOTES
"Subjective     Sy Sanz is a 39 y.o. male who presents with Facial Pain (the patient is complaining about jaw pain, pressure around the eyes and it feel like a tickle going from his nose down his throat, hurts to swallow.)            Patient with PMH recurrent dental infections and sinus infection.  2 days left lower toothache.  Jaw swelling.  No trismus.  No fever.  Maxillary sinus pressure.  Postnasal drainage.  No fever.  Productive cough without blood in sputum no shortness of breath or wheezing.  No other aggravating alleviating factors.        Review of Systems   Constitutional:  Negative for malaise/fatigue and weight loss.   Eyes:  Negative for discharge and redness.   Gastrointestinal:  Negative for nausea and vomiting.   Musculoskeletal:  Negative for joint pain and myalgias.   Skin:  Negative for itching and rash.              Objective     /78   Pulse 95   Temp 35.9 °C (96.6 °F) (Temporal)   Resp 20   Ht 1.803 m (5' 11\")   Wt 83.6 kg (184 lb 4.8 oz)   SpO2 95%   BMI 25.70 kg/m²      Physical Exam  Constitutional:       Appearance: Normal appearance.   HENT:      Head: Normocephalic and atraumatic.      Right Ear: Tympanic membrane normal.      Left Ear: Tympanic membrane normal.      Nose: Congestion present.      Comments: Tender bilateral maxillary sinus.     Mouth/Throat:      Comments: Multiple caries.  Left lower gingival swelling without pointing abscess.  No trismus.  No submandibular swelling.  Cardiovascular:      Rate and Rhythm: Normal rate and regular rhythm.      Heart sounds: Normal heart sounds.   Pulmonary:      Effort: Pulmonary effort is normal.      Breath sounds: Normal breath sounds. No wheezing.   Skin:     General: Skin is warm and dry.      Findings: No rash.   Neurological:      Mental Status: He is alert.                             Assessment & Plan        Assessment & Plan  Dental infection    Orders:    amoxicillin-clavulanate (AUGMENTIN) " 875-125 MG Tab; Take 1 Tablet by mouth 2 times a day for 7 days.    ketorolac (Toradol) 15 MG/ML injection 15 mg    naproxen (NAPROSYN) 500 MG Tab; Take 1 Tablet by mouth 2 times a day with meals for 10 days.    Rhinosinusitis    Orders:    amoxicillin-clavulanate (AUGMENTIN) 875-125 MG Tab; Take 1 Tablet by mouth 2 times a day for 7 days.    Acute cough    Orders:    promethazine-dextromethorphan (PROMETHAZINE-DM) 6.25-15 MG/5ML syrup; Take 5 mL by mouth 4 times a day as needed for Cough.     Differential diagnosis, natural history, supportive care, and indications for immediate follow-up were discussed.

## 2024-11-12 NOTE — ASSESSMENT & PLAN NOTE
Orders:    amoxicillin-clavulanate (AUGMENTIN) 875-125 MG Tab; Take 1 Tablet by mouth 2 times a day for 7 days.    ketorolac (Toradol) 15 MG/ML injection 15 mg    naproxen (NAPROSYN) 500 MG Tab; Take 1 Tablet by mouth 2 times a day with meals for 10 days.

## (undated) DEVICE — DETERGENT RENUZYME PLUS 10 OZ PACKET (50/BX)

## (undated) DEVICE — KIT ROOM DECONTAMINATION

## (undated) DEVICE — TROCAR 5X100 NON BLADED Z-TH - READ KII (6/BX)

## (undated) DEVICE — GLOVE BIOGEL SZ 8 SURGICAL PF LTX - (50PR/BX 4BX/CA)

## (undated) DEVICE — BLADE SURGICAL CLIPPER - (50EA/CA)

## (undated) DEVICE — CANISTER SUCTION 3000ML MECHANICAL FILTER AUTO SHUTOFF MEDI-VAC NONSTERILE LF DISP  (40EA/CA)

## (undated) DEVICE — SODIUM CHL IRRIGATION 0.9% 1000ML (12EA/CA)

## (undated) DEVICE — ELECTRODE 850 FOAM ADHESIVE - HYDROGEL RADIOTRNSPRNT (50/PK)

## (undated) DEVICE — BAG RETRIEVAL 10ML (10EA/BX)

## (undated) DEVICE — GLOVE, BIOGEL ECLIPSE, SZ 7.0, PF LTX (50/BX)

## (undated) DEVICE — SET LEADWIRE 5 LEAD BEDSIDE DISPOSABLE ECG (1SET OF 5/EA)

## (undated) DEVICE — SUTURE GENERAL

## (undated) DEVICE — CANNULA W/SEAL 5X100 Z-THRE - ADED KII (12/BX)

## (undated) DEVICE — PROTECTOR ULNA NERVE - (36PR/CA)

## (undated) DEVICE — GLOVE BIOGEL INDICATOR SZ 8 SURGICAL PF LTX - (50/BX 4BX/CA)

## (undated) DEVICE — DERMABOND ADVANCED - (12EA/BX)

## (undated) DEVICE — STAPLER 45MM ARTICULATING - ENDO (3EA/BX)

## (undated) DEVICE — SUCTION INSTRUMENT YANKAUER BULBOUS TIP W/O VENT (50EA/CA)

## (undated) DEVICE — TROCAR Z THREAD 12 X 100 - BLADED (6/BX)

## (undated) DEVICE — NEPTUNE 4 PORT MANIFOLD - (20/PK)

## (undated) DEVICE — MASK ANESTHESIA ADULT  - (100/CA)

## (undated) DEVICE — DRAPE LARGE 3 QUARTER - (20/CA)

## (undated) DEVICE — GOWN SURGICAL XX-LARGE - (28EA/CA) SIRUS NON REINFORCED

## (undated) DEVICE — HEAD HOLDER JUNIOR/ADULT

## (undated) DEVICE — SENSOR SPO2 NEO LNCS ADHESIVE (20/BX) SEE USER NOTES

## (undated) DEVICE — LACTATED RINGERS INJ 1000 ML - (14EA/CA 60CA/PF)

## (undated) DEVICE — TUBING CLEARLINK DUO-VENT - C-FLO (48EA/CA)

## (undated) DEVICE — GLOVE BIOGEL PI INDICATOR SZ 7.0 SURGICAL PF LF - (50/BX 4BX/CA)

## (undated) DEVICE — STAPLE 45MM VASCULAR WHITE 2.5MM (12EA/BX)

## (undated) DEVICE — PACK LAP CHOLE OR - (2EA/CA)

## (undated) DEVICE — KIT ANESTHESIA W/CIRCUIT & 3/LT BAG W/FILTER (20EA/CA)

## (undated) DEVICE — TOWELS CLOTH SURGICAL - (4/PK 20PK/CA)

## (undated) DEVICE — CHLORAPREP 26 ML APPLICATOR - ORANGE TINT(25/CA)

## (undated) DEVICE — ELECTRODE DUAL RETURN W/ CORD - (50/PK)

## (undated) DEVICE — DRAPE SURG STERI-DRAPE 7X11OD - (40EA/CA)

## (undated) DEVICE — SET EXTENSION WITH 2 PORTS (48EA/CA) ***PART #2C8610 IS A SUBSTITUTE*****

## (undated) DEVICE — SLEEVE, VASO, THIGH, MED

## (undated) DEVICE — SEALER VESSEL HARMONIC ACE PLUS WITH ADVANCED HEMOSTASIS 36CM (1/EA)